# Patient Record
Sex: MALE | Race: BLACK OR AFRICAN AMERICAN | Employment: UNEMPLOYED | URBAN - METROPOLITAN AREA
[De-identification: names, ages, dates, MRNs, and addresses within clinical notes are randomized per-mention and may not be internally consistent; named-entity substitution may affect disease eponyms.]

---

## 2018-01-01 ENCOUNTER — APPOINTMENT (OUTPATIENT)
Dept: GENERAL RADIOLOGY | Age: 58
DRG: 720 | End: 2018-01-01
Attending: INTERNAL MEDICINE
Payer: MEDICAID

## 2018-01-01 ENCOUNTER — APPOINTMENT (OUTPATIENT)
Dept: CT IMAGING | Age: 58
DRG: 720 | End: 2018-01-01
Attending: EMERGENCY MEDICINE
Payer: MEDICAID

## 2018-01-01 ENCOUNTER — APPOINTMENT (OUTPATIENT)
Dept: ULTRASOUND IMAGING | Age: 58
DRG: 720 | End: 2018-01-01
Attending: NURSE PRACTITIONER
Payer: MEDICAID

## 2018-01-01 ENCOUNTER — APPOINTMENT (OUTPATIENT)
Dept: CT IMAGING | Age: 58
DRG: 720 | End: 2018-01-01
Attending: PSYCHIATRY & NEUROLOGY
Payer: MEDICAID

## 2018-01-01 ENCOUNTER — APPOINTMENT (OUTPATIENT)
Dept: GENERAL RADIOLOGY | Age: 58
DRG: 720 | End: 2018-01-01
Attending: EMERGENCY MEDICINE
Payer: MEDICAID

## 2018-01-01 ENCOUNTER — APPOINTMENT (OUTPATIENT)
Dept: GENERAL RADIOLOGY | Age: 58
DRG: 720 | End: 2018-01-01
Attending: ANESTHESIOLOGY
Payer: MEDICAID

## 2018-01-01 ENCOUNTER — APPOINTMENT (OUTPATIENT)
Dept: GENERAL RADIOLOGY | Age: 58
DRG: 720 | End: 2018-01-01
Attending: THORACIC SURGERY (CARDIOTHORACIC VASCULAR SURGERY)
Payer: MEDICAID

## 2018-01-01 ENCOUNTER — HOSPICE ADMISSION (OUTPATIENT)
Dept: HOSPICE | Facility: HOSPICE | Age: 58
End: 2018-01-01

## 2018-01-01 ENCOUNTER — APPOINTMENT (OUTPATIENT)
Dept: GENERAL RADIOLOGY | Age: 58
DRG: 720 | End: 2018-01-01
Attending: NURSE PRACTITIONER
Payer: MEDICAID

## 2018-01-01 ENCOUNTER — APPOINTMENT (OUTPATIENT)
Dept: ULTRASOUND IMAGING | Age: 58
DRG: 720 | End: 2018-01-01
Attending: INTERNAL MEDICINE
Payer: MEDICAID

## 2018-01-01 ENCOUNTER — APPOINTMENT (OUTPATIENT)
Dept: MRI IMAGING | Age: 58
DRG: 720 | End: 2018-01-01
Attending: NURSE PRACTITIONER
Payer: MEDICAID

## 2018-01-01 ENCOUNTER — HOSPITAL ENCOUNTER (INPATIENT)
Age: 58
LOS: 9 days | DRG: 720 | End: 2018-05-18
Attending: EMERGENCY MEDICINE | Admitting: FAMILY MEDICINE
Payer: MEDICAID

## 2018-01-01 VITALS
HEIGHT: 72 IN | BODY MASS INDEX: 22.34 KG/M2 | HEART RATE: 90 BPM | RESPIRATION RATE: 22 BRPM | WEIGHT: 164.9 LBS | OXYGEN SATURATION: 84 % | SYSTOLIC BLOOD PRESSURE: 107 MMHG | TEMPERATURE: 101.5 F | DIASTOLIC BLOOD PRESSURE: 60 MMHG

## 2018-01-01 DIAGNOSIS — R50.9 FEVER: ICD-10-CM

## 2018-01-01 DIAGNOSIS — R93.0 ABNORMAL HEAD CT: ICD-10-CM

## 2018-01-01 DIAGNOSIS — R06.82 RESPIRATORY RATE INCREASED: ICD-10-CM

## 2018-01-01 DIAGNOSIS — Z51.5 END OF LIFE CARE: ICD-10-CM

## 2018-01-01 DIAGNOSIS — J18.9 PNEUMONIA OF BOTH LUNGS DUE TO INFECTIOUS ORGANISM, UNSPECIFIED PART OF LUNG: ICD-10-CM

## 2018-01-01 DIAGNOSIS — G93.40 ENCEPHALOPATHY: ICD-10-CM

## 2018-01-01 DIAGNOSIS — R53.81 DEBILITY: ICD-10-CM

## 2018-01-01 DIAGNOSIS — R41.82 ALTERED MENTAL STATUS: ICD-10-CM

## 2018-01-01 DIAGNOSIS — F19.10 POLYSUBSTANCE ABUSE (HCC): ICD-10-CM

## 2018-01-01 DIAGNOSIS — N17.9 AKI (ACUTE KIDNEY INJURY) (HCC): ICD-10-CM

## 2018-01-01 DIAGNOSIS — R41.82 ALTERED MENTAL STATUS, UNSPECIFIED ALTERED MENTAL STATUS TYPE: ICD-10-CM

## 2018-01-01 DIAGNOSIS — Z71.89 GOALS OF CARE, COUNSELING/DISCUSSION: ICD-10-CM

## 2018-01-01 DIAGNOSIS — I38 ENDOCARDITIS, UNSPECIFIED CHRONICITY, UNSPECIFIED ENDOCARDITIS TYPE: ICD-10-CM

## 2018-01-01 DIAGNOSIS — R65.20 SEVERE SEPSIS (HCC): Primary | ICD-10-CM

## 2018-01-01 DIAGNOSIS — A41.9 SEVERE SEPSIS (HCC): Primary | ICD-10-CM

## 2018-01-01 LAB
ALBUMIN SERPL-MCNC: 1.4 G/DL (ref 3.5–5)
ALBUMIN SERPL-MCNC: 1.4 G/DL (ref 3.5–5)
ALBUMIN SERPL-MCNC: 1.5 G/DL (ref 3.5–5)
ALBUMIN SERPL-MCNC: 2.2 G/DL (ref 3.5–5)
ALBUMIN/GLOB SERPL: 0.3 {RATIO} (ref 1.1–2.2)
ALBUMIN/GLOB SERPL: 0.4 {RATIO} (ref 1.1–2.2)
ALP SERPL-CCNC: 111 U/L (ref 45–117)
ALP SERPL-CCNC: 59 U/L (ref 45–117)
ALP SERPL-CCNC: 61 U/L (ref 45–117)
ALP SERPL-CCNC: 65 U/L (ref 45–117)
ALP SERPL-CCNC: 68 U/L (ref 45–117)
ALP SERPL-CCNC: 73 U/L (ref 45–117)
ALP SERPL-CCNC: 79 U/L (ref 45–117)
ALT SERPL-CCNC: 41 U/L (ref 12–78)
ALT SERPL-CCNC: 53 U/L (ref 12–78)
ALT SERPL-CCNC: 53 U/L (ref 12–78)
ALT SERPL-CCNC: 59 U/L (ref 12–78)
ALT SERPL-CCNC: 62 U/L (ref 12–78)
ALT SERPL-CCNC: 80 U/L (ref 12–78)
ALT SERPL-CCNC: 87 U/L (ref 12–78)
AMMONIA PLAS-SCNC: 11 UMOL/L
AMPHET UR QL SCN: NEGATIVE
ANION GAP BLD CALC-SCNC: 19 MMOL/L (ref 10–20)
ANION GAP SERPL CALC-SCNC: 11 MMOL/L (ref 5–15)
ANION GAP SERPL CALC-SCNC: 19 MMOL/L (ref 5–15)
ANION GAP SERPL CALC-SCNC: 7 MMOL/L (ref 5–15)
ANION GAP SERPL CALC-SCNC: 7 MMOL/L (ref 5–15)
ANION GAP SERPL CALC-SCNC: 9 MMOL/L (ref 5–15)
ANNOTATION COMMENT IMP: NORMAL
APAP SERPL-MCNC: <2 UG/ML (ref 10–30)
APPEARANCE UR: ABNORMAL
APTT PPP: 30.7 SEC (ref 22.1–32)
APTT PPP: 31.6 SEC (ref 22.1–32)
APTT PPP: 33.3 SEC (ref 22.1–32)
ARTERIAL PATENCY WRIST A: NO
ARTERIAL PATENCY WRIST A: YES
AST SERPL-CCNC: 114 U/L (ref 15–37)
AST SERPL-CCNC: 137 U/L (ref 15–37)
AST SERPL-CCNC: 143 U/L (ref 15–37)
AST SERPL-CCNC: 149 U/L (ref 15–37)
AST SERPL-CCNC: 162 U/L (ref 15–37)
AST SERPL-CCNC: 176 U/L (ref 15–37)
AST SERPL-CCNC: 260 U/L (ref 15–37)
ATRIAL RATE: 108 BPM
ATRIAL RATE: 110 BPM
ATRIAL RATE: 113 BPM
ATRIAL RATE: 220 BPM
ATRIAL RATE: 66 BPM
ATRIAL RATE: 68 BPM
ATRIAL RATE: 71 BPM
ATRIAL RATE: 72 BPM
ATRIAL RATE: 73 BPM
ATRIAL RATE: 98 BPM
BACTERIA SPEC CULT: ABNORMAL
BACTERIA URNS QL MICRO: NEGATIVE /HPF
BARBITURATES UR QL SCN: NEGATIVE
BASE DEFICIT BLD-SCNC: 3 MMOL/L
BASE DEFICIT BLD-SCNC: 4 MMOL/L
BASE DEFICIT BLD-SCNC: 6 MMOL/L
BASOPHILS # BLD: 0 K/UL (ref 0–0.1)
BASOPHILS NFR BLD: 0 % (ref 0–1)
BDY SITE: ABNORMAL
BENZODIAZ UR QL: NEGATIVE
BILIRUB SERPL-MCNC: 2.8 MG/DL (ref 0.2–1)
BILIRUB SERPL-MCNC: 3.4 MG/DL (ref 0.2–1)
BILIRUB SERPL-MCNC: 3.7 MG/DL (ref 0.2–1)
BILIRUB SERPL-MCNC: 4 MG/DL (ref 0.2–1)
BILIRUB SERPL-MCNC: 4 MG/DL (ref 0.2–1)
BILIRUB SERPL-MCNC: 4.1 MG/DL (ref 0.2–1)
BILIRUB SERPL-MCNC: 5.6 MG/DL (ref 0.2–1)
BILIRUB UR QL CFM: POSITIVE
BNP SERPL-MCNC: ABNORMAL PG/ML (ref 0–125)
BUN BLD-MCNC: 46 MG/DL (ref 9–20)
BUN SERPL-MCNC: 56 MG/DL (ref 6–20)
BUN SERPL-MCNC: 56 MG/DL (ref 6–20)
BUN SERPL-MCNC: 58 MG/DL (ref 6–20)
BUN SERPL-MCNC: 60 MG/DL (ref 6–20)
BUN SERPL-MCNC: 63 MG/DL (ref 6–20)
BUN SERPL-MCNC: 65 MG/DL (ref 6–20)
BUN SERPL-MCNC: 70 MG/DL (ref 6–20)
BUN SERPL-MCNC: 76 MG/DL (ref 6–20)
BUN/CREAT SERPL: 20 (ref 12–20)
BUN/CREAT SERPL: 34 (ref 12–20)
BUN/CREAT SERPL: 34 (ref 12–20)
BUN/CREAT SERPL: 36 (ref 12–20)
BUN/CREAT SERPL: 37 (ref 12–20)
BUN/CREAT SERPL: 41 (ref 12–20)
BUN/CREAT SERPL: 42 (ref 12–20)
BUN/CREAT SERPL: 43 (ref 12–20)
C-ANCA TITR SER IF: ABNORMAL TITER
CA-I BLD-MCNC: 0.88 MMOL/L (ref 1.12–1.32)
CALCIUM SERPL-MCNC: 6.9 MG/DL (ref 8.5–10.1)
CALCIUM SERPL-MCNC: 7 MG/DL (ref 8.5–10.1)
CALCIUM SERPL-MCNC: 7.1 MG/DL (ref 8.5–10.1)
CALCIUM SERPL-MCNC: 7.4 MG/DL (ref 8.5–10.1)
CALCIUM SERPL-MCNC: 7.7 MG/DL (ref 8.5–10.1)
CALCIUM SERPL-MCNC: 8.4 MG/DL (ref 8.5–10.1)
CALCULATED P AXIS, ECG09: -7 DEGREES
CALCULATED P AXIS, ECG09: 58 DEGREES
CALCULATED P AXIS, ECG09: 58 DEGREES
CALCULATED P AXIS, ECG09: 62 DEGREES
CALCULATED P AXIS, ECG09: 65 DEGREES
CALCULATED R AXIS, ECG10: 20 DEGREES
CALCULATED R AXIS, ECG10: 42 DEGREES
CALCULATED R AXIS, ECG10: 43 DEGREES
CALCULATED R AXIS, ECG10: 45 DEGREES
CALCULATED R AXIS, ECG10: 56 DEGREES
CALCULATED R AXIS, ECG10: 68 DEGREES
CALCULATED R AXIS, ECG10: 69 DEGREES
CALCULATED R AXIS, ECG10: 74 DEGREES
CALCULATED R AXIS, ECG10: 75 DEGREES
CALCULATED R AXIS, ECG10: 76 DEGREES
CALCULATED T AXIS, ECG11: -3 DEGREES
CALCULATED T AXIS, ECG11: -4 DEGREES
CALCULATED T AXIS, ECG11: 1 DEGREES
CALCULATED T AXIS, ECG11: 10 DEGREES
CALCULATED T AXIS, ECG11: 15 DEGREES
CALCULATED T AXIS, ECG11: 2 DEGREES
CALCULATED T AXIS, ECG11: 24 DEGREES
CALCULATED T AXIS, ECG11: 31 DEGREES
CALCULATED T AXIS, ECG11: 55 DEGREES
CALCULATED T AXIS, ECG11: 9 DEGREES
CANNABINOIDS UR QL SCN: NEGATIVE
CHLORIDE BLD-SCNC: 101 MMOL/L (ref 98–107)
CHLORIDE SERPL-SCNC: 117 MMOL/L (ref 97–108)
CHLORIDE SERPL-SCNC: 118 MMOL/L (ref 97–108)
CHLORIDE SERPL-SCNC: 120 MMOL/L (ref 97–108)
CHLORIDE SERPL-SCNC: 120 MMOL/L (ref 97–108)
CHLORIDE SERPL-SCNC: 121 MMOL/L (ref 97–108)
CHLORIDE SERPL-SCNC: 94 MMOL/L (ref 97–108)
CHOLEST SERPL-MCNC: <50 MG/DL
CK MB CFR SERPL CALC: 10.4 % (ref 0–2.5)
CK MB SERPL-MCNC: 31.6 NG/ML (ref 5–25)
CK SERPL-CCNC: 116 U/L (ref 39–308)
CK SERPL-CCNC: 303 U/L (ref 39–308)
CO2 BLD-SCNC: 20 MMOL/L (ref 21–32)
CO2 SERPL-SCNC: 19 MMOL/L (ref 21–32)
CO2 SERPL-SCNC: 21 MMOL/L (ref 21–32)
CO2 SERPL-SCNC: 21 MMOL/L (ref 21–32)
CO2 SERPL-SCNC: 23 MMOL/L (ref 21–32)
CO2 SERPL-SCNC: 24 MMOL/L (ref 21–32)
CO2 SERPL-SCNC: 26 MMOL/L (ref 21–32)
COCAINE UR QL SCN: POSITIVE
COLOR UR: ABNORMAL
CREAT BLD-MCNC: 2.6 MG/DL (ref 0.6–1.3)
CREAT SERPL-MCNC: 1.57 MG/DL (ref 0.7–1.3)
CREAT SERPL-MCNC: 1.63 MG/DL (ref 0.7–1.3)
CREAT SERPL-MCNC: 1.65 MG/DL (ref 0.7–1.3)
CREAT SERPL-MCNC: 1.65 MG/DL (ref 0.7–1.3)
CREAT SERPL-MCNC: 1.7 MG/DL (ref 0.7–1.3)
CREAT SERPL-MCNC: 1.73 MG/DL (ref 0.7–1.3)
CREAT SERPL-MCNC: 1.77 MG/DL (ref 0.7–1.3)
CREAT SERPL-MCNC: 2.78 MG/DL (ref 0.7–1.3)
CRP SERPL-MCNC: 10.5 MG/DL (ref 0–0.6)
D DIMER PPP FEU-MCNC: 14.67 MG/L FEU (ref 0–0.65)
DATE LAST DOSE: ABNORMAL
DATE LAST DOSE: ABNORMAL
DIAGNOSIS, 93000: NORMAL
DIFFERENTIAL METHOD BLD: ABNORMAL
DRUG SCRN COMMENT,DRGCM: ABNORMAL
EOSINOPHIL # BLD: 0 K/UL (ref 0–0.4)
EOSINOPHIL # BLD: 0.6 K/UL (ref 0–0.4)
EOSINOPHIL NFR BLD: 0 % (ref 0–7)
EOSINOPHIL NFR BLD: 2 % (ref 0–7)
EPITH CASTS URNS QL MICRO: ABNORMAL /LPF
ERYTHROCYTE [DISTWIDTH] IN BLOOD BY AUTOMATED COUNT: 15.4 % (ref 11.5–14.5)
ERYTHROCYTE [DISTWIDTH] IN BLOOD BY AUTOMATED COUNT: 15.9 % (ref 11.5–14.5)
ERYTHROCYTE [DISTWIDTH] IN BLOOD BY AUTOMATED COUNT: 15.9 % (ref 11.5–14.5)
ERYTHROCYTE [DISTWIDTH] IN BLOOD BY AUTOMATED COUNT: 16.3 % (ref 11.5–14.5)
ERYTHROCYTE [DISTWIDTH] IN BLOOD BY AUTOMATED COUNT: 16.3 % (ref 11.5–14.5)
ERYTHROCYTE [DISTWIDTH] IN BLOOD BY AUTOMATED COUNT: 16.6 % (ref 11.5–14.5)
ERYTHROCYTE [DISTWIDTH] IN BLOOD BY AUTOMATED COUNT: 17 % (ref 11.5–14.5)
ERYTHROCYTE [SEDIMENTATION RATE] IN BLOOD: 3 MM/HR (ref 0–20)
ETHANOL SERPL-MCNC: <10 MG/DL
FIBRINOGEN PPP-MCNC: 232 MG/DL (ref 200–475)
FLUAV AG NPH QL IA: NEGATIVE
FLUBV AG NOSE QL IA: NEGATIVE
FSP PPP LA-ACNC: 40 UG/ML
GAS FLOW.O2 O2 DELIVERY SYS: ABNORMAL L/MIN
GAS FLOW.O2 SETTING OXYMISER: 14 BPM
GAS FLOW.O2 SETTING OXYMISER: 2 L/M
GAS FLOW.O2 SETTING OXYMISER: 2 L/M
GAS FLOW.O2 SETTING OXYMISER: 20 BPM
GLOBULIN SER CALC-MCNC: 4.5 G/DL (ref 2–4)
GLOBULIN SER CALC-MCNC: 4.5 G/DL (ref 2–4)
GLOBULIN SER CALC-MCNC: 4.7 G/DL (ref 2–4)
GLOBULIN SER CALC-MCNC: 4.7 G/DL (ref 2–4)
GLOBULIN SER CALC-MCNC: 4.9 G/DL (ref 2–4)
GLOBULIN SER CALC-MCNC: 4.9 G/DL (ref 2–4)
GLOBULIN SER CALC-MCNC: 6.2 G/DL (ref 2–4)
GLUCOSE BLD STRIP.AUTO-MCNC: 112 MG/DL (ref 65–100)
GLUCOSE BLD STRIP.AUTO-MCNC: 157 MG/DL (ref 65–100)
GLUCOSE BLD STRIP.AUTO-MCNC: 166 MG/DL (ref 65–100)
GLUCOSE BLD STRIP.AUTO-MCNC: 96 MG/DL (ref 65–100)
GLUCOSE BLD-MCNC: 110 MG/DL (ref 65–100)
GLUCOSE SERPL-MCNC: 104 MG/DL (ref 65–100)
GLUCOSE SERPL-MCNC: 144 MG/DL (ref 65–100)
GLUCOSE SERPL-MCNC: 148 MG/DL (ref 65–100)
GLUCOSE SERPL-MCNC: 161 MG/DL (ref 65–100)
GLUCOSE SERPL-MCNC: 164 MG/DL (ref 65–100)
GLUCOSE SERPL-MCNC: 177 MG/DL (ref 65–100)
GLUCOSE SERPL-MCNC: 180 MG/DL (ref 65–100)
GLUCOSE SERPL-MCNC: 184 MG/DL (ref 65–100)
GLUCOSE UR STRIP.AUTO-MCNC: NEGATIVE MG/DL
GRAM STN SPEC: ABNORMAL
GRAM STN SPEC: ABNORMAL
HBV CORE AB SERPL QL IA: NEGATIVE
HBV CORE IGM SER QL: NONREACTIVE
HBV SURFACE AG SER QL: <0.1 INDEX
HBV SURFACE AG SER QL: NEGATIVE
HCO3 BLD-SCNC: 20.1 MMOL/L (ref 22–26)
HCO3 BLD-SCNC: 20.1 MMOL/L (ref 22–26)
HCO3 BLD-SCNC: 20.4 MMOL/L (ref 22–26)
HCO3 BLD-SCNC: 20.8 MMOL/L (ref 22–26)
HCO3 BLD-SCNC: 21.7 MMOL/L (ref 22–26)
HCT VFR BLD AUTO: 36 % (ref 36.6–50.3)
HCT VFR BLD AUTO: 36 % (ref 36.6–50.3)
HCT VFR BLD AUTO: 36.3 % (ref 36.6–50.3)
HCT VFR BLD AUTO: 36.9 % (ref 36.6–50.3)
HCT VFR BLD AUTO: 37 % (ref 36.6–50.3)
HCT VFR BLD AUTO: 37.5 % (ref 36.6–50.3)
HCT VFR BLD AUTO: 39.7 % (ref 36.6–50.3)
HCT VFR BLD AUTO: 46.1 % (ref 36.6–50.3)
HCT VFR BLD AUTO: 46.1 % (ref 36.6–50.3)
HCT VFR BLD CALC: 35 % (ref 36.6–50.3)
HDLC SERPL-MCNC: 9 MG/DL
HDLC SERPL: ABNORMAL {RATIO} (ref 0–5)
HEMOCCULT STL QL: NEGATIVE
HGB BLD-MCNC: 11.5 G/DL (ref 12.1–17)
HGB BLD-MCNC: 11.6 G/DL (ref 12.1–17)
HGB BLD-MCNC: 11.8 G/DL (ref 12.1–17)
HGB BLD-MCNC: 11.9 G/DL (ref 12.1–17)
HGB BLD-MCNC: 12.1 G/DL (ref 12.1–17)
HGB BLD-MCNC: 12.4 G/DL (ref 12.1–17)
HGB BLD-MCNC: 12.8 G/DL (ref 12.1–17)
HGB BLD-MCNC: 15.4 G/DL (ref 12.1–17)
HGB BLD-MCNC: 15.4 G/DL (ref 12.1–17)
HGB UR QL STRIP: ABNORMAL
HIV 1+2 AB+HIV1 P24 AG SERPL QL IA: NONREACTIVE
HIV12 RESULT COMMENT, HHIVC: NORMAL
IMM GRANULOCYTES # BLD: 0 K/UL
IMM GRANULOCYTES NFR BLD AUTO: 0 %
INR PPP: 1.3 (ref 0.9–1.1)
INR PPP: 1.4 (ref 0.9–1.1)
INR PPP: 1.5 (ref 0.9–1.1)
INR PPP: 1.5 (ref 0.9–1.1)
INR PPP: 1.8 (ref 0.9–1.1)
INR PPP: 1.9 (ref 0.9–1.1)
KETONES UR QL STRIP.AUTO: NEGATIVE MG/DL
LACTATE BLD-SCNC: 6.8 MMOL/L (ref 0.4–2)
LACTATE BLD-SCNC: 6.8 MMOL/L (ref 0.4–2)
LACTATE SERPL-SCNC: 1.6 MMOL/L (ref 0.4–2)
LACTATE SERPL-SCNC: 2.1 MMOL/L (ref 0.4–2)
LACTATE SERPL-SCNC: 2.7 MMOL/L (ref 0.4–2)
LACTATE SERPL-SCNC: 5.3 MMOL/L (ref 0.4–2)
LDLC SERPL CALC-MCNC: ABNORMAL MG/DL (ref 0–100)
LEUKOCYTE ESTERASE UR QL STRIP.AUTO: ABNORMAL
LIPASE SERPL-CCNC: 138 U/L (ref 73–393)
LIPID PROFILE,FLP: ABNORMAL
LYMPHOCYTES # BLD: 0.6 K/UL (ref 0.8–3.5)
LYMPHOCYTES # BLD: 0.8 K/UL (ref 0.8–3.5)
LYMPHOCYTES # BLD: 0.9 K/UL (ref 0.8–3.5)
LYMPHOCYTES # BLD: 1.1 K/UL (ref 0.8–3.5)
LYMPHOCYTES # BLD: 1.1 K/UL (ref 0.8–3.5)
LYMPHOCYTES # BLD: 1.3 K/UL (ref 0.8–3.5)
LYMPHOCYTES # BLD: 1.4 K/UL (ref 0.8–3.5)
LYMPHOCYTES # BLD: 2.3 K/UL (ref 0.8–3.5)
LYMPHOCYTES NFR BLD: 2 % (ref 12–49)
LYMPHOCYTES NFR BLD: 3 % (ref 12–49)
LYMPHOCYTES NFR BLD: 4 % (ref 12–49)
LYMPHOCYTES NFR BLD: 5 % (ref 12–49)
LYMPHOCYTES NFR BLD: 9 % (ref 12–49)
M TB IFN-G CD4+ BCKGRND COR BLD-ACNC: 0.01 IU/ML
M TB IFN-G CD4+ T-CELLS BLD-ACNC: 0.05 IU/ML
M TB TUBERC IFN-G BLD QL: NORMAL
M TB TUBERC IGNF/MITOGEN IGNF CONTROL: 0.03 IU/ML
MAGNESIUM SERPL-MCNC: 2.1 MG/DL (ref 1.6–2.4)
MAGNESIUM SERPL-MCNC: 2.2 MG/DL (ref 1.6–2.4)
MAGNESIUM SERPL-MCNC: 2.7 MG/DL (ref 1.6–2.4)
MAGNESIUM SERPL-MCNC: 3.3 MG/DL (ref 1.6–2.4)
MAGNESIUM SERPL-MCNC: 3.4 MG/DL (ref 1.6–2.4)
MAGNESIUM SERPL-MCNC: 3.4 MG/DL (ref 1.6–2.4)
MAGNESIUM SERPL-MCNC: 3.6 MG/DL (ref 1.6–2.4)
MCH RBC QN AUTO: 27 PG (ref 26–34)
MCH RBC QN AUTO: 27.2 PG (ref 26–34)
MCH RBC QN AUTO: 27.3 PG (ref 26–34)
MCH RBC QN AUTO: 27.5 PG (ref 26–34)
MCHC RBC AUTO-ENTMCNC: 31.9 G/DL (ref 30–36.5)
MCHC RBC AUTO-ENTMCNC: 32 G/DL (ref 30–36.5)
MCHC RBC AUTO-ENTMCNC: 32.2 G/DL (ref 30–36.5)
MCHC RBC AUTO-ENTMCNC: 32.2 G/DL (ref 30–36.5)
MCHC RBC AUTO-ENTMCNC: 32.8 G/DL (ref 30–36.5)
MCHC RBC AUTO-ENTMCNC: 32.8 G/DL (ref 30–36.5)
MCHC RBC AUTO-ENTMCNC: 33.1 G/DL (ref 30–36.5)
MCHC RBC AUTO-ENTMCNC: 33.4 G/DL (ref 30–36.5)
MCHC RBC AUTO-ENTMCNC: 33.4 G/DL (ref 30–36.5)
MCV RBC AUTO: 81.6 FL (ref 80–99)
MCV RBC AUTO: 81.6 FL (ref 80–99)
MCV RBC AUTO: 82.4 FL (ref 80–99)
MCV RBC AUTO: 82.4 FL (ref 80–99)
MCV RBC AUTO: 83.3 FL (ref 80–99)
MCV RBC AUTO: 84.5 FL (ref 80–99)
MCV RBC AUTO: 84.9 FL (ref 80–99)
MCV RBC AUTO: 85.3 FL (ref 80–99)
MCV RBC AUTO: 86 FL (ref 80–99)
METAMYELOCYTES NFR BLD MANUAL: 1 %
METHADONE UR QL: NEGATIVE
MONOCYTES # BLD: 0.3 K/UL (ref 0–1)
MONOCYTES # BLD: 0.3 K/UL (ref 0–1)
MONOCYTES # BLD: 0.5 K/UL (ref 0–1)
MONOCYTES # BLD: 0.6 K/UL (ref 0–1)
MONOCYTES # BLD: 0.6 K/UL (ref 0–1)
MONOCYTES # BLD: 0.7 K/UL (ref 0–1)
MONOCYTES # BLD: 0.7 K/UL (ref 0–1)
MONOCYTES # BLD: 1.3 K/UL (ref 0–1)
MONOCYTES NFR BLD: 1 % (ref 5–13)
MONOCYTES NFR BLD: 1 % (ref 5–13)
MONOCYTES NFR BLD: 2 % (ref 5–13)
MONOCYTES NFR BLD: 4 % (ref 5–13)
MYELOCYTES NFR BLD MANUAL: 1 %
MYELOCYTES NFR BLD MANUAL: 2 %
MYELOCYTES NFR BLD MANUAL: 3 %
MYELOCYTES NFR BLD MANUAL: 3 %
MYELOPEROXIDASE AB SER IA-ACNC: <9 U/ML (ref 0–9)
NEUTS BAND NFR BLD MANUAL: 3 % (ref 0–6)
NEUTS BAND NFR BLD MANUAL: 5 % (ref 0–6)
NEUTS BAND NFR BLD MANUAL: 5 % (ref 0–6)
NEUTS SEG # BLD: 21.6 K/UL (ref 1.8–8)
NEUTS SEG # BLD: 23.9 K/UL (ref 1.8–8)
NEUTS SEG # BLD: 26.1 K/UL (ref 1.8–8)
NEUTS SEG # BLD: 26.5 K/UL (ref 1.8–8)
NEUTS SEG # BLD: 27 K/UL (ref 1.8–8)
NEUTS SEG # BLD: 28.8 K/UL (ref 1.8–8)
NEUTS SEG # BLD: 32.4 K/UL (ref 1.8–8)
NEUTS SEG # BLD: 32.4 K/UL (ref 1.8–8)
NEUTS SEG NFR BLD: 86 % (ref 32–75)
NEUTS SEG NFR BLD: 87 % (ref 32–75)
NEUTS SEG NFR BLD: 90 % (ref 32–75)
NEUTS SEG NFR BLD: 90 % (ref 32–75)
NEUTS SEG NFR BLD: 91 % (ref 32–75)
NEUTS SEG NFR BLD: 93 % (ref 32–75)
NITRITE UR QL STRIP.AUTO: NEGATIVE
NRBC # BLD: 0 K/UL (ref 0–0.01)
NRBC # BLD: 0 K/UL (ref 0–0.01)
NRBC # BLD: 0.02 K/UL (ref 0–0.01)
NRBC # BLD: 0.03 K/UL (ref 0–0.01)
NRBC # BLD: 0.05 K/UL (ref 0–0.01)
NRBC # BLD: 0.06 K/UL (ref 0–0.01)
NRBC # BLD: 0.06 K/UL (ref 0–0.01)
NRBC BLD-RTO: 0 PER 100 WBC
NRBC BLD-RTO: 0 PER 100 WBC
NRBC BLD-RTO: 0.1 PER 100 WBC
NRBC BLD-RTO: 0.2 PER 100 WBC
O+P SPEC MICRO: NORMAL
O+P STL CONC: NORMAL
O2/TOTAL GAS SETTING VFR VENT: 100 %
O2/TOTAL GAS SETTING VFR VENT: 60 %
OPIATES UR QL: POSITIVE
OSMOLALITY UR: 447 MOSM/KG H2O
OTHER,OTHU: ABNORMAL
P-ANCA ATYPICAL TITR SER IF: ABNORMAL TITER
P-ANCA TITR SER IF: ABNORMAL TITER
P-R INTERVAL, ECG05: 130 MS
P-R INTERVAL, ECG05: 130 MS
P-R INTERVAL, ECG05: 160 MS
P-R INTERVAL, ECG05: 166 MS
PATH REV BLD -IMP: ABNORMAL
PCO2 BLD: 24.2 MMHG (ref 35–45)
PCO2 BLD: 25.4 MMHG (ref 35–45)
PCO2 BLD: 28.4 MMHG (ref 35–45)
PCO2 BLD: 29.3 MMHG (ref 35–45)
PCO2 BLD: 56.2 MMHG (ref 35–45)
PCP UR QL: NEGATIVE
PEEP RESPIRATORY: 5 CMH2O
PEEP RESPIRATORY: 8 CMH2O
PH BLD: 7.2 [PH] (ref 7.35–7.45)
PH BLD: 7.46 [PH] (ref 7.35–7.45)
PH BLD: 7.46 [PH] (ref 7.35–7.45)
PH BLD: 7.51 [PH] (ref 7.35–7.45)
PH BLD: 7.53 [PH] (ref 7.35–7.45)
PH UR STRIP: 5.5 [PH] (ref 5–8)
PHOSPHATE SERPL-MCNC: 2.8 MG/DL (ref 2.6–4.7)
PHOSPHATE SERPL-MCNC: 3.1 MG/DL (ref 2.6–4.7)
PHOSPHATE SERPL-MCNC: 3.7 MG/DL (ref 2.6–4.7)
PHOSPHATE SERPL-MCNC: 3.9 MG/DL (ref 2.6–4.7)
PHOSPHATE SERPL-MCNC: 3.9 MG/DL (ref 2.6–4.7)
PHOSPHATE SERPL-MCNC: 4.6 MG/DL (ref 2.6–4.7)
PLATELET # BLD AUTO: 26 K/UL (ref 150–400)
PLATELET # BLD AUTO: 28 K/UL (ref 150–400)
PLATELET # BLD AUTO: 31 K/UL (ref 150–400)
PLATELET # BLD AUTO: 32 K/UL (ref 150–400)
PLATELET # BLD AUTO: 32 K/UL (ref 150–400)
PLATELET # BLD AUTO: 43 K/UL (ref 150–400)
PLATELET # BLD AUTO: 54 K/UL (ref 150–400)
PLATELET # BLD AUTO: 84 K/UL (ref 150–400)
PLATELET # BLD AUTO: 84 K/UL (ref 150–400)
PLATELET COMMENTS,PCOM: ABNORMAL
PMV BLD AUTO: 12 FL (ref 8.9–12.9)
PMV BLD AUTO: 12 FL (ref 8.9–12.9)
PMV BLD AUTO: 12.2 FL (ref 8.9–12.9)
PMV BLD AUTO: 12.9 FL (ref 8.9–12.9)
PMV BLD AUTO: ABNORMAL FL (ref 8.9–12.9)
PO2 BLD: 100 MMHG (ref 80–100)
PO2 BLD: 248 MMHG (ref 80–100)
PO2 BLD: 58 MMHG (ref 80–100)
PO2 BLD: 71 MMHG (ref 80–100)
PO2 BLD: 80 MMHG (ref 80–100)
POTASSIUM BLD-SCNC: 3.5 MMOL/L (ref 3.5–5.1)
POTASSIUM SERPL-SCNC: 3.6 MMOL/L (ref 3.5–5.1)
POTASSIUM SERPL-SCNC: 4 MMOL/L (ref 3.5–5.1)
POTASSIUM SERPL-SCNC: 4 MMOL/L (ref 3.5–5.1)
POTASSIUM SERPL-SCNC: 4.1 MMOL/L (ref 3.5–5.1)
POTASSIUM SERPL-SCNC: 4.2 MMOL/L (ref 3.5–5.1)
POTASSIUM SERPL-SCNC: 4.3 MMOL/L (ref 3.5–5.1)
POTASSIUM SERPL-SCNC: 4.4 MMOL/L (ref 3.5–5.1)
POTASSIUM SERPL-SCNC: 4.4 MMOL/L (ref 3.5–5.1)
PROT SERPL-MCNC: 5.9 G/DL (ref 6.4–8.2)
PROT SERPL-MCNC: 6 G/DL (ref 6.4–8.2)
PROT SERPL-MCNC: 6.2 G/DL (ref 6.4–8.2)
PROT SERPL-MCNC: 6.2 G/DL (ref 6.4–8.2)
PROT SERPL-MCNC: 6.3 G/DL (ref 6.4–8.2)
PROT SERPL-MCNC: 6.4 G/DL (ref 6.4–8.2)
PROT SERPL-MCNC: 8.4 G/DL (ref 6.4–8.2)
PROT UR STRIP-MCNC: 30 MG/DL
PROTEINASE3 AB SER IA-ACNC: <3.5 U/ML (ref 0–3.5)
PROTHROMBIN TIME: 13.5 SEC (ref 9–11.1)
PROTHROMBIN TIME: 14.5 SEC (ref 9–11.1)
PROTHROMBIN TIME: 15.4 SEC (ref 9–11.1)
PROTHROMBIN TIME: 15.7 SEC (ref 9–11.1)
PROTHROMBIN TIME: 17.7 SEC (ref 9–11.1)
PROTHROMBIN TIME: 17.9 SEC (ref 9–11.1)
PROTHROMBIN TIME: 18.3 SEC (ref 9–11.1)
PROTHROMBIN TIME: 18.8 SEC (ref 9–11.1)
Q-T INTERVAL, ECG07: 314 MS
Q-T INTERVAL, ECG07: 336 MS
Q-T INTERVAL, ECG07: 360 MS
Q-T INTERVAL, ECG07: 364 MS
Q-T INTERVAL, ECG07: 370 MS
Q-T INTERVAL, ECG07: 418 MS
Q-T INTERVAL, ECG07: 446 MS
Q-T INTERVAL, ECG07: 450 MS
Q-T INTERVAL, ECG07: 454 MS
Q-T INTERVAL, ECG07: 502 MS
QRS DURATION, ECG06: 100 MS
QRS DURATION, ECG06: 102 MS
QRS DURATION, ECG06: 110 MS
QRS DURATION, ECG06: 114 MS
QRS DURATION, ECG06: 114 MS
QRS DURATION, ECG06: 116 MS
QRS DURATION, ECG06: 120 MS
QRS DURATION, ECG06: 182 MS
QRS DURATION, ECG06: 88 MS
QRS DURATION, ECG06: 98 MS
QTC CALCULATION (BEZET), ECG08: 424 MS
QTC CALCULATION (BEZET), ECG08: 426 MS
QTC CALCULATION (BEZET), ECG08: 457 MS
QTC CALCULATION (BEZET), ECG08: 482 MS
QTC CALCULATION (BEZET), ECG08: 484 MS
QTC CALCULATION (BEZET), ECG08: 495 MS
QTC CALCULATION (BEZET), ECG08: 495 MS
QTC CALCULATION (BEZET), ECG08: 496 MS
QTC CALCULATION (BEZET), ECG08: 509 MS
QTC CALCULATION (BEZET), ECG08: 553 MS
QUANTIFERON NIL VALUE: 0.04 IU/ML
RBC # BLD AUTO: 4.22 M/UL (ref 4.1–5.7)
RBC # BLD AUTO: 4.22 M/UL (ref 4.1–5.7)
RBC # BLD AUTO: 4.36 M/UL (ref 4.1–5.7)
RBC # BLD AUTO: 4.37 M/UL (ref 4.1–5.7)
RBC # BLD AUTO: 4.43 M/UL (ref 4.1–5.7)
RBC # BLD AUTO: 4.55 M/UL (ref 4.1–5.7)
RBC # BLD AUTO: 4.7 M/UL (ref 4.1–5.7)
RBC # BLD AUTO: 5.65 M/UL (ref 4.1–5.7)
RBC # BLD AUTO: 5.65 M/UL (ref 4.1–5.7)
RBC #/AREA URNS HPF: ABNORMAL /HPF (ref 0–5)
RBC MORPH BLD: ABNORMAL
REPORTED DOSE,DOSE: ABNORMAL UNITS
REPORTED DOSE,DOSE: ABNORMAL UNITS
REPORTED DOSE/TIME,TMG: ABNORMAL
REPORTED DOSE/TIME,TMG: ABNORMAL
SALICYLATES SERPL-MCNC: <1.7 MG/DL (ref 2.8–20)
SAO2 % BLD: 100 % (ref 92–97)
SAO2 % BLD: 92 % (ref 92–97)
SAO2 % BLD: 96 % (ref 92–97)
SAO2 % BLD: 96 % (ref 92–97)
SAO2 % BLD: 97 % (ref 92–97)
SERVICE CMNT-IMP: ABNORMAL
SERVICE CMNT-IMP: NORMAL
SERVICE CMNT-IMP: NORMAL
SODIUM BLD-SCNC: 136 MMOL/L (ref 136–145)
SODIUM SERPL-SCNC: 134 MMOL/L (ref 136–145)
SODIUM SERPL-SCNC: 147 MMOL/L (ref 136–145)
SODIUM SERPL-SCNC: 148 MMOL/L (ref 136–145)
SODIUM SERPL-SCNC: 149 MMOL/L (ref 136–145)
SODIUM SERPL-SCNC: 150 MMOL/L (ref 136–145)
SODIUM SERPL-SCNC: 150 MMOL/L (ref 136–145)
SODIUM SERPL-SCNC: 151 MMOL/L (ref 136–145)
SODIUM SERPL-SCNC: 155 MMOL/L (ref 136–145)
SODIUM UR-SCNC: 12 MMOL/L
SP GR UR REFRACTOMETRY: 1.02 (ref 1–1.03)
SPECIMEN SOURCE: NORMAL
SPECIMEN TYPE: ABNORMAL
THERAPEUTIC RANGE,PTTT: ABNORMAL SECS (ref 58–77)
THERAPEUTIC RANGE,PTTT: NORMAL SECS (ref 58–77)
THERAPEUTIC RANGE,PTTT: NORMAL SECS (ref 58–77)
TOTAL RESP. RATE, ITRR: 14
TOTAL RESP. RATE, ITRR: 26
TOTAL RESP. RATE, ITRR: 40
TRIGL SERPL-MCNC: 173 MG/DL (ref ?–150)
TROPONIN I SERPL-MCNC: 19.3 NG/ML
TSH SERPL DL<=0.05 MIU/L-ACNC: 0.16 UIU/ML (ref 0.36–3.74)
TSH SERPL DL<=0.05 MIU/L-ACNC: 0.61 UIU/ML (ref 0.36–3.74)
UROBILINOGEN UR QL STRIP.AUTO: 1 EU/DL (ref 0.2–1)
VANCOMYCIN SERPL-MCNC: 7.9 UG/ML
VANCOMYCIN TROUGH SERPL-MCNC: 14.7 UG/ML (ref 5–10)
VANCOMYCIN TROUGH SERPL-MCNC: 20.7 UG/ML (ref 5–10)
VENTILATION MODE VENT: ABNORMAL
VENTILATION MODE VENT: ABNORMAL
VENTRICULAR RATE, ECG03: 110 BPM
VENTRICULAR RATE, ECG03: 111 BPM
VENTRICULAR RATE, ECG03: 114 BPM
VENTRICULAR RATE, ECG03: 114 BPM
VENTRICULAR RATE, ECG03: 68 BPM
VENTRICULAR RATE, ECG03: 71 BPM
VENTRICULAR RATE, ECG03: 72 BPM
VENTRICULAR RATE, ECG03: 73 BPM
VENTRICULAR RATE, ECG03: 73 BPM
VENTRICULAR RATE, ECG03: 97 BPM
VLDLC SERPL CALC-MCNC: ABNORMAL MG/DL
VOLUME CONTROL IVLC: YES
VT SETTING VENT: 500 ML
VT SETTING VENT: 500 ML
WBC # BLD AUTO: 25.1 K/UL (ref 4.1–11.1)
WBC # BLD AUTO: 25.7 K/UL (ref 4.1–11.1)
WBC # BLD AUTO: 27.5 K/UL (ref 4.1–11.1)
WBC # BLD AUTO: 28.1 K/UL (ref 4.1–11.1)
WBC # BLD AUTO: 28.1 K/UL (ref 4.1–11.1)
WBC # BLD AUTO: 29.1 K/UL (ref 4.1–11.1)
WBC # BLD AUTO: 32 K/UL (ref 4.1–11.1)
WBC # BLD AUTO: 35.2 K/UL (ref 4.1–11.1)
WBC # BLD AUTO: 35.2 K/UL (ref 4.1–11.1)
WBC MORPH BLD: ABNORMAL
WBC URNS QL MICRO: ABNORMAL /HPF (ref 0–4)

## 2018-01-01 PROCEDURE — 74011250637 HC RX REV CODE- 250/637: Performed by: INTERNAL MEDICINE

## 2018-01-01 PROCEDURE — C9113 INJ PANTOPRAZOLE SODIUM, VIA: HCPCS | Performed by: INTERNAL MEDICINE

## 2018-01-01 PROCEDURE — 84484 ASSAY OF TROPONIN QUANT: CPT | Performed by: INTERNAL MEDICINE

## 2018-01-01 PROCEDURE — 94640 AIRWAY INHALATION TREATMENT: CPT

## 2018-01-01 PROCEDURE — 74011000258 HC RX REV CODE- 258: Performed by: INTERNAL MEDICINE

## 2018-01-01 PROCEDURE — 83735 ASSAY OF MAGNESIUM: CPT | Performed by: INTERNAL MEDICINE

## 2018-01-01 PROCEDURE — 74011000258 HC RX REV CODE- 258: Performed by: FAMILY MEDICINE

## 2018-01-01 PROCEDURE — 74011250636 HC RX REV CODE- 250/636: Performed by: INTERNAL MEDICINE

## 2018-01-01 PROCEDURE — 87177 OVA AND PARASITES SMEARS: CPT | Performed by: FAMILY MEDICINE

## 2018-01-01 PROCEDURE — 36600 WITHDRAWAL OF ARTERIAL BLOOD: CPT

## 2018-01-01 PROCEDURE — 87040 BLOOD CULTURE FOR BACTERIA: CPT | Performed by: FAMILY MEDICINE

## 2018-01-01 PROCEDURE — 80307 DRUG TEST PRSMV CHEM ANLYZR: CPT | Performed by: FAMILY MEDICINE

## 2018-01-01 PROCEDURE — 82140 ASSAY OF AMMONIA: CPT | Performed by: FAMILY MEDICINE

## 2018-01-01 PROCEDURE — 74011250636 HC RX REV CODE- 250/636: Performed by: HOSPITALIST

## 2018-01-01 PROCEDURE — 94003 VENT MGMT INPAT SUBQ DAY: CPT

## 2018-01-01 PROCEDURE — 86256 FLUORESCENT ANTIBODY TITER: CPT | Performed by: INTERNAL MEDICINE

## 2018-01-01 PROCEDURE — 02HV33Z INSERTION OF INFUSION DEVICE INTO SUPERIOR VENA CAVA, PERCUTANEOUS APPROACH: ICD-10-PCS | Performed by: EMERGENCY MEDICINE

## 2018-01-01 PROCEDURE — 84100 ASSAY OF PHOSPHORUS: CPT | Performed by: INTERNAL MEDICINE

## 2018-01-01 PROCEDURE — 87147 CULTURE TYPE IMMUNOLOGIC: CPT | Performed by: INTERNAL MEDICINE

## 2018-01-01 PROCEDURE — C1894 INTRO/SHEATH, NON-LASER: HCPCS

## 2018-01-01 PROCEDURE — 87804 INFLUENZA ASSAY W/OPTIC: CPT | Performed by: EMERGENCY MEDICINE

## 2018-01-01 PROCEDURE — 86704 HEP B CORE ANTIBODY TOTAL: CPT | Performed by: INTERNAL MEDICINE

## 2018-01-01 PROCEDURE — 74011000250 HC RX REV CODE- 250: Performed by: INTERNAL MEDICINE

## 2018-01-01 PROCEDURE — 85730 THROMBOPLASTIN TIME PARTIAL: CPT | Performed by: NURSE PRACTITIONER

## 2018-01-01 PROCEDURE — C1751 CATH, INF, PER/CENT/MIDLINE: HCPCS

## 2018-01-01 PROCEDURE — 31500 INSERT EMERGENCY AIRWAY: CPT

## 2018-01-01 PROCEDURE — 87040 BLOOD CULTURE FOR BACTERIA: CPT | Performed by: INTERNAL MEDICINE

## 2018-01-01 PROCEDURE — 80053 COMPREHEN METABOLIC PANEL: CPT | Performed by: INTERNAL MEDICINE

## 2018-01-01 PROCEDURE — 80307 DRUG TEST PRSMV CHEM ANLYZR: CPT | Performed by: EMERGENCY MEDICINE

## 2018-01-01 PROCEDURE — 82962 GLUCOSE BLOOD TEST: CPT

## 2018-01-01 PROCEDURE — 93306 TTE W/DOPPLER COMPLETE: CPT

## 2018-01-01 PROCEDURE — 74011250636 HC RX REV CODE- 250/636: Performed by: FAMILY MEDICINE

## 2018-01-01 PROCEDURE — 82803 BLOOD GASES ANY COMBINATION: CPT

## 2018-01-01 PROCEDURE — 77030013140 HC MSK NEB VYRM -A

## 2018-01-01 PROCEDURE — 96365 THER/PROPH/DIAG IV INF INIT: CPT

## 2018-01-01 PROCEDURE — 84300 ASSAY OF URINE SODIUM: CPT | Performed by: NURSE PRACTITIONER

## 2018-01-01 PROCEDURE — 87116 MYCOBACTERIA CULTURE: CPT | Performed by: HOSPITALIST

## 2018-01-01 PROCEDURE — 74011250636 HC RX REV CODE- 250/636

## 2018-01-01 PROCEDURE — 85730 THROMBOPLASTIN TIME PARTIAL: CPT | Performed by: FAMILY MEDICINE

## 2018-01-01 PROCEDURE — 86140 C-REACTIVE PROTEIN: CPT | Performed by: EMERGENCY MEDICINE

## 2018-01-01 PROCEDURE — 80202 ASSAY OF VANCOMYCIN: CPT | Performed by: INTERNAL MEDICINE

## 2018-01-01 PROCEDURE — 96375 TX/PRO/DX INJ NEW DRUG ADDON: CPT

## 2018-01-01 PROCEDURE — 71250 CT THORAX DX C-: CPT

## 2018-01-01 PROCEDURE — 77030032490 HC SLV COMPR SCD KNE COVD -B

## 2018-01-01 PROCEDURE — 84443 ASSAY THYROID STIM HORMONE: CPT | Performed by: INTERNAL MEDICINE

## 2018-01-01 PROCEDURE — 85610 PROTHROMBIN TIME: CPT | Performed by: FAMILY MEDICINE

## 2018-01-01 PROCEDURE — 65270000029 HC RM PRIVATE

## 2018-01-01 PROCEDURE — 74011000302 HC RX REV CODE- 302: Performed by: NURSE PRACTITIONER

## 2018-01-01 PROCEDURE — 87186 SC STD MICRODIL/AGAR DIL: CPT | Performed by: INTERNAL MEDICINE

## 2018-01-01 PROCEDURE — 84100 ASSAY OF PHOSPHORUS: CPT | Performed by: FAMILY MEDICINE

## 2018-01-01 PROCEDURE — 71045 X-RAY EXAM CHEST 1 VIEW: CPT

## 2018-01-01 PROCEDURE — 74011250636 HC RX REV CODE- 250/636: Performed by: NURSE PRACTITIONER

## 2018-01-01 PROCEDURE — 87070 CULTURE OTHR SPECIMN AEROBIC: CPT | Performed by: INTERNAL MEDICINE

## 2018-01-01 PROCEDURE — 65620000000 HC RM CCU GENERAL

## 2018-01-01 PROCEDURE — 85362 FIBRIN DEGRADATION PRODUCTS: CPT | Performed by: FAMILY MEDICINE

## 2018-01-01 PROCEDURE — 5A1223Z PERFORMANCE OF CARDIAC PACING, CONTINUOUS: ICD-10-PCS | Performed by: INTERNAL MEDICINE

## 2018-01-01 PROCEDURE — 85610 PROTHROMBIN TIME: CPT | Performed by: HOSPITALIST

## 2018-01-01 PROCEDURE — 80061 LIPID PANEL: CPT | Performed by: FAMILY MEDICINE

## 2018-01-01 PROCEDURE — 83605 ASSAY OF LACTIC ACID: CPT | Performed by: INTERNAL MEDICINE

## 2018-01-01 PROCEDURE — 85379 FIBRIN DEGRADATION QUANT: CPT | Performed by: FAMILY MEDICINE

## 2018-01-01 PROCEDURE — 80202 ASSAY OF VANCOMYCIN: CPT | Performed by: HOSPITALIST

## 2018-01-01 PROCEDURE — 76705 ECHO EXAM OF ABDOMEN: CPT

## 2018-01-01 PROCEDURE — 36415 COLL VENOUS BLD VENIPUNCTURE: CPT | Performed by: FAMILY MEDICINE

## 2018-01-01 PROCEDURE — 74011000250 HC RX REV CODE- 250

## 2018-01-01 PROCEDURE — 74011250637 HC RX REV CODE- 250/637: Performed by: HOSPITALIST

## 2018-01-01 PROCEDURE — 70450 CT HEAD/BRAIN W/O DYE: CPT

## 2018-01-01 PROCEDURE — 86580 TB INTRADERMAL TEST: CPT | Performed by: NURSE PRACTITIONER

## 2018-01-01 PROCEDURE — 82550 ASSAY OF CK (CPK): CPT | Performed by: EMERGENCY MEDICINE

## 2018-01-01 PROCEDURE — 80053 COMPREHEN METABOLIC PANEL: CPT | Performed by: FAMILY MEDICINE

## 2018-01-01 PROCEDURE — 87389 HIV-1 AG W/HIV-1&-2 AB AG IA: CPT | Performed by: NURSE PRACTITIONER

## 2018-01-01 PROCEDURE — 77030011256 HC DRSG MEPILEX <16IN NO BORD MOLN -A

## 2018-01-01 PROCEDURE — 65610000006 HC RM INTENSIVE CARE

## 2018-01-01 PROCEDURE — 85025 COMPLETE CBC W/AUTO DIFF WBC: CPT | Performed by: INTERNAL MEDICINE

## 2018-01-01 PROCEDURE — 85025 COMPLETE CBC W/AUTO DIFF WBC: CPT | Performed by: EMERGENCY MEDICINE

## 2018-01-01 PROCEDURE — 80048 BASIC METABOLIC PNL TOTAL CA: CPT | Performed by: FAMILY MEDICINE

## 2018-01-01 PROCEDURE — 77030013744

## 2018-01-01 PROCEDURE — 76770 US EXAM ABDO BACK WALL COMP: CPT

## 2018-01-01 PROCEDURE — 99285 EMERGENCY DEPT VISIT HI MDM: CPT

## 2018-01-01 PROCEDURE — 5A12012 PERFORMANCE OF CARDIAC OUTPUT, SINGLE, MANUAL: ICD-10-PCS | Performed by: ANESTHESIOLOGY

## 2018-01-01 PROCEDURE — 87077 CULTURE AEROBIC IDENTIFY: CPT | Performed by: INTERNAL MEDICINE

## 2018-01-01 PROCEDURE — 83605 ASSAY OF LACTIC ACID: CPT | Performed by: FAMILY MEDICINE

## 2018-01-01 PROCEDURE — 83735 ASSAY OF MAGNESIUM: CPT | Performed by: FAMILY MEDICINE

## 2018-01-01 PROCEDURE — 74011000258 HC RX REV CODE- 258: Performed by: HOSPITALIST

## 2018-01-01 PROCEDURE — 77030013797 HC KT TRNSDUC PRSSR EDWD -A

## 2018-01-01 PROCEDURE — 87186 SC STD MICRODIL/AGAR DIL: CPT | Performed by: FAMILY MEDICINE

## 2018-01-01 PROCEDURE — 74011250636 HC RX REV CODE- 250/636: Performed by: EMERGENCY MEDICINE

## 2018-01-01 PROCEDURE — 87077 CULTURE AEROBIC IDENTIFY: CPT | Performed by: FAMILY MEDICINE

## 2018-01-01 PROCEDURE — 82550 ASSAY OF CK (CPK): CPT | Performed by: INTERNAL MEDICINE

## 2018-01-01 PROCEDURE — 85610 PROTHROMBIN TIME: CPT | Performed by: INTERNAL MEDICINE

## 2018-01-01 PROCEDURE — 80047 BASIC METABLC PNL IONIZED CA: CPT

## 2018-01-01 PROCEDURE — 85027 COMPLETE CBC AUTOMATED: CPT | Performed by: INTERNAL MEDICINE

## 2018-01-01 PROCEDURE — 80053 COMPREHEN METABOLIC PANEL: CPT | Performed by: EMERGENCY MEDICINE

## 2018-01-01 PROCEDURE — 93005 ELECTROCARDIOGRAM TRACING: CPT

## 2018-01-01 PROCEDURE — 77030005514 HC CATH URETH FOL14 BARD -A

## 2018-01-01 PROCEDURE — 93041 RHYTHM ECG TRACING: CPT

## 2018-01-01 PROCEDURE — 83605 ASSAY OF LACTIC ACID: CPT

## 2018-01-01 PROCEDURE — 74011250637 HC RX REV CODE- 250/637: Performed by: EMERGENCY MEDICINE

## 2018-01-01 PROCEDURE — 94002 VENT MGMT INPAT INIT DAY: CPT

## 2018-01-01 PROCEDURE — 74018 RADEX ABDOMEN 1 VIEW: CPT

## 2018-01-01 PROCEDURE — 83880 ASSAY OF NATRIURETIC PEPTIDE: CPT | Performed by: INTERNAL MEDICINE

## 2018-01-01 PROCEDURE — 74011000250 HC RX REV CODE- 250: Performed by: HOSPITALIST

## 2018-01-01 PROCEDURE — 83690 ASSAY OF LIPASE: CPT | Performed by: EMERGENCY MEDICINE

## 2018-01-01 PROCEDURE — 75810000137 HC PLCMT CENT VENOUS CATH

## 2018-01-01 PROCEDURE — 82272 OCCULT BLD FECES 1-3 TESTS: CPT | Performed by: FAMILY MEDICINE

## 2018-01-01 PROCEDURE — 76450000000

## 2018-01-01 PROCEDURE — 5A1955Z RESPIRATORY VENTILATION, GREATER THAN 96 CONSECUTIVE HOURS: ICD-10-PCS | Performed by: ANESTHESIOLOGY

## 2018-01-01 PROCEDURE — 77030002996 HC SUT SLK J&J -A

## 2018-01-01 PROCEDURE — 84443 ASSAY THYROID STIM HORMONE: CPT | Performed by: FAMILY MEDICINE

## 2018-01-01 PROCEDURE — 36415 COLL VENOUS BLD VENIPUNCTURE: CPT | Performed by: HOSPITALIST

## 2018-01-01 PROCEDURE — 85384 FIBRINOGEN ACTIVITY: CPT | Performed by: FAMILY MEDICINE

## 2018-01-01 PROCEDURE — 0BH17EZ INSERTION OF ENDOTRACHEAL AIRWAY INTO TRACHEA, VIA NATURAL OR ARTIFICIAL OPENING: ICD-10-PCS | Performed by: ANESTHESIOLOGY

## 2018-01-01 PROCEDURE — 87340 HEPATITIS B SURFACE AG IA: CPT | Performed by: INTERNAL MEDICINE

## 2018-01-01 PROCEDURE — 85025 COMPLETE CBC W/AUTO DIFF WBC: CPT | Performed by: FAMILY MEDICINE

## 2018-01-01 PROCEDURE — 86480 TB TEST CELL IMMUN MEASURE: CPT | Performed by: FAMILY MEDICINE

## 2018-01-01 PROCEDURE — 77030019563 HC DEV ATTCH FEED HOLL -A

## 2018-01-01 PROCEDURE — 96367 TX/PROPH/DG ADDL SEQ IV INF: CPT

## 2018-01-01 PROCEDURE — 33210 INSERT ELECTRD/PM CATH SNGL: CPT

## 2018-01-01 PROCEDURE — 86705 HEP B CORE ANTIBODY IGM: CPT | Performed by: INTERNAL MEDICINE

## 2018-01-01 PROCEDURE — 51702 INSERT TEMP BLADDER CATH: CPT

## 2018-01-01 PROCEDURE — 83935 ASSAY OF URINE OSMOLALITY: CPT | Performed by: NURSE PRACTITIONER

## 2018-01-01 PROCEDURE — 74011000258 HC RX REV CODE- 258: Performed by: EMERGENCY MEDICINE

## 2018-01-01 PROCEDURE — 36415 COLL VENOUS BLD VENIPUNCTURE: CPT | Performed by: INTERNAL MEDICINE

## 2018-01-01 PROCEDURE — 87206 SMEAR FLUORESCENT/ACID STAI: CPT | Performed by: HOSPITALIST

## 2018-01-01 PROCEDURE — 77030018798 HC PMP KT ENTRL FED COVD -A

## 2018-01-01 PROCEDURE — 77030005320 HC CATH PACE TEMP STJU -B

## 2018-01-01 PROCEDURE — 77010033678 HC OXYGEN DAILY

## 2018-01-01 PROCEDURE — 81001 URINALYSIS AUTO W/SCOPE: CPT | Performed by: EMERGENCY MEDICINE

## 2018-01-01 PROCEDURE — 85025 COMPLETE CBC W/AUTO DIFF WBC: CPT | Performed by: HOSPITALIST

## 2018-01-01 PROCEDURE — 36415 COLL VENOUS BLD VENIPUNCTURE: CPT | Performed by: EMERGENCY MEDICINE

## 2018-01-01 PROCEDURE — 85652 RBC SED RATE AUTOMATED: CPT | Performed by: EMERGENCY MEDICINE

## 2018-01-01 PROCEDURE — 74176 CT ABD & PELVIS W/O CONTRAST: CPT

## 2018-01-01 RX ORDER — SUCCINYLCHOLINE CHLORIDE 20 MG/ML
INJECTION INTRAMUSCULAR; INTRAVENOUS
Status: COMPLETED
Start: 2018-01-01 | End: 2018-01-01

## 2018-01-01 RX ORDER — ATROPINE SULFATE 0.1 MG/ML
1 INJECTION INTRAVENOUS AS NEEDED
Status: DISCONTINUED | OUTPATIENT
Start: 2018-01-01 | End: 2018-01-01

## 2018-01-01 RX ORDER — LORAZEPAM 2 MG/ML
2 INJECTION INTRAMUSCULAR ONCE
Status: COMPLETED | OUTPATIENT
Start: 2018-01-01 | End: 2018-01-01

## 2018-01-01 RX ORDER — SODIUM CHLORIDE 0.9 % (FLUSH) 0.9 %
5-10 SYRINGE (ML) INJECTION EVERY 8 HOURS
Status: DISCONTINUED | OUTPATIENT
Start: 2018-01-01 | End: 2018-01-01

## 2018-01-01 RX ORDER — LIDOCAINE HYDROCHLORIDE 10 MG/ML
10-30 INJECTION INFILTRATION; PERINEURAL
Status: DISCONTINUED | OUTPATIENT
Start: 2018-01-01 | End: 2018-01-01

## 2018-01-01 RX ORDER — SODIUM CHLORIDE 9 MG/ML
INJECTION, SOLUTION INTRAVENOUS
Status: COMPLETED
Start: 2018-01-01 | End: 2018-01-01

## 2018-01-01 RX ORDER — PIPERACILLIN SODIUM, TAZOBACTAM SODIUM 3; .375 G/15ML; G/15ML
INJECTION, POWDER, LYOPHILIZED, FOR SOLUTION INTRAVENOUS
Status: DISPENSED
Start: 2018-01-01 | End: 2018-01-01

## 2018-01-01 RX ORDER — MIDAZOLAM HYDROCHLORIDE 1 MG/ML
.5-1 INJECTION, SOLUTION INTRAMUSCULAR; INTRAVENOUS
Status: DISCONTINUED | OUTPATIENT
Start: 2018-01-01 | End: 2018-01-01

## 2018-01-01 RX ORDER — ETOMIDATE 2 MG/ML
INJECTION INTRAVENOUS
Status: COMPLETED
Start: 2018-01-01 | End: 2018-01-01

## 2018-01-01 RX ORDER — PHENYLEPHRINE HCL IN 0.9% NACL 30MG/250ML
10-100 PLASTIC BAG, INJECTION (ML) INTRAVENOUS
Status: DISCONTINUED | OUTPATIENT
Start: 2018-01-01 | End: 2018-01-01

## 2018-01-01 RX ORDER — GLYCOPYRROLATE 0.2 MG/ML
0.2 INJECTION INTRAMUSCULAR; INTRAVENOUS ONCE
Status: DISPENSED | OUTPATIENT
Start: 2018-01-01 | End: 2018-01-01

## 2018-01-01 RX ORDER — LORAZEPAM 2 MG/ML
2 INJECTION INTRAMUSCULAR
Status: DISCONTINUED | OUTPATIENT
Start: 2018-01-01 | End: 2018-01-01 | Stop reason: HOSPADM

## 2018-01-01 RX ORDER — MORPHINE SULFATE 1 MG/ML
2 INJECTION, SOLUTION EPIDURAL; INTRATHECAL; INTRAVENOUS
Status: DISPENSED | OUTPATIENT
Start: 2018-01-01 | End: 2018-01-01

## 2018-01-01 RX ORDER — ACETAMINOPHEN 650 MG/1
SUPPOSITORY RECTAL
Status: DISPENSED
Start: 2018-01-01 | End: 2018-01-01

## 2018-01-01 RX ORDER — PROPOFOL 10 MG/ML
INJECTION, EMULSION INTRAVENOUS
Status: COMPLETED
Start: 2018-01-01 | End: 2018-01-01

## 2018-01-01 RX ORDER — PROPOFOL 10 MG/ML
0-30 VIAL (ML) INTRAVENOUS
Status: DISCONTINUED | OUTPATIENT
Start: 2018-01-01 | End: 2018-01-01

## 2018-01-01 RX ORDER — LEVOFLOXACIN 5 MG/ML
750 INJECTION, SOLUTION INTRAVENOUS
Status: DISCONTINUED | OUTPATIENT
Start: 2018-01-01 | End: 2018-01-01

## 2018-01-01 RX ORDER — PHENYLEPHRINE HCL IN 0.9% NACL 30MG/250ML
10-300 PLASTIC BAG, INJECTION (ML) INTRAVENOUS
Status: DISCONTINUED | OUTPATIENT
Start: 2018-01-01 | End: 2018-01-01

## 2018-01-01 RX ORDER — CHLORHEXIDINE GLUCONATE 1.2 MG/ML
15 RINSE ORAL 2 TIMES DAILY
Status: DISCONTINUED | OUTPATIENT
Start: 2018-01-01 | End: 2018-01-01 | Stop reason: HOSPADM

## 2018-01-01 RX ORDER — VANCOMYCIN HYDROCHLORIDE
1250 ONCE
Status: COMPLETED | OUTPATIENT
Start: 2018-01-01 | End: 2018-01-01

## 2018-01-01 RX ORDER — SODIUM CHLORIDE, SODIUM LACTATE, POTASSIUM CHLORIDE, CALCIUM CHLORIDE 600; 310; 30; 20 MG/100ML; MG/100ML; MG/100ML; MG/100ML
125 INJECTION, SOLUTION INTRAVENOUS CONTINUOUS
Status: DISCONTINUED | OUTPATIENT
Start: 2018-01-01 | End: 2018-01-01

## 2018-01-01 RX ORDER — SODIUM CHLORIDE 0.9 % (FLUSH) 0.9 %
5-10 SYRINGE (ML) INJECTION AS NEEDED
Status: DISCONTINUED | OUTPATIENT
Start: 2018-01-01 | End: 2018-01-01

## 2018-01-01 RX ORDER — AMIODARONE HYDROCHLORIDE 200 MG/1
400 TABLET ORAL EVERY 8 HOURS
Status: DISCONTINUED | OUTPATIENT
Start: 2018-01-01 | End: 2018-01-01

## 2018-01-01 RX ORDER — GLYCOPYRROLATE 0.2 MG/ML
0.2 INJECTION INTRAMUSCULAR; INTRAVENOUS ONCE
Status: COMPLETED | OUTPATIENT
Start: 2018-01-01 | End: 2018-01-01

## 2018-01-01 RX ORDER — BUMETANIDE 0.25 MG/ML
1 INJECTION INTRAMUSCULAR; INTRAVENOUS 2 TIMES DAILY
Status: DISCONTINUED | OUTPATIENT
Start: 2018-01-01 | End: 2018-01-01 | Stop reason: HOSPADM

## 2018-01-01 RX ORDER — SODIUM CHLORIDE 450 MG/100ML
75 INJECTION, SOLUTION INTRAVENOUS CONTINUOUS
Status: DISCONTINUED | OUTPATIENT
Start: 2018-01-01 | End: 2018-01-01

## 2018-01-01 RX ORDER — VANCOMYCIN HYDROCHLORIDE
1250
Status: DISCONTINUED | OUTPATIENT
Start: 2018-01-01 | End: 2018-01-01

## 2018-01-01 RX ORDER — SODIUM CHLORIDE 0.9 % (FLUSH) 0.9 %
10 SYRINGE (ML) INJECTION AS NEEDED
Status: DISCONTINUED | OUTPATIENT
Start: 2018-01-01 | End: 2018-01-01

## 2018-01-01 RX ORDER — METOPROLOL TARTRATE 5 MG/5ML
5 INJECTION INTRAVENOUS EVERY 6 HOURS
Status: DISCONTINUED | OUTPATIENT
Start: 2018-01-01 | End: 2018-01-01

## 2018-01-01 RX ORDER — PHENYLEPHRINE HCL IN 0.9% NACL 0.4MG/10ML
100-400 SYRINGE (ML) INTRAVENOUS AS NEEDED
Status: DISCONTINUED | OUTPATIENT
Start: 2018-01-01 | End: 2018-01-01

## 2018-01-01 RX ORDER — VANCOMYCIN 2 GRAM/500 ML IN 0.9 % SODIUM CHLORIDE INTRAVENOUS
2000 ONCE
Status: COMPLETED | OUTPATIENT
Start: 2018-01-01 | End: 2018-01-01

## 2018-01-01 RX ORDER — LEVOFLOXACIN 5 MG/ML
750 INJECTION, SOLUTION INTRAVENOUS ONCE
Status: COMPLETED | OUTPATIENT
Start: 2018-01-01 | End: 2018-01-01

## 2018-01-01 RX ORDER — LEVOFLOXACIN 5 MG/ML
INJECTION, SOLUTION INTRAVENOUS
Status: DISPENSED
Start: 2018-01-01 | End: 2018-01-01

## 2018-01-01 RX ORDER — SODIUM CHLORIDE 9 MG/ML
100 INJECTION, SOLUTION INTRAVENOUS CONTINUOUS
Status: DISCONTINUED | OUTPATIENT
Start: 2018-01-01 | End: 2018-01-01

## 2018-01-01 RX ORDER — LORAZEPAM 2 MG/ML
2 INJECTION INTRAMUSCULAR
Status: DISCONTINUED | OUTPATIENT
Start: 2018-01-01 | End: 2018-01-01

## 2018-01-01 RX ORDER — HEPARIN SODIUM 200 [USP'U]/100ML
1000 INJECTION, SOLUTION INTRAVENOUS CONTINUOUS
Status: DISCONTINUED | OUTPATIENT
Start: 2018-01-01 | End: 2018-01-01 | Stop reason: HOSPADM

## 2018-01-01 RX ORDER — ACETAMINOPHEN 650 MG/1
975 SUPPOSITORY RECTAL
Status: COMPLETED | OUTPATIENT
Start: 2018-01-01 | End: 2018-01-01

## 2018-01-01 RX ORDER — ACETAMINOPHEN 325 MG/1
975 TABLET ORAL
Status: DISCONTINUED | OUTPATIENT
Start: 2018-01-01 | End: 2018-01-01

## 2018-01-01 RX ORDER — SODIUM CHLORIDE 0.9 % (FLUSH) 0.9 %
10 SYRINGE (ML) INJECTION AS NEEDED
Status: DISCONTINUED | OUTPATIENT
Start: 2018-01-01 | End: 2018-01-01 | Stop reason: HOSPADM

## 2018-01-01 RX ORDER — MORPHINE SULFATE 1 MG/ML
2 INJECTION, SOLUTION EPIDURAL; INTRATHECAL; INTRAVENOUS
Status: DISCONTINUED | OUTPATIENT
Start: 2018-01-01 | End: 2018-01-01

## 2018-01-01 RX ORDER — BACITRACIN 500 UNIT/G
PACKET (EA) TOPICAL
Status: COMPLETED
Start: 2018-01-01 | End: 2018-01-01

## 2018-01-01 RX ORDER — DOBUTAMINE HYDROCHLORIDE 200 MG/100ML
0-10 INJECTION INTRAVENOUS
Status: DISCONTINUED | OUTPATIENT
Start: 2018-01-01 | End: 2018-01-01 | Stop reason: SDUPTHER

## 2018-01-01 RX ORDER — DEXTROSE MONOHYDRATE 50 MG/ML
75 INJECTION, SOLUTION INTRAVENOUS CONTINUOUS
Status: DISCONTINUED | OUTPATIENT
Start: 2018-01-01 | End: 2018-01-01

## 2018-01-01 RX ORDER — FENTANYL CITRATE-0.9 % NACL/PF 900MCG/30
PATIENT CONTROLLED ANALGESIA VIAL INJECTION
Status: DISCONTINUED | OUTPATIENT
Start: 2018-01-01 | End: 2018-01-01 | Stop reason: HOSPADM

## 2018-01-01 RX ORDER — HALOPERIDOL 5 MG/ML
2 INJECTION INTRAMUSCULAR
Status: DISCONTINUED | OUTPATIENT
Start: 2018-01-01 | End: 2018-01-01 | Stop reason: HOSPADM

## 2018-01-01 RX ORDER — SODIUM CHLORIDE 0.9 % (FLUSH) 0.9 %
10 SYRINGE (ML) INJECTION
Status: ACTIVE | OUTPATIENT
Start: 2018-01-01 | End: 2018-01-01

## 2018-01-01 RX ORDER — FENTANYL CITRATE 50 UG/ML
25-200 INJECTION, SOLUTION INTRAMUSCULAR; INTRAVENOUS
Status: DISCONTINUED | OUTPATIENT
Start: 2018-01-01 | End: 2018-01-01

## 2018-01-01 RX ORDER — SODIUM CHLORIDE 0.9 % (FLUSH) 0.9 %
10 SYRINGE (ML) INJECTION EVERY 24 HOURS
Status: DISCONTINUED | OUTPATIENT
Start: 2018-01-01 | End: 2018-01-01

## 2018-01-01 RX ORDER — METOPROLOL TARTRATE 5 MG/5ML
2.5 INJECTION INTRAVENOUS EVERY 6 HOURS
Status: DISCONTINUED | OUTPATIENT
Start: 2018-01-01 | End: 2018-01-01

## 2018-01-01 RX ORDER — SODIUM CHLORIDE 0.9 % (FLUSH) 0.9 %
10 SYRINGE (ML) INJECTION EVERY 8 HOURS
Status: DISCONTINUED | OUTPATIENT
Start: 2018-01-01 | End: 2018-01-01

## 2018-01-01 RX ORDER — IPRATROPIUM BROMIDE AND ALBUTEROL SULFATE 2.5; .5 MG/3ML; MG/3ML
3 SOLUTION RESPIRATORY (INHALATION)
Status: DISCONTINUED | OUTPATIENT
Start: 2018-01-01 | End: 2018-01-01 | Stop reason: HOSPADM

## 2018-01-01 RX ORDER — IPRATROPIUM BROMIDE AND ALBUTEROL SULFATE 2.5; .5 MG/3ML; MG/3ML
3 SOLUTION RESPIRATORY (INHALATION)
Status: DISCONTINUED | OUTPATIENT
Start: 2018-01-01 | End: 2018-01-01

## 2018-01-01 RX ORDER — ACETAMINOPHEN 650 MG/1
650 SUPPOSITORY RECTAL
Status: DISCONTINUED | OUTPATIENT
Start: 2018-01-01 | End: 2018-01-01 | Stop reason: HOSPADM

## 2018-01-01 RX ORDER — GLYCOPYRROLATE 0.2 MG/ML
0.2 INJECTION INTRAMUSCULAR; INTRAVENOUS 3 TIMES DAILY
Status: DISCONTINUED | OUTPATIENT
Start: 2018-01-01 | End: 2018-01-01 | Stop reason: HOSPADM

## 2018-01-01 RX ORDER — SODIUM CHLORIDE, SODIUM LACTATE, POTASSIUM CHLORIDE, CALCIUM CHLORIDE 600; 310; 30; 20 MG/100ML; MG/100ML; MG/100ML; MG/100ML
75 INJECTION, SOLUTION INTRAVENOUS CONTINUOUS
Status: DISCONTINUED | OUTPATIENT
Start: 2018-01-01 | End: 2018-01-01

## 2018-01-01 RX ORDER — SODIUM CHLORIDE 0.9 % (FLUSH) 0.9 %
20 SYRINGE (ML) INJECTION AS NEEDED
Status: DISCONTINUED | OUTPATIENT
Start: 2018-01-01 | End: 2018-01-01 | Stop reason: HOSPADM

## 2018-01-01 RX ADMIN — AMIODARONE HYDROCHLORIDE 400 MG: 200 TABLET ORAL at 14:51

## 2018-01-01 RX ADMIN — AMIODARONE HYDROCHLORIDE 400 MG: 200 TABLET ORAL at 21:03

## 2018-01-01 RX ADMIN — SODIUM CHLORIDE, SODIUM LACTATE, POTASSIUM CHLORIDE, AND CALCIUM CHLORIDE 75 ML/HR: 600; 310; 30; 20 INJECTION, SOLUTION INTRAVENOUS at 12:45

## 2018-01-01 RX ADMIN — GLYCOPYRROLATE 0.2 MG: 0.2 INJECTION, SOLUTION INTRAMUSCULAR; INTRAVENOUS at 21:41

## 2018-01-01 RX ADMIN — SODIUM CHLORIDE 40 MG: 9 INJECTION INTRAMUSCULAR; INTRAVENOUS; SUBCUTANEOUS at 11:39

## 2018-01-01 RX ADMIN — Medication 10 ML: at 22:34

## 2018-01-01 RX ADMIN — SODIUM CHLORIDE 150 ML/HR: 900 INJECTION, SOLUTION INTRAVENOUS at 23:15

## 2018-01-01 RX ADMIN — Medication 10 ML: at 05:18

## 2018-01-01 RX ADMIN — Medication 10 ML: at 05:59

## 2018-01-01 RX ADMIN — Medication 10 ML: at 14:40

## 2018-01-01 RX ADMIN — LORAZEPAM 2 MG: 2 INJECTION INTRAMUSCULAR; INTRAVENOUS at 04:13

## 2018-01-01 RX ADMIN — PIPERACILLIN SODIUM,TAZOBACTAM SODIUM 3.38 G: 3; .375 INJECTION, POWDER, FOR SOLUTION INTRAVENOUS at 05:17

## 2018-01-01 RX ADMIN — Medication 10 ML: at 20:24

## 2018-01-01 RX ADMIN — Medication 10 ML: at 20:33

## 2018-01-01 RX ADMIN — SODIUM CHLORIDE 0.4 MCG/KG/HR: 900 INJECTION, SOLUTION INTRAVENOUS at 19:59

## 2018-01-01 RX ADMIN — SODIUM CHLORIDE 40 MG: 9 INJECTION INTRAMUSCULAR; INTRAVENOUS; SUBCUTANEOUS at 08:39

## 2018-01-01 RX ADMIN — Medication 90 MCG/MIN: at 05:29

## 2018-01-01 RX ADMIN — Medication 2 MG: at 20:57

## 2018-01-01 RX ADMIN — IPRATROPIUM BROMIDE AND ALBUTEROL SULFATE 3 ML: .5; 3 SOLUTION RESPIRATORY (INHALATION) at 01:24

## 2018-01-01 RX ADMIN — Medication 150 MCG/HR: at 10:32

## 2018-01-01 RX ADMIN — BUMETANIDE 1 MG: 0.25 INJECTION INTRAMUSCULAR; INTRAVENOUS at 15:24

## 2018-01-01 RX ADMIN — PIPERACILLIN SODIUM,TAZOBACTAM SODIUM 3.38 G: 3; .375 INJECTION, POWDER, FOR SOLUTION INTRAVENOUS at 20:26

## 2018-01-01 RX ADMIN — Medication 150 MCG/HR: at 22:36

## 2018-01-01 RX ADMIN — SODIUM CHLORIDE 150 ML/HR: 900 INJECTION, SOLUTION INTRAVENOUS at 17:14

## 2018-01-01 RX ADMIN — Medication 10 ML: at 00:20

## 2018-01-01 RX ADMIN — BACITRACIN 1 PACKET: 500 OINTMENT TOPICAL at 09:41

## 2018-01-01 RX ADMIN — CASTOR OIL AND BALSAM, PERU: 788; 87 OINTMENT TOPICAL at 05:10

## 2018-01-01 RX ADMIN — BUMETANIDE 1 MG: 0.25 INJECTION INTRAMUSCULAR; INTRAVENOUS at 09:09

## 2018-01-01 RX ADMIN — BUMETANIDE 1 MG: 0.25 INJECTION INTRAMUSCULAR; INTRAVENOUS at 08:30

## 2018-01-01 RX ADMIN — VANCOMYCIN HYDROCHLORIDE 1250 MG: 10 INJECTION, POWDER, LYOPHILIZED, FOR SOLUTION INTRAVENOUS at 08:51

## 2018-01-01 RX ADMIN — PIPERACILLIN SODIUM,TAZOBACTAM SODIUM 3.38 G: 3; .375 INJECTION, POWDER, FOR SOLUTION INTRAVENOUS at 20:23

## 2018-01-01 RX ADMIN — Medication 10 ML: at 22:50

## 2018-01-01 RX ADMIN — BUMETANIDE 1 MG: 0.25 INJECTION INTRAMUSCULAR; INTRAVENOUS at 15:14

## 2018-01-01 RX ADMIN — AMIODARONE HYDROCHLORIDE 0.5 MG/MIN: 50 INJECTION, SOLUTION INTRAVENOUS at 22:34

## 2018-01-01 RX ADMIN — IPRATROPIUM BROMIDE AND ALBUTEROL SULFATE 3 ML: .5; 3 SOLUTION RESPIRATORY (INHALATION) at 14:53

## 2018-01-01 RX ADMIN — Medication 2 MG: at 23:07

## 2018-01-01 RX ADMIN — SODIUM CHLORIDE 1000 ML: 900 INJECTION, SOLUTION INTRAVENOUS at 07:06

## 2018-01-01 RX ADMIN — CHLORHEXIDINE GLUCONATE 15 ML: 1.2 RINSE ORAL at 11:03

## 2018-01-01 RX ADMIN — Medication 10 ML: at 21:30

## 2018-01-01 RX ADMIN — PIPERACILLIN SODIUM,TAZOBACTAM SODIUM 3.38 G: 3; .375 INJECTION, POWDER, FOR SOLUTION INTRAVENOUS at 11:45

## 2018-01-01 RX ADMIN — CHLORHEXIDINE GLUCONATE 15 ML: 1.2 RINSE ORAL at 19:03

## 2018-01-01 RX ADMIN — BUMETANIDE 1 MG: 0.25 INJECTION INTRAMUSCULAR; INTRAVENOUS at 18:36

## 2018-01-01 RX ADMIN — PIPERACILLIN SODIUM,TAZOBACTAM SODIUM 3.38 G: 3; .375 INJECTION, POWDER, FOR SOLUTION INTRAVENOUS at 04:04

## 2018-01-01 RX ADMIN — Medication 150 MCG/HR: at 04:53

## 2018-01-01 RX ADMIN — PROPOFOL 20 MCG/KG/MIN: 10 INJECTION, EMULSION INTRAVENOUS at 16:48

## 2018-01-01 RX ADMIN — Medication 10 ML: at 23:10

## 2018-01-01 RX ADMIN — ACETAMINOPHEN 650 MG: 650 SOLUTION ORAL at 18:36

## 2018-01-01 RX ADMIN — CASTOR OIL AND BALSAM, PERU: 788; 87 OINTMENT TOPICAL at 14:36

## 2018-01-01 RX ADMIN — METOPROLOL TARTRATE 2.5 MG: 5 INJECTION, SOLUTION INTRAVENOUS at 19:06

## 2018-01-01 RX ADMIN — SODIUM CHLORIDE 40 MG: 9 INJECTION INTRAMUSCULAR; INTRAVENOUS; SUBCUTANEOUS at 08:30

## 2018-01-01 RX ADMIN — PIPERACILLIN SODIUM,TAZOBACTAM SODIUM 3.38 G: 3; .375 INJECTION, POWDER, FOR SOLUTION INTRAVENOUS at 04:49

## 2018-01-01 RX ADMIN — Medication 80 MCG/MIN: at 11:14

## 2018-01-01 RX ADMIN — Medication 100 MCG/MIN: at 18:56

## 2018-01-01 RX ADMIN — CASTOR OIL AND BALSAM, PERU: 788; 87 OINTMENT TOPICAL at 22:35

## 2018-01-01 RX ADMIN — Medication 150 MCG/HR: at 16:52

## 2018-01-01 RX ADMIN — BUMETANIDE 1 MG: 0.25 INJECTION INTRAMUSCULAR; INTRAVENOUS at 16:16

## 2018-01-01 RX ADMIN — SODIUM CHLORIDE 75 ML/HR: 450 INJECTION, SOLUTION INTRAVENOUS at 17:45

## 2018-01-01 RX ADMIN — Medication 80 MCG/MIN: at 16:17

## 2018-01-01 RX ADMIN — Medication 30 MCG/MIN: at 19:22

## 2018-01-01 RX ADMIN — Medication 150 MCG/HR: at 16:34

## 2018-01-01 RX ADMIN — BUMETANIDE 1 MG: 0.25 INJECTION INTRAMUSCULAR; INTRAVENOUS at 16:03

## 2018-01-01 RX ADMIN — Medication 10 ML: at 16:19

## 2018-01-01 RX ADMIN — VANCOMYCIN HYDROCHLORIDE 1250 MG: 10 INJECTION, POWDER, LYOPHILIZED, FOR SOLUTION INTRAVENOUS at 14:21

## 2018-01-01 RX ADMIN — LORAZEPAM 2 MG: 2 INJECTION INTRAMUSCULAR; INTRAVENOUS at 21:10

## 2018-01-01 RX ADMIN — Medication 2 MG: at 06:26

## 2018-01-01 RX ADMIN — AMIODARONE HYDROCHLORIDE 0.5 MG/MIN: 50 INJECTION, SOLUTION INTRAVENOUS at 05:29

## 2018-01-01 RX ADMIN — PROPOFOL 15 MCG/KG/MIN: 10 INJECTION, EMULSION INTRAVENOUS at 22:14

## 2018-01-01 RX ADMIN — CHLORHEXIDINE GLUCONATE 15 ML: 1.2 RINSE ORAL at 09:09

## 2018-01-01 RX ADMIN — ACETAMINOPHEN 650 MG: 650 SUPPOSITORY RECTAL at 15:24

## 2018-01-01 RX ADMIN — Medication: at 08:47

## 2018-01-01 RX ADMIN — PROPOFOL 20 MCG/KG/MIN: 10 INJECTION, EMULSION INTRAVENOUS at 11:18

## 2018-01-01 RX ADMIN — IPRATROPIUM BROMIDE AND ALBUTEROL SULFATE 3 ML: .5; 3 SOLUTION RESPIRATORY (INHALATION) at 14:59

## 2018-01-01 RX ADMIN — Medication 10 ML: at 21:42

## 2018-01-01 RX ADMIN — DEXTROSE MONOHYDRATE 2.5 MCG/KG/MIN: 50 INJECTION, SOLUTION INTRAVENOUS at 21:16

## 2018-01-01 RX ADMIN — SODIUM CHLORIDE 1.4 MCG/KG/HR: 900 INJECTION, SOLUTION INTRAVENOUS at 06:19

## 2018-01-01 RX ADMIN — CHLORHEXIDINE GLUCONATE 15 ML: 1.2 RINSE ORAL at 17:42

## 2018-01-01 RX ADMIN — BACITRACIN 1 PACKET: 500 OINTMENT TOPICAL at 08:25

## 2018-01-01 RX ADMIN — DOBUTAMINE IN DEXTROSE 2.5 MCG/KG/MIN: 200 INJECTION, SOLUTION INTRAVENOUS at 05:09

## 2018-01-01 RX ADMIN — IPRATROPIUM BROMIDE AND ALBUTEROL SULFATE 3 ML: .5; 3 SOLUTION RESPIRATORY (INHALATION) at 20:14

## 2018-01-01 RX ADMIN — Medication 50 MCG/MIN: at 18:35

## 2018-01-01 RX ADMIN — LORAZEPAM 2 MG: 2 INJECTION INTRAMUSCULAR; INTRAVENOUS at 01:19

## 2018-01-01 RX ADMIN — PIPERACILLIN SODIUM,TAZOBACTAM SODIUM 3.38 G: 3; .375 INJECTION, POWDER, FOR SOLUTION INTRAVENOUS at 11:27

## 2018-01-01 RX ADMIN — Medication 10 ML: at 05:23

## 2018-01-01 RX ADMIN — AMIODARONE HYDROCHLORIDE 1 MG/MIN: 50 INJECTION, SOLUTION INTRAVENOUS at 23:45

## 2018-01-01 RX ADMIN — METHYLPREDNISOLONE SODIUM SUCCINATE 125 MG: 125 INJECTION, POWDER, FOR SOLUTION INTRAMUSCULAR; INTRAVENOUS at 04:00

## 2018-01-01 RX ADMIN — Medication 10 ML: at 06:33

## 2018-01-01 RX ADMIN — Medication 150 MCG/HR: at 04:51

## 2018-01-01 RX ADMIN — CASTOR OIL AND BALSAM, PERU: 788; 87 OINTMENT TOPICAL at 16:28

## 2018-01-01 RX ADMIN — Medication 150 MCG/HR: at 10:51

## 2018-01-01 RX ADMIN — BUMETANIDE 1 MG: 0.25 INJECTION INTRAMUSCULAR; INTRAVENOUS at 08:05

## 2018-01-01 RX ADMIN — PROPOFOL 20 MCG/KG/MIN: 10 INJECTION, EMULSION INTRAVENOUS at 02:30

## 2018-01-01 RX ADMIN — Medication 150 MCG/HR: at 22:46

## 2018-01-01 RX ADMIN — SUCCINYLCHOLINE CHLORIDE 120 MG: 20 INJECTION, SOLUTION INTRAMUSCULAR; INTRAVENOUS; PARENTERAL at 20:10

## 2018-01-01 RX ADMIN — BUMETANIDE 1 MG: 0.25 INJECTION INTRAMUSCULAR; INTRAVENOUS at 08:39

## 2018-01-01 RX ADMIN — AMIODARONE HYDROCHLORIDE 400 MG: 200 TABLET ORAL at 05:23

## 2018-01-01 RX ADMIN — LORAZEPAM 2 MG: 2 INJECTION INTRAMUSCULAR; INTRAVENOUS at 00:09

## 2018-01-01 RX ADMIN — PIPERACILLIN SODIUM,TAZOBACTAM SODIUM 3.38 G: 3; .375 INJECTION, POWDER, FOR SOLUTION INTRAVENOUS at 03:52

## 2018-01-01 RX ADMIN — PIPERACILLIN SODIUM,TAZOBACTAM SODIUM 3.38 G: 3; .375 INJECTION, POWDER, FOR SOLUTION INTRAVENOUS at 20:31

## 2018-01-01 RX ADMIN — Medication 10 ML: at 21:37

## 2018-01-01 RX ADMIN — SODIUM CHLORIDE 1000 ML: 900 INJECTION, SOLUTION INTRAVENOUS at 08:12

## 2018-01-01 RX ADMIN — Medication 10 ML: at 14:21

## 2018-01-01 RX ADMIN — BUMETANIDE 1 MG: 0.25 INJECTION INTRAMUSCULAR; INTRAVENOUS at 17:42

## 2018-01-01 RX ADMIN — PIPERACILLIN SODIUM,TAZOBACTAM SODIUM 3.38 G: 3; .375 INJECTION, POWDER, FOR SOLUTION INTRAVENOUS at 11:50

## 2018-01-01 RX ADMIN — AMIODARONE HYDROCHLORIDE 150 MG: 50 INJECTION, SOLUTION INTRAVENOUS at 23:45

## 2018-01-01 RX ADMIN — Medication 10 ML: at 05:10

## 2018-01-01 RX ADMIN — Medication 10 ML: at 22:35

## 2018-01-01 RX ADMIN — LORAZEPAM 2 MG: 2 INJECTION INTRAMUSCULAR; INTRAVENOUS at 20:04

## 2018-01-01 RX ADMIN — IPRATROPIUM BROMIDE AND ALBUTEROL SULFATE 3 ML: .5; 3 SOLUTION RESPIRATORY (INHALATION) at 08:57

## 2018-01-01 RX ADMIN — PIPERACILLIN SODIUM,TAZOBACTAM SODIUM 3.38 G: 3; .375 INJECTION, POWDER, FOR SOLUTION INTRAVENOUS at 11:03

## 2018-01-01 RX ADMIN — LORAZEPAM 2 MG: 2 INJECTION INTRAMUSCULAR; INTRAVENOUS at 03:20

## 2018-01-01 RX ADMIN — Medication 150 MCG/HR: at 10:53

## 2018-01-01 RX ADMIN — IPRATROPIUM BROMIDE AND ALBUTEROL SULFATE 3 ML: .5; 3 SOLUTION RESPIRATORY (INHALATION) at 01:02

## 2018-01-01 RX ADMIN — DOBUTAMINE IN DEXTROSE 5 MCG/KG/MIN: 200 INJECTION, SOLUTION INTRAVENOUS at 23:01

## 2018-01-01 RX ADMIN — VANCOMYCIN HYDROCHLORIDE 1250 MG: 10 INJECTION, POWDER, LYOPHILIZED, FOR SOLUTION INTRAVENOUS at 05:33

## 2018-01-01 RX ADMIN — IPRATROPIUM BROMIDE AND ALBUTEROL SULFATE 3 ML: .5; 3 SOLUTION RESPIRATORY (INHALATION) at 09:20

## 2018-01-01 RX ADMIN — DEXTROSE MONOHYDRATE 75 ML/HR: 5 INJECTION, SOLUTION INTRAVENOUS at 10:55

## 2018-01-01 RX ADMIN — PROPOFOL 25 MCG/KG/MIN: 10 INJECTION, EMULSION INTRAVENOUS at 02:05

## 2018-01-01 RX ADMIN — AMIODARONE HYDROCHLORIDE 0.5 MG/MIN: 50 INJECTION, SOLUTION INTRAVENOUS at 09:02

## 2018-01-01 RX ADMIN — LORAZEPAM 2 MG: 2 INJECTION INTRAMUSCULAR; INTRAVENOUS at 08:20

## 2018-01-01 RX ADMIN — Medication 10 ML: at 21:57

## 2018-01-01 RX ADMIN — Medication 150 MCG/HR: at 23:11

## 2018-01-01 RX ADMIN — PIPERACILLIN SODIUM,TAZOBACTAM SODIUM 3.38 G: 3; .375 INJECTION, POWDER, FOR SOLUTION INTRAVENOUS at 04:24

## 2018-01-01 RX ADMIN — Medication 90 MCG/MIN: at 00:22

## 2018-01-01 RX ADMIN — PIPERACILLIN SODIUM,TAZOBACTAM SODIUM 3.38 G: 3; .375 INJECTION, POWDER, FOR SOLUTION INTRAVENOUS at 20:14

## 2018-01-01 RX ADMIN — AMIODARONE HYDROCHLORIDE 150 MG: 50 INJECTION, SOLUTION INTRAVENOUS at 06:17

## 2018-01-01 RX ADMIN — SODIUM CHLORIDE 1000 ML: 900 INJECTION, SOLUTION INTRAVENOUS at 13:24

## 2018-01-01 RX ADMIN — Medication 2 MG: at 20:05

## 2018-01-01 RX ADMIN — PROPOFOL 2 MCG/KG/MIN: 10 INJECTION, EMULSION INTRAVENOUS at 02:30

## 2018-01-01 RX ADMIN — Medication 10 ML: at 14:13

## 2018-01-01 RX ADMIN — PIPERACILLIN SODIUM,TAZOBACTAM SODIUM 3.38 G: 3; .375 INJECTION, POWDER, FOR SOLUTION INTRAVENOUS at 20:45

## 2018-01-01 RX ADMIN — Medication 10 ML: at 21:17

## 2018-01-01 RX ADMIN — IPRATROPIUM BROMIDE AND ALBUTEROL SULFATE 3 ML: .5; 3 SOLUTION RESPIRATORY (INHALATION) at 01:11

## 2018-01-01 RX ADMIN — Medication 100 MCG/MIN: at 00:59

## 2018-01-01 RX ADMIN — METOPROLOL TARTRATE 2.5 MG: 5 INJECTION, SOLUTION INTRAVENOUS at 11:28

## 2018-01-01 RX ADMIN — PIPERACILLIN SODIUM,TAZOBACTAM SODIUM 3.38 G: 3; .375 INJECTION, POWDER, FOR SOLUTION INTRAVENOUS at 11:17

## 2018-01-01 RX ADMIN — LORAZEPAM 2 MG: 2 INJECTION INTRAMUSCULAR; INTRAVENOUS at 11:45

## 2018-01-01 RX ADMIN — PROPOFOL 20 MCG/KG/MIN: 10 INJECTION, EMULSION INTRAVENOUS at 19:45

## 2018-01-01 RX ADMIN — SODIUM CHLORIDE 75 ML/HR: 450 INJECTION, SOLUTION INTRAVENOUS at 03:24

## 2018-01-01 RX ADMIN — CHLORHEXIDINE GLUCONATE 15 ML: 1.2 RINSE ORAL at 08:55

## 2018-01-01 RX ADMIN — TUBERCULIN PURIFIED PROTEIN DERIVATIVE 5 UNITS: 5 INJECTION INTRADERMAL at 19:11

## 2018-01-01 RX ADMIN — SODIUM CHLORIDE 40 MG: 9 INJECTION INTRAMUSCULAR; INTRAVENOUS; SUBCUTANEOUS at 09:09

## 2018-01-01 RX ADMIN — CHLORHEXIDINE GLUCONATE 15 ML: 1.2 RINSE ORAL at 09:02

## 2018-01-01 RX ADMIN — DEXTROSE MONOHYDRATE 2.5 MCG/KG/MIN: 50 INJECTION, SOLUTION INTRAVENOUS at 08:57

## 2018-01-01 RX ADMIN — LORAZEPAM 2 MG: 2 INJECTION INTRAMUSCULAR; INTRAVENOUS at 19:01

## 2018-01-01 RX ADMIN — Medication 150 MCG/HR: at 04:47

## 2018-01-01 RX ADMIN — IPRATROPIUM BROMIDE AND ALBUTEROL SULFATE 3 ML: .5; 3 SOLUTION RESPIRATORY (INHALATION) at 20:58

## 2018-01-01 RX ADMIN — Medication 10 ML: at 13:57

## 2018-01-01 RX ADMIN — VANCOMYCIN HYDROCHLORIDE 1250 MG: 10 INJECTION, POWDER, LYOPHILIZED, FOR SOLUTION INTRAVENOUS at 18:36

## 2018-01-01 RX ADMIN — DEXTROSE MONOHYDRATE 75 ML/HR: 5 INJECTION, SOLUTION INTRAVENOUS at 13:10

## 2018-01-01 RX ADMIN — CHLORHEXIDINE GLUCONATE 15 ML: 1.2 RINSE ORAL at 15:15

## 2018-01-01 RX ADMIN — ACETAMINOPHEN 650 MG: 650 SUPPOSITORY RECTAL at 12:02

## 2018-01-01 RX ADMIN — Medication 10 ML: at 21:03

## 2018-01-01 RX ADMIN — ACETAMINOPHEN 650 MG: 650 SUPPOSITORY RECTAL at 02:47

## 2018-01-01 RX ADMIN — PROPOFOL 25 MCG/KG/MIN: 10 INJECTION, EMULSION INTRAVENOUS at 10:57

## 2018-01-01 RX ADMIN — LORAZEPAM 2 MG: 2 INJECTION INTRAMUSCULAR; INTRAVENOUS at 23:06

## 2018-01-01 RX ADMIN — LIDOCAINE HYDROCHLORIDE 10 ML: 10 INJECTION, SOLUTION INFILTRATION; PERINEURAL at 16:04

## 2018-01-01 RX ADMIN — SODIUM CHLORIDE 40 MG: 9 INJECTION INTRAMUSCULAR; INTRAVENOUS; SUBCUTANEOUS at 09:01

## 2018-01-01 RX ADMIN — Medication 10 ML: at 06:10

## 2018-01-01 RX ADMIN — GLYCOPYRROLATE 0.2 MG: 0.2 INJECTION, SOLUTION INTRAMUSCULAR; INTRAVENOUS at 17:52

## 2018-01-01 RX ADMIN — Medication 80 MCG/MIN: at 23:18

## 2018-01-01 RX ADMIN — VANCOMYCIN HYDROCHLORIDE 1250 MG: 10 INJECTION, POWDER, LYOPHILIZED, FOR SOLUTION INTRAVENOUS at 23:25

## 2018-01-01 RX ADMIN — VANCOMYCIN HYDROCHLORIDE 1250 MG: 10 INJECTION, POWDER, LYOPHILIZED, FOR SOLUTION INTRAVENOUS at 04:51

## 2018-01-01 RX ADMIN — PIPERACILLIN SODIUM,TAZOBACTAM SODIUM 3.38 G: 3; .375 INJECTION, POWDER, FOR SOLUTION INTRAVENOUS at 03:31

## 2018-01-01 RX ADMIN — Medication 150 MCG/HR: at 22:53

## 2018-01-01 RX ADMIN — Medication 150 MCG/HR: at 17:59

## 2018-01-01 RX ADMIN — ACETAMINOPHEN 650 MG: 650 SUPPOSITORY RECTAL at 21:31

## 2018-01-01 RX ADMIN — Medication 10 ML: at 14:51

## 2018-01-01 RX ADMIN — BUMETANIDE 1 MG: 0.25 INJECTION INTRAMUSCULAR; INTRAVENOUS at 08:52

## 2018-01-01 RX ADMIN — GLYCOPYRROLATE 0.2 MG: 0.2 INJECTION, SOLUTION INTRAMUSCULAR; INTRAVENOUS at 14:29

## 2018-01-01 RX ADMIN — PROPOFOL 20 MCG/KG/MIN: 10 INJECTION, EMULSION INTRAVENOUS at 18:54

## 2018-01-01 RX ADMIN — CASTOR OIL AND BALSAM, PERU: 788; 87 OINTMENT TOPICAL at 05:12

## 2018-01-01 RX ADMIN — LORAZEPAM 2 MG: 2 INJECTION INTRAMUSCULAR; INTRAVENOUS at 17:49

## 2018-01-01 RX ADMIN — ACETAMINOPHEN 650 MG: 650 SOLUTION ORAL at 01:44

## 2018-01-01 RX ADMIN — Medication 100 MCG/MIN: at 06:02

## 2018-01-01 RX ADMIN — IPRATROPIUM BROMIDE AND ALBUTEROL SULFATE 3 ML: .5; 3 SOLUTION RESPIRATORY (INHALATION) at 08:17

## 2018-01-01 RX ADMIN — Medication 10 ML: at 16:18

## 2018-01-01 RX ADMIN — Medication 2 MG: at 19:01

## 2018-01-01 RX ADMIN — LORAZEPAM 2 MG: 2 INJECTION INTRAMUSCULAR; INTRAVENOUS at 02:24

## 2018-01-01 RX ADMIN — Medication 10 ML: at 11:47

## 2018-01-01 RX ADMIN — CASTOR OIL AND BALSAM, PERU: 788; 87 OINTMENT TOPICAL at 21:03

## 2018-01-01 RX ADMIN — BUMETANIDE 1 MG: 0.25 INJECTION INTRAMUSCULAR; INTRAVENOUS at 09:01

## 2018-01-01 RX ADMIN — Medication 80 MCG/MIN: at 17:13

## 2018-01-01 RX ADMIN — IPRATROPIUM BROMIDE AND ALBUTEROL SULFATE 3 ML: .5; 3 SOLUTION RESPIRATORY (INHALATION) at 15:02

## 2018-01-01 RX ADMIN — PROPOFOL 20 MCG/KG/MIN: 10 INJECTION, EMULSION INTRAVENOUS at 21:12

## 2018-01-01 RX ADMIN — CHLORHEXIDINE GLUCONATE 15 ML: 1.2 RINSE ORAL at 08:30

## 2018-01-01 RX ADMIN — LORAZEPAM 2 MG: 2 INJECTION INTRAMUSCULAR; INTRAVENOUS at 20:55

## 2018-01-01 RX ADMIN — Medication 2 MG: at 17:48

## 2018-01-01 RX ADMIN — PIPERACILLIN SODIUM,TAZOBACTAM SODIUM 3.38 G: 3; .375 INJECTION, POWDER, FOR SOLUTION INTRAVENOUS at 20:38

## 2018-01-01 RX ADMIN — IPRATROPIUM BROMIDE AND ALBUTEROL SULFATE 3 ML: .5; 3 SOLUTION RESPIRATORY (INHALATION) at 20:19

## 2018-01-01 RX ADMIN — Medication 10 ML: at 22:51

## 2018-01-01 RX ADMIN — Medication 10 ML: at 06:00

## 2018-01-01 RX ADMIN — Medication 150 MCG/HR: at 03:30

## 2018-01-01 RX ADMIN — SODIUM CHLORIDE 1.4 MCG/KG/HR: 900 INJECTION, SOLUTION INTRAVENOUS at 02:56

## 2018-01-01 RX ADMIN — Medication 150 MCG/HR: at 05:14

## 2018-01-01 RX ADMIN — Medication 100 MCG/MIN: at 10:12

## 2018-01-01 RX ADMIN — VANCOMYCIN HYDROCHLORIDE 1250 MG: 10 INJECTION, POWDER, LYOPHILIZED, FOR SOLUTION INTRAVENOUS at 06:17

## 2018-01-01 RX ADMIN — VANCOMYCIN HYDROCHLORIDE 1250 MG: 10 INJECTION, POWDER, LYOPHILIZED, FOR SOLUTION INTRAVENOUS at 00:08

## 2018-01-01 RX ADMIN — Medication 90 MCG/MIN: at 02:03

## 2018-01-01 RX ADMIN — IPRATROPIUM BROMIDE AND ALBUTEROL SULFATE 3 ML: .5; 3 SOLUTION RESPIRATORY (INHALATION) at 13:29

## 2018-01-01 RX ADMIN — ACETAMINOPHEN 650 MG: 650 SOLUTION ORAL at 08:38

## 2018-01-01 RX ADMIN — PROPOFOL 20 MCG/KG/MIN: 10 INJECTION, EMULSION INTRAVENOUS at 07:18

## 2018-01-01 RX ADMIN — PIPERACILLIN SODIUM,TAZOBACTAM SODIUM 3.38 G: 3; .375 INJECTION, POWDER, FOR SOLUTION INTRAVENOUS at 13:12

## 2018-01-01 RX ADMIN — SODIUM CHLORIDE, SODIUM LACTATE, POTASSIUM CHLORIDE, AND CALCIUM CHLORIDE 75 ML/HR: 600; 310; 30; 20 INJECTION, SOLUTION INTRAVENOUS at 10:06

## 2018-01-01 RX ADMIN — IPRATROPIUM BROMIDE AND ALBUTEROL SULFATE 3 ML: .5; 3 SOLUTION RESPIRATORY (INHALATION) at 01:15

## 2018-01-01 RX ADMIN — Medication 90 MCG/MIN: at 11:09

## 2018-01-01 RX ADMIN — ACETAMINOPHEN 975 MG: 650 SUPPOSITORY RECTAL at 04:00

## 2018-01-01 RX ADMIN — Medication 150 MCG/HR: at 21:25

## 2018-01-01 RX ADMIN — SODIUM CHLORIDE 1000 ML: 900 INJECTION, SOLUTION INTRAVENOUS at 09:11

## 2018-01-01 RX ADMIN — ETOMIDATE 16 MG: 2 INJECTION, SOLUTION INTRAVENOUS at 20:10

## 2018-01-01 RX ADMIN — PROPOFOL 20 MCG/KG/MIN: 10 INJECTION, EMULSION INTRAVENOUS at 10:06

## 2018-01-01 RX ADMIN — Medication 2 MG: at 00:09

## 2018-01-01 RX ADMIN — Medication 10 ML: at 21:31

## 2018-01-01 RX ADMIN — Medication 2 MG: at 22:18

## 2018-01-01 RX ADMIN — HEPARIN SODIUM 2000 UNITS: 200 INJECTION, SOLUTION INTRAVENOUS at 15:58

## 2018-01-01 RX ADMIN — Medication 2 MG: at 21:11

## 2018-01-01 RX ADMIN — DEXTROSE MONOHYDRATE 2.5 MCG/KG/MIN: 50 INJECTION, SOLUTION INTRAVENOUS at 11:41

## 2018-01-01 RX ADMIN — Medication 150 MCG/HR: at 16:54

## 2018-01-01 RX ADMIN — PIPERACILLIN SODIUM,TAZOBACTAM SODIUM 3.38 G: 3; .375 INJECTION, POWDER, FOR SOLUTION INTRAVENOUS at 12:02

## 2018-01-01 RX ADMIN — LEVOFLOXACIN 750 MG: 5 INJECTION, SOLUTION INTRAVENOUS at 05:30

## 2018-01-01 RX ADMIN — CHLORHEXIDINE GLUCONATE 15 ML: 1.2 RINSE ORAL at 18:17

## 2018-01-01 RX ADMIN — SODIUM CHLORIDE 40 MG: 9 INJECTION INTRAMUSCULAR; INTRAVENOUS; SUBCUTANEOUS at 08:52

## 2018-01-01 RX ADMIN — Medication 2 MG: at 01:19

## 2018-01-01 RX ADMIN — Medication 100 MCG/MIN: at 00:29

## 2018-01-01 RX ADMIN — Medication 50 MCG/HR: at 11:40

## 2018-01-01 RX ADMIN — VANCOMYCIN HYDROCHLORIDE 1250 MG: 10 INJECTION, POWDER, LYOPHILIZED, FOR SOLUTION INTRAVENOUS at 11:36

## 2018-01-01 RX ADMIN — PROPOFOL 20 MCG/KG/MIN: 10 INJECTION, EMULSION INTRAVENOUS at 05:10

## 2018-01-01 RX ADMIN — CHLORHEXIDINE GLUCONATE 15 ML: 1.2 RINSE ORAL at 08:05

## 2018-01-01 RX ADMIN — Medication 150 MCG/HR: at 11:01

## 2018-01-01 RX ADMIN — BUMETANIDE 1 MG: 0.25 INJECTION INTRAMUSCULAR; INTRAVENOUS at 16:28

## 2018-01-01 RX ADMIN — LORAZEPAM 2 MG: 2 INJECTION INTRAMUSCULAR; INTRAVENOUS at 22:18

## 2018-01-01 RX ADMIN — PIPERACILLIN SODIUM,TAZOBACTAM SODIUM 3.38 G: 3; .375 INJECTION, POWDER, FOR SOLUTION INTRAVENOUS at 04:30

## 2018-01-01 RX ADMIN — CHLORHEXIDINE GLUCONATE 15 ML: 1.2 RINSE ORAL at 08:00

## 2018-01-01 RX ADMIN — CHLORHEXIDINE GLUCONATE 15 ML: 1.2 RINSE ORAL at 19:45

## 2018-01-01 RX ADMIN — Medication 100 MCG/MIN: at 05:07

## 2018-01-01 RX ADMIN — SODIUM CHLORIDE 1000 ML: 900 INJECTION, SOLUTION INTRAVENOUS at 05:45

## 2018-01-01 RX ADMIN — VANCOMYCIN HYDROCHLORIDE 2000 MG: 10 INJECTION, POWDER, LYOPHILIZED, FOR SOLUTION INTRAVENOUS at 05:00

## 2018-01-01 RX ADMIN — IPRATROPIUM BROMIDE AND ALBUTEROL SULFATE 3 ML: .5; 3 SOLUTION RESPIRATORY (INHALATION) at 20:55

## 2018-01-01 RX ADMIN — Medication: at 21:39

## 2018-01-01 RX ADMIN — SODIUM CHLORIDE 75 ML/HR: 450 INJECTION, SOLUTION INTRAVENOUS at 14:39

## 2018-01-01 RX ADMIN — Medication 90 MCG/MIN: at 12:23

## 2018-01-01 RX ADMIN — PIPERACILLIN SODIUM,TAZOBACTAM SODIUM 3.38 G: 3; .375 INJECTION, POWDER, FOR SOLUTION INTRAVENOUS at 12:16

## 2018-01-01 RX ADMIN — PROPOFOL 25 MCG/KG/MIN: 10 INJECTION, EMULSION INTRAVENOUS at 13:16

## 2018-01-01 RX ADMIN — IPRATROPIUM BROMIDE AND ALBUTEROL SULFATE 3 ML: .5; 3 SOLUTION RESPIRATORY (INHALATION) at 08:23

## 2018-01-01 RX ADMIN — Medication 150 MCG/HR: at 11:16

## 2018-01-01 RX ADMIN — Medication 10 ML: at 20:38

## 2018-01-01 RX ADMIN — IPRATROPIUM BROMIDE AND ALBUTEROL SULFATE 3 ML: .5; 3 SOLUTION RESPIRATORY (INHALATION) at 19:13

## 2018-01-01 RX ADMIN — AMIODARONE HYDROCHLORIDE 1 MG/MIN: 50 INJECTION, SOLUTION INTRAVENOUS at 06:27

## 2018-05-09 PROBLEM — A41.9 SEPSIS (HCC): Status: ACTIVE | Noted: 2018-01-01

## 2018-05-09 PROBLEM — R57.9 SHOCK (HCC): Status: ACTIVE | Noted: 2018-01-01

## 2018-05-09 NOTE — ED NOTES
Spoke with Dr. Stephanie Deleon in regards to this patient. She reports that she noticed a small spot on patient's head CT that was questionable. Asked for bedside nurse to please ensure that hospitalist team read the final head CT report. She suggested an MRI to follow up if patient was appropriate for MRI, but at minimum a follow up head CT. This conversation was relayed to the bedside RN.

## 2018-05-09 NOTE — CONSULTS
CSS   History and Physical    Subjective: Anurag Ward is a 62 y.o. male who was referred for cardiac evaluation from Dr. Fahad Sung for mitral and tricuspid valve endocarditis. The patient was brought to the ED by EMS for altered mental status. In the emergency room he was found to be febrile, tachycardiac, and hypotensive with leukocytosis and lactic acidosis. A TTE was completed with results below. He is unable to provide any medical history dt AMS. Noted from chart review he has a medical history of HTN, hep C, tobacco abuse, and IV drug abuse. The patient lives in assisted living facility in an independent apartment. Cardiac Testing    Cardiac catheterization: none    ECHO: SUMMARY:  Left ventricle: Systolic function was normal. Ejection fraction was  estimated in the range of 55 % to 60 %. There were no regional wall motion  abnormalities. Left atrium: The atrium was moderately dilated. Mitral valve: There was severe flail motion of the posterior leaflet. There was severe regurgitation. There was a probable, medium-sized,  papillary, highly mobile vegetation on the tip of the posterior leaflet,  on the atrial aspect. Tricuspid valve: There was a probable, large, highly mobile vegetation on  the body of the septal leaflet, on the right ventricular aspect. Past Medical History:   Diagnosis Date    Hypertension     Infectious disease     Hepatitis C     Past Surgical History:   Procedure Laterality Date    HX ORTHOPAEDIC      back injury 2013      Social History   Substance Use Topics    Smoking status: Current Every Day Smoker    Smokeless tobacco: Never Used    Alcohol use Yes      Comment: occasional      History reviewed. No pertinent family history. Prior to Admission medications    Medication Sig Start Date End Date Taking?  Authorizing Provider   buPROPion SR [facility] SR) 100 mg SR tablet  11/17/14   Phys MD Chiquis   risperiDONE (RISPERDAL) 4 mg tablet  11/17/14   Phys Other, MD   traZODone (DESYREL) 100 mg tablet  11/17/14   Phys Other, MD   polyethylene glycol (MIRALAX) 17 gram/dose powder Take 17 g by mouth daily. 1 tablespoon with 8 oz of water daily 11/25/14   Frandy Murray NP   diclofenac EC (VOLTAREN) 75 mg EC tablet Take 1 tablet by mouth two (2) times a day. 11/25/14   Frandy Murray NP       No Known Allergies      Review of Systems: unable to obtain dt AMS  Consititutional: Denies fever or chills. Eyes:  Denies use of glasses or vision problems(cataracts). ENT:  Denies hearing or swallowing difficulty. CV: Denies CP, claudication, HTN. Resp: Denies dyspnea, productive cough. : Denies dialysis or kidney problems. GI: Denies ulcers, esophageal strictures, liver problems. M/S: Denies joint or bone problems, or implanted artificial hardware. Skin: Denies varicose veins, edema. Neuro: Denies strokes, or TIAs. Psych: Denies anxiety or depression. Endocrine: Denies thyroid problems or diabetes. Heme/Lymphatic: Denies easy bruising or lymphedema. Objective:     VS:   Visit Vitals    BP 95/73    Pulse 94    Temp 98.7 °F (37.1 °C)    Resp 30    Ht 6' (1.829 m)    Wt 178 lb 9.6 oz (81 kg)    SpO2 98%    BMI 24.22 kg/m2       Physical Exam:    General appearance: drowsy, does not open eyes to voice, moaning  Head: normocephalic, without obvious abnormality; atraumatic  Eyes: conjunctivae/corneas clear; EOM's intact. Nose: nares normal; no drainage. Neck: no carotid bruit and no JVD  Lungs: clear to auscultation bilaterally  Heart: regular rate and rhythm; 3/6 murmur  Abdomen: distended, non-tender; bowel sounds hypoactive  Extremities: moves all extremities; no weakness. Skin: Skin color normal; No varicose veins or 1+ LE edema.   Neurologic: No eye opening, no command following, moving extremities spontaneously, saying \"ouch\"    Labs:   Recent Labs      05/09/18   1251   05/09/18   0320   WBC   --    --   35.2*  35.2*   HGB   --    --   15.4 15.4   HCT   --    --   46.1  46.1   PLT   --    --   84*  84*   NA   --    --   134*   K   --    --   4.0   BUN   --    --   56*   CREA   --    --   2.78*   GLU   --    --   104*   GLUCPOC  157*   < >   --    INR   --    --   1.5*    < > = values in this interval not displayed. Diagnostics:   CXR: AP semiupright view of the chest is obtained . The cardiopericardial silhouette is stable. There is no pleural effusion,  pneumothorax or there are numerous scattered cavitary foci identified throughout  the chest.     IMPRESSION  IMPRESSION:  Numerous scattered cavitary foci. Infectious/septic emboli, neoplastic etiology  should be considered. Carotid doppler: pending    PFTS-FEV1: pending    EKG: Sinus tachycardia   Nonspecific ST abnormality   When compared with ECG of 25-NOV-2014 15:50,   Vent. rate has increased BY  59 BPM   ST now depressed in Anterior leads   Nonspecific T wave abnormality has replaced inverted T waves in Inferior   leads   Assessment:     Principal Problem:    Sepsis ([address] (5/9/2018)    Active Problems:    Shock ([address] (5/9/2018)        Plan:       STS Risk Calculator V2.81 - Discussed by surgeon with patient. -pending workup    Treatment Plan:    1. Sepsis with endocarditis of mitral and tricuspid valves: Tmax 103, WBC 35, lactate improved some now 2.7, Follow BC, ID consult, cont IV fluids, broad spectrum abx, preop workup in process, surgical plans per Dr. Allie Chapa -will need at least 6 weeks of IV antibiotics prior to surgery    2. Encephalopathy: Head CT completed, MRI pending, neurosurgery consulted, monitor    3. Acute renal failure: Cr 2.78, Nephrology following, monitor U/O    4. Thrombocytopenia: Plt 84, monitor    5. Hep C/Elevated liver enzymes: Tbili 5.6, , cont to monitor    6. Hx IVDA: cocaine and heroin abuse, HIV pending    7. Hx HTN: monitor    8. Respiratory insufficiency: CXR with multiple cavitary lung lesions, septic empoli vs. Tumor vs. TB workup pending. Cont airborne precautions, Wean O2 for sats >92%, monitor. Signed By: Abdon Bond NP     May 9, 2018        Saw patient, agree with above  Risk of morbidity and mortality - high  Medical decision making - high complexity    Medical decision making    1. Sepsis with endocarditis of mitral and tricuspid valves: Abx's for now    2. Encephalopathy: monitor    3. Acute renal failure: Nephrology following    4. Thrombocytopenia: Plt 84, monitor    5. Hep C/Elevated liver enzymes: monitor    6. Hx IVDA: cocaine and heroin abuse, HIV pending    7. Hx HTN: monitor    8.  Respiratory insufficiency: monitor

## 2018-05-09 NOTE — CONSULTS
[facility]    Name: Ko Navarrete   : 1960   MRN: [id number]   Date: 2018 11:04 AM     New pt  63 yo male with h/o IVDA, HTN, COPD, Hep C    To ED hypotensive encephalopathic  AMS per family; prodrome resp issues. .  Mumbling historian, non-focal    ED eval-  Febrile 103 with WBC 35k  Hypotensive/ tachycardic--> fluid bolus  Elevated lactate  AKD 56/2.7  UDS + cocaine/opiates  abnml chest imaging most c/w septic embolic - multiple cavitary lesions  Report: Multiple bilateral cavitary lung nodules. Differential diagnostic possibilities  include infection, including septic emboli, and tumor.         Head Ct- ? abnml-->IMPRESSION: Subtle area of hyperattenuation in the right posterior parietal  lobe. MRI would be useful for further evaluation. Alternatively, a follow-up CT  in a few hours could be performed to evaluate for interval change. A small  hemorrhage cannot be excluded and clinical correlation is recommended. Now admitted ICU on broad spectrum abx  For neurosgy and renal eval    PMH- per limited records  COPD/HTN/HepC/ IVDA    SH smoker/ +EtOH/ + coacine/heroin    FH/ROS- unobtainable. .    Prior to Admission Medications   Prescriptions Last Dose Informant Patient Reported? Taking? buPROPion SR (WELLBUTRIN SR) 100 mg SR tablet Unknown at Unknown time  Yes No   diclofenac EC (VOLTAREN) 75 mg EC tablet Unknown at Unknown time  No No   Sig: Take 1 tablet by mouth two (2) times a day. polyethylene glycol (MIRALAX) 17 gram/dose powder Unknown at Unknown time  No No   Sig: Take 17 g by mouth daily.  1 tablespoon with 8 oz of water daily   risperiDONE (RISPERDAL) 4 mg tablet Unknown at Unknown time  Yes No   traZODone (DESYREL) 100 mg tablet Unknown at Unknown time  Yes No      Facility-Administered Medications: None         Vital Signs:    Visit Vitals    /73    Pulse (!) 129    Temp 99.9 °F (37.7 °C)    Resp 22    Ht 6' (1.829 m)    Wt 81 kg (178 lb 9.6 oz)    SpO2 96%    BMI 24.22 kg/m2       O2 Device: Room air       Temp (24hrs), Av.9 °F (38.8 °C), Min:99.9 °F (37.7 °C), Max:103.8 °F (39.9 °C)       Intake/Output:   Last shift:      701 -  1900  In: -   Out: 420 [Urine:420]  Last 3 shifts:      Intake/Output Summary (Last 24 hours) at 18 1104  Last data filed at 18 0935   Gross per 24 hour   Intake                0 ml   Output              420 ml   Net             -420 ml       Physical Exam:    Chonically ill disheveled BM  Tachypneic/tachycardic  Mumbles incoherently  NC/AT  EOMI- muddly sclera. Reactive pupils  Moist MM- visible  dentition ok  Neck no JVD/adenpathy/ R IJ CVL  Chest scattered loose rhonchi  CV tachy S3  Abd NT- no guarding  Ext no edema  Skin dry/ LE statsis cahnges  Moves all 4 follows simple commands    DATA:   Current Facility-Administered Medications   Medication Dose Route Frequency    sodium chloride (NS) flush 5-10 mL  5-10 mL IntraVENous Q8H    sodium chloride (NS) flush 5-10 mL  5-10 mL IntraVENous PRN    acetaminophen (TYLENOL) tablet 975 mg  975 mg Oral ONCE PRN    sodium chloride (NS) flush 5-10 mL  5-10 mL IntraVENous PRN    piperacillin-tazobactam (ZOSYN) 3.375 g in 0.9% sodium chloride (MBP/ADV) 100 mL  3.375 g IntraVENous Q8H    [START ON 2018] levoFLOXacin (LEVAQUIN) 750 mg in D5W IVPB  750 mg IntraVENous Q48H    acetaminophen (TYLENOL) 650 mg suppository        methylPREDNISolone (PF) (SOLU-MEDROL) 125 mg/2 mL injection        levoFLOXacin (LEVAQUIN) 750 mg/150 mL IVPB        piperacillin-tazobactam (ZOSYN) 3.375 gram injection        0.9% sodium chloride infusion        LORazepam (ATIVAN) injection 2 mg  2 mg IntraVENous Q4H PRN    Vancomycin Pharmacy Dosing   Other PRN    [START ON 5/10/2018] Vancomycin Random Level with am lab 5/10   Other ONCE     Current Outpatient Prescriptions   Medication Sig    buPROPion SR (WELLBUTRIN SR) 100 mg SR tablet     risperiDONE (RISPERDAL) 4 mg tablet     traZODone (DESYREL) 100 mg tablet     polyethylene glycol (MIRALAX) 17 gram/dose powder Take 17 g by mouth daily. 1 tablespoon with 8 oz of water daily    diclofenac EC (VOLTAREN) 75 mg EC tablet Take 1 tablet by mouth two (2) times a day. Telemetry: sinus tachy          Labs:  Recent Labs      05/09/18   0320   WBC  35.2*   HGB  15.4   HCT  46.1   PLT  84*     Recent Labs      05/09/18   0320   NA  134*   K  4.0   CL  94*   CO2  21   GLU  104*   BUN  56*   CREA  2.78*   CA  8.4*   ALB  2.2*   SGOT  137*   ALT  41   INR  1.5*     ABG 7.53/24/71 room air    Imaging:  I have personally reviewed the patients radiographs and reports. IMPRESSION:   · Sepsis with clinical picture most c/w endocarditis + septic pulmonary emboli  · Encephalopathy  · AKD with hematuria  · ? Subtle CNS event by initial head CT  · IVDA- cocaine/optiates  · Hep C  · \"COPD\"   PLAN:   · ICU admit  · Broad spectrum abx  · Serial blood cultures  · Check ECHO-   · IVF--> monitor CVP/ UOP  · Renal Eval/ renal sono  · NSGY eval f/u CNS imaging  · ICU protocol care           The pt is critically ill at hi risk further decompensation  See my orders for details    Total critical care time exclusive of procedures 35 minutes.     Abel Hunt MD

## 2018-05-09 NOTE — PROGRESS NOTES
I received message from nurse who received message from radiologist in regards to a change in the reading from CT head. I reviewed the report from revised CT head radiologist report showing:  IMPRESSION: Subtle area of hyperattenuation in the right posterior parietal  lobe. MRI would be useful for further evaluation. Alternatively, a follow-up CT  in a few hours could be performed to evaluate for interval change. A small  hemorrhage cannot be excluded and clinical correlation is recommended. I placed order for stat neurosurgeon consult.

## 2018-05-09 NOTE — PROGRESS NOTES
Pharmacist Note - Vancomycin Dosing    Consult provided for this 62 y.o. male for indication of sepsis, TERRELL. Antibiotic regimen(s): levaquin+Zosyn+vancomycin    Recent Labs      18   0320   WBC  35.2*   CREA  2.78*   BUN  56*     Frequency of BMP:daily  Height: 182.9 cm  Weight: 81 kg  Est CrCl:~ 30-35 ml/min;     Temp (24hrs), Av.9 °F (38.8 °C), Min:99.9 °F (37.7 °C), Max:103.8 °F (39.9 °C)    Cultures:   blood     Goal trough = 15 - 20 mcg/mL    Therapy will be initiated with a loading dose of 2000 mg IV x 1  due to TERRELL will dose by levels

## 2018-05-09 NOTE — ED PROVIDER NOTES
HPI Comments: 62year old male with past medical history significant for Hepatitis C, COPD, HTN, who presents via EMS for evaluation of altered mental status. EMS states that patient's family member called 9-1-1 tonight because patient has been altered from his baseline mentation. Family told EMS that patient has been \"sick\" for the last several days with respiratory complaints. Family was unable to answer any questions about patient's medications or drug allergies. Per EMS, patient is able to ambulate and speak at baseline, but has only been grunting since they arrived to patient's residence. Pt only grunting in the ED and is unable to answer questions. EMS also states that patient was found to be febrile en route with temperature of 101F. History is overall limited due to the condition of the patient and his change in mental status. Social hx: Positive for tobacco use (every day smoker per EMR), Positive for EtOH, Positive for illicit drug abuse (cocaine and heroin per EMR). PCP: None     Full history, physical exam, and ROS unable to be obtained due to: AMS. Note written by Merna Walters. Arcenio Arroyo, as dictated by Griselda Hess, MD 3:17 AM.          Past Medical History:   Diagnosis Date    Hypertension     Infectious disease     Hepatitis C       Past Surgical History:   Procedure Laterality Date    HX ORTHOPAEDIC      back injury 2013         No family history on file. Social History     Social History    Marital status: SINGLE     Spouse name: N/A    Number of children: N/A    Years of education: N/A     Occupational History    Not on file.      Social History Main Topics    Smoking status: Current Every Day Smoker    Smokeless tobacco: Not on file    Alcohol use Yes      Comment: occasional    Drug use: Yes     Special: Cocaine, Heroin      Comment: within past month    Sexual activity: Yes     Birth control/ protection: None     Other Topics Concern    Not on file     Social History Narrative         ALLERGIES: Review of patient's allergies indicates no known allergies. Review of Systems   Unable to perform ROS: Acuity of condition       Vitals:    05/09/18 0315   BP: 96/65   Pulse: (!) 112   Resp: (!) 52   Temp: (!) 103.8 °F (39.9 °C)   SpO2: 92%   Weight: 81 kg (178 lb 9.6 oz)   Height: 6' (1.829 m)            Physical Exam   Nursing note and vitals reviewed. CONSTITUTIONAL: Well-appearing; well-nourished; in moderate distress; lethargic  HEAD: Normocephalic; atraumatic  EYES: PERRL; EOM intact; conjunctiva and sclera are clear bilaterally. ENT: No rhinorrhea; normal pharynx with no tonsillar hypertrophy; mucous membranes pink/dry, no erythema, no exudate. NECK: Supple; non-tender; no cervical lymphadenopathy  CARD: Normal S1, S2; no murmurs, rubs, or gallops. Increased Regular rate and rhythm. RESP: Increased respiratory effort; breath sounds clear and equal bilaterally; no wheezes, rhonchi, or rales. ABD: Normal bowel sounds; non-distended; non-tender; no palpable organomegaly, no masses, no bruits. Back Exam: Normal inspection; no vertebral point tenderness, no CVA tenderness. Normal range of motion. EXT: Normal ROM in all four extremities; non-tender to palpation; no swelling or deformity; distal pulses are normal, 1+ pitting edema bilaterally. SKIN: Warm; dry; ecchymosis bilateral lower extremity scattered skin blisters. NEURO:Alert and confused and  incoherent, JER-XII grossly intact, sensory and motor are non-focal.        MDM  Number of Diagnoses or Management Options  TERRELL (acute kidney injury) ([facility] [address]):    Altered mental status, unspecified altered mental status type:   Pneumonia of both lungs due to infectious organism, unspecified part of lung:   Polysubstance abuse:   Severe sepsis [facility]):   Diagnosis management comments: Assessment: 40-year-old male with febrile illness, tachycardia, tachypnea, and a history of COPD, who is confused, disoriented, and ill-appearing-the patient needs evaluation for sepsis, occult bacteremia. Patient has a history of COPD/ polysubstance abuse that includes heroin and cocaine. Suspect pneumonia/aspiration pneumonitis with possible septic emboli causing altered mental status. Plan: EKG/ chest x-ray/ lab/ IV fluid/ antipyretic/ broad-spectrum antibiotics/ CT scan of the head/ monitor. Cardiovascular support/ consult hospitalist/ Monitor and Reevaluate. Amount and/or Complexity of Data Reviewed  Clinical lab tests: ordered and reviewed  Tests in the radiology section of CPT®: ordered and reviewed  Tests in the medicine section of CPT®: reviewed and ordered  Discussion of test results with the performing providers: yes  Decide to obtain previous medical records or to obtain history from someone other than the patient: yes  Obtain history from someone other than the patient: yes  Review and summarize past medical records: yes  Discuss the patient with other providers: yes  Independent visualization of images, tracings, or specimens: yes    Risk of Complications, Morbidity, and/or Mortality  Presenting problems: high  Diagnostic procedures: high  Management options: high    Critical Care  Total time providing critical care: (Total critical care time spent exclusive of procedures: 45 minutes)    Patient Progress  Patient progress: other (comment) (critical)        ED Course       Central Line  Date/Time: 5/9/2018 5:00 AM  Performed by: Vicky Johnson  Authorized by: Vicky Johnson     Consent:     Consent obtained:  Emergent situation    Risks discussed:  Arterial puncture, incorrect placement, nerve damage, pneumothorax, infection and bleeding    Alternatives discussed:  No treatment  Pre-procedure details:     Hand hygiene: Hand hygiene performed prior to insertion      Sterile barrier technique:  All elements of maximal sterile technique followed      Skin preparation:  2% chlorhexidine    Skin preparation agent: Skin preparation agent completely dried prior to procedure    Anesthesia (see MAR for exact dosages): Anesthesia method:  Local infiltration    Local anesthetic:  Lidocaine 1% WITH epi  Procedure details:     Location:  R subclavian    Patient position:  Trendelenburg    Landmarks identified: yes      Ultrasound guidance: no      Number of attempts:  2    Successful placement: yes    Post-procedure details:     Post-procedure:  Dressing applied and line sutured    Assessment:  Blood return through all ports, free fluid flow, no pneumothorax on x-ray and placement verified by x-ray    Patient tolerance of procedure: Tolerated well, no immediate complications         ED EKG interpretation:  Rhythm: sinus tachycardia; and regular . Rate (approx.): 110; Axis: normal; P wave: normal; QRS interval: normal ; ST/T wave: non-specific changes; in  Lead: Diffusely; Other findings: abnormal ekg. This EKG was interpreted by Karthik Stephen MD,ED Provider. XRAY INTERPRETATION (ED MD)  Chest Xray  No acute process seen. Normal heart size. No bony abnormalities. No infiltrate. Karthik Stephen MD 03:11 AM      XRAY INTERPRETATION (ED MD)  Chest Xray for Central line placement:   Appropriate placement of right CVL; no pneumothorax. Normal heart size. No bony abnormalities. No infiltrate. Karthik Stephen MD 5:10 AM        PROGRESS NOTE:  Pt has been reexamined by Karthik Stephen MD all available results have been reviewed with pt and any available family. The patient remained profoundly septic and decreased urine output and unresponsiveness. Family understands sx, dx, and tx in ED. Care plan has been outlined and questions have been answered. Pt and any available family understands and agrees to need for admission to hospital for further tx not available in ED. Pt is ready for admission.     Written by Karthik Stephen MD,  5:11 AM      CONSULT NOTE:  Karthik Stephen MD spoke with Dr. Enzo Burciaga of the adult hospitalist team. Discussed patient's presentation, history, physical assessment, and available diagnostic results. He will evaluate, write orders and admit the patient to the hospital. 5:11 AM  .     PROGRESS NOTE:  Pt has been reexamined by Alfred Posey MD. Post sepsis reassessment was performed after initial fluid bolus for resuscitation and the patient remained tachypneic, tachycardic, and confused and anuric. Will continue current plan of care with additional fluid resuscitation/ antibiotics/ CT scan of the chest, abdomen, and pelvis/ Hospital consult for evaluation for admission.     Written by Alfred Posey MD,  5:39 AM    .

## 2018-05-09 NOTE — PROGRESS NOTES
1700- Patient unable to go to complete MRI until Airborne precautions are cleared. Discussed with Hillary NP, Orders received for PPD test.      1900- PPD injection administered to R Arm. See eMAR    Bedside and Verbal shift change report given to MARGRET ENGLE [facility] (oncoming nurse) by Armond Mathew (offgoing nurse). Report included the following information SBAR, Kardex, ED Summary, Intake/Output, MAR and Recent Results.

## 2018-05-09 NOTE — PROGRESS NOTES
TRANSFER - IN REPORT:    Verbal report received from Mayuri Pringle RN(name) on Bernardo Laws  being received from ED (unit) for routine progression of care      Report consisted of patients Situation, Background, Assessment and   Recommendations(SBAR). Information from the following report(s) SBAR, Kardex, ED Summary, Intake/Output, MAR and Recent Results was reviewed with the receiving nurse. Opportunity for questions and clarification was provided.

## 2018-05-09 NOTE — PROGRESS NOTES
Hospitalist Progress Note  Yani Dumont NP  Answering service: [phone] from in house phone  Cell: [phone]      Date of Service:  2018  NAME:  Benny Conroy  :  1960  MRN:  [id number]      Admission Summary:   62year old with PMH of COPD, HTN, and Hep C with significant forty plus year drug abuse history. The patient lives in assisted living at [address] [place] [address] in an independent apartment per his brothers. He is   Normally alert and oriented but is not close to his family. He presented with altered mental status and Severe  Sepsis picture. He was admitted and we were consulted to manage these issues    Interval history / Subjective:     Mr. Makeda Medrano keeps repeating \"I really f------ up this time\" He says he hurts all over but is not able to consistently answer questions. I spoke with his two brothers who describe a man they are not close with and who has significant drug problems. UNFORTUNATELY BLOOD CULTURES WERE MISPLACED AND NOW ARE JUST BEING RUN  I called Neurosurgery to discuss CT imaging and I ordered a MRI. Hallie Leroy NP will discuss with attending. Would put on airborne precautions for now due to cavitary lesions on CT imaging  Assessment & Plan:     Severe Sepsis with multisystem organ dysfunction(POA)  Leukocytosis, Fever, Tachycardia, Hypotension, Elevated Lactate  Vanc, Levaquin and Zosyn  Paired blood cultures, UA  Chest CT with multiple cavitary lesions?   IVF  Lactate q 4  Repeat Skin Turgor checked this AM  CT of abdomen ok, skin examined  Echo to rule out vegetation, likely with embolic showering and drug use  HIV test    Acute Renal Failure  Urine lytes and renal ultrasound  Monitor I and O  Nephrology consult    Acute Metabolic Encephalopathy  Head CT with Subtle area of hyperattenuation in the right posterior parietal  Lobe, concern for septic emboli  Brain MRI ordered  Discussed with Neurosurgery    Leukocytosis  WBC 35  Due to above    Thrombocytopenia  Likely reactive but concerned for DIC  Send Coags    Tachycardia  IVF  Could be withdrawal,will need CIWA   EKG ordered    Substance Abuse  Heroine and Cocaine abuse  Family unsure of Tobacco or ETOH use    Hepatitis C  Will add HIV testing  Follow ALT/AST,    Abnormal CT imaging  Cavitary lesions bilaterally, PPD  Airborne precautions  Pulm consult    Code status: Full, discussed with family  DVT prophylaxis: SCDs    Care Plan discussed with: Patient/Family, Nurse and Consultant Neurosurgery, Nephrology, Pulmonology  Disposition: TBD     Hospital Problems  Date Reviewed: 5/9/2018          Codes Class Noted POA    Shock ([facility] [address] ICD-10-CM: R57.9  ICD-9-CM: 785.50  5/9/2018 Unknown        * (Principal)Sepsis ([facility] [address] ICD-10-CM: A41.9  ICD-9-CM: 038.9, 995.91  5/9/2018 Unknown                Review of Systems:   Review of systems not obtained due to patient factors. Vital Signs:    Last 24hrs VS reviewed since prior progress note. Most recent are:  Visit Vitals    /66    Pulse (!) 137    Temp 99.9 °F (37.7 °C)    Resp (!) 46    Ht 6' (1.829 m)    Wt 81 kg (178 lb 9.6 oz)    SpO2 97%    BMI 24.22 kg/m2       No intake or output data in the 24 hours ending 05/09/18 0859     Physical Examination:             Constitutional:  No acute distress, cooperative, pleasant    ENT:  Oral mucous moist, oropharynx benign. Neck supple,    Resp:  CTA bilaterally. No wheezing/rhonchi/rales. No accessory muscle use   CV:  Regular rhythm, Sinus tachy, no murmurs, gallops, rubs    GI:  Soft, non distended, non tender. normoactive bowel sounds, no hepatosplenomegaly     Musculoskeletal:  No edema, warm, 2+ pulses throughout    Neurologic:  Moves all extremities.   Disoriented, unable to assess EOMSI     Psych:  poor insight, confused  Skin:  good turgor, several lesions noted on chest, arms, no jeromy areas of rashes, infection  Hematologic/Lymphatic/Immunlogic:  No jaundice nor lymph node swelling  :  Normal genitalia. Data Review:    Review and/or order of clinical lab test  Review and/or order of tests in the radiology section of [facility]      Labs:     Recent Labs      05/09/18   0320   WBC  35.2*   HGB  15.4   HCT  46.1   PLT  84*     Recent Labs      05/09/18   0320   NA  134*   K  4.0   CL  94*   CO2  21   BUN  56*   CREA  2.78*   GLU  104*   CA  8.4*     Recent Labs      05/09/18   0320   SGOT  137*   ALT  41   AP  111   TBILI  5.6*   TP  8.4*   ALB  2.2*   GLOB  6.2*   LPSE  138     No results for input(s): INR, PTP, APTT in the last 72 hours. No lab exists for component: INREXT   No results for input(s): FE, TIBC, PSAT, FERR in the last 72 hours. No results found for: FOL, RBCF   No results for input(s): PH, PCO2, PO2 in the last 72 hours. No results for input(s): CPK, CKNDX, TROIQ in the last 72 hours.     No lab exists for component: CPKMB  No results found for: CHOL, CHOLX, CHLST, CHOLV, HDL, LDL, LDLC, DLDLP, TGLX, TRIGL, TRIGP, CHHD, CHHDX  Lab Results   Component Value Date/Time    Glucose (POC) 96 05/09/2018 05:45 AM    Glucose (POC) 110 (H) 05/09/2018 05:03 AM     Lab Results   Component Value Date/Time    Color DARK YELLOW 05/09/2018 03:20 AM    Appearance CLOUDY (A) 05/09/2018 03:20 AM    Specific gravity 1.016 05/09/2018 03:20 AM    pH (UA) 5.5 05/09/2018 03:20 AM    Protein 30 (A) 05/09/2018 03:20 AM    Glucose NEGATIVE  05/09/2018 03:20 AM    Ketone NEGATIVE  05/09/2018 03:20 AM    Bilirubin SMALL (A) 11/25/2014 01:52 PM    Urobilinogen 1.0 05/09/2018 03:20 AM    Nitrites NEGATIVE  05/09/2018 03:20 AM    Leukocyte Esterase SMALL (A) 05/09/2018 03:20 AM    Epithelial cells FEW 05/09/2018 03:20 AM    Bacteria NEGATIVE  05/09/2018 03:20 AM    WBC 0-4 05/09/2018 03:20 AM    RBC 5-10 05/09/2018 03:20 AM         Medications Reviewed:     Current Facility-Administered Medications   Medication Dose Route Frequency    sodium chloride 0.9 % bolus infusion 1,000 mL  1,000 mL IntraVENous Q1H    acetaminophen (TYLENOL) tablet 975 mg  975 mg Oral ONCE PRN    sodium chloride (NS) flush 5-10 mL  5-10 mL IntraVENous PRN    piperacillin-tazobactam (ZOSYN) 3.375 g in 0.9% sodium chloride (MBP/ADV) 100 mL  3.375 g IntraVENous Q8H    [START ON 5/11/2018] levoFLOXacin (LEVAQUIN) 750 mg in D5W IVPB  750 mg IntraVENous Q48H    acetaminophen (TYLENOL) 650 mg suppository        methylPREDNISolone (PF) (SOLU-MEDROL) 125 mg/2 mL injection        levoFLOXacin (LEVAQUIN) 750 mg/150 mL IVPB        piperacillin-tazobactam (ZOSYN) 3.375 gram injection        0.9% sodium chloride infusion        LORazepam (ATIVAN) injection 2 mg  2 mg IntraVENous Q4H PRN     Current Outpatient Prescriptions   Medication Sig    buPROPion SR (WELLBUTRIN SR) 100 mg SR tablet     risperiDONE (RISPERDAL) 4 mg tablet     traZODone (DESYREL) 100 mg tablet     polyethylene glycol (MIRALAX) 17 gram/dose powder Take 17 g by mouth daily.  1 tablespoon with 8 oz of water daily    diclofenac EC (VOLTAREN) 75 mg EC tablet Take 1 tablet by mouth two (2) times a day.     ______________________________________________________________________  EXPECTED LENGTH OF STAY: - - -  ACTUAL LENGTH OF STAY:          0                 Fan Arias NP

## 2018-05-09 NOTE — CDMP QUERY
There is noted documentation of Sepsis on this record.    Could this be further clarified as:    =>Severe Sepsis POA with organ dysfunction (Encephalopathy and TERRELL) in the setting of \"clinical picture most c/w endocarditis + septic pulmonary emboli\" requiring fluid resuscitation/ICU monitoring. =>Other Explanation of clinical findings  =>Unable to Determine (no explanation of clinical findings)    The medical record reflects the following clinical findings, treatment, and risk factors:    Risk Factors: likely endocarditis + septic pulmonary emboli    Clinical Indicators:   Encephalopathy, TERRELL  GFR 29; BUN: 56; Creatinine: 2.78  Lactic 5.3  WBC: 35.2  ED Note: communicating only with gutteral sounds, alert but disoriented x 4; pt confused but non implusive    H&P:   GENERAL: The patient is verbally unresponsive. NEUROLOGIC:  GCS 9 (E4 V1 M4) with spontaneous eye opening, no verbal response and withdraws to tactile stimuli, does not follow commands, stares blankly. CXR: Numerous scattered cavitary foci. Infectious/septic emboli, neoplastic etiology should be considered. CT Chest: Multiple bilateral cavitary lung nodules. Differential diagnostic possibilities include infection, including septic emboli, and tumor. Treatment:IV fluid resuscitation and hydration; admit to ICU     Please clarify and document your clinical opinion in the progress notes and discharge summary including the definitive and/or presumptive diagnosis, (suspected or probable), related to the above clinical findings. Please include clinical findings supporting your diagnosis.     Thank you,  Chin Lawler RN  [phone]

## 2018-05-09 NOTE — PROGRESS NOTES
Day #1 of Levaquin  Indication:  sepsis  Current regimen:  750 mg Q 24 hr  Abx regimen: lev+van+Zosyn  Recent Labs      18   0320   WBC  35.2*   CREA  2.78*   BUN  56*     Est CrCl: <50 ml/min;    Temp (24hrs), Av.9 °F (38.8 °C), Min:99.9 °F (37.7 °C), Max:103.8 °F (39.9 °C)    Cultures: none    Plan: Change to levaquin 750 mg Q 48 hr per renal dosing protocol

## 2018-05-09 NOTE — ED NOTES
No blood cultures were collected on patient during night shift. We called lab to see if they had any on hold samples on patient, and they did not. Will collect at this time.

## 2018-05-09 NOTE — ED NOTES
Patient's niece called to speak with patient; Niece will call back;  Niece can be reached at [phone], [facility]

## 2018-05-09 NOTE — CONSULTS
Cardiology Note dictated #[id number]  Impressions:  Severe mitral regurgitation d/t a flail mitral leaflet : Preserved LV systolic function, hemodynamically stable and no pulmonary edema  Large vegetation on the RV side of the tricuspid valve  Cavitary lesions on chest xray c/w septic emboli  Recommendations:  Continue current supportive care  Cardiac surgery is on board  Further recommendations to follow  Thank you for this referral  Quinn Last MD

## 2018-05-09 NOTE — ROUTINE PROCESS
TRANSFER - OUT REPORT:    Verbal report given to Shalonda Ruelas RN(name) on Perez Walsh  being transferred to ICU(unit) for routine progression of care       Report consisted of patients Situation, Background, Assessment and   Recommendations(SBAR). Information from the following report(s) SBAR, Kardex, ED Summary and MAR was reviewed with the receiving nurse. Lines:   Triple Lumen 05/09/18 Right Subclavian (Active)   Central Line Being Utilized Yes 5/9/2018  6:00 AM   Site Assessment Clean, dry, & intact 5/9/2018  6:00 AM   Dressing Status Clean, dry, & intact 5/9/2018  6:00 AM       Peripheral IV 05/09/18 Left Antecubital (Active)   Alcohol Cap Used No 5/9/2018  4:52 AM       Peripheral IV 05/08/18 Left Wrist (Active)   Site Assessment Clean, dry, & intact 5/9/2018  3:01 PM   Phlebitis Assessment 0 5/9/2018  3:01 PM   Infiltration Assessment 0 5/9/2018  3:01 PM   Dressing Status Clean, dry, & intact 5/9/2018  3:01 PM   Dressing Type 4 X 4 5/9/2018  3:01 PM   Hub Color/Line Status Pink 5/9/2018  3:01 PM        Opportunity for questions and clarification was provided.       Patient transported with:   Monitor  Registered Nurse

## 2018-05-09 NOTE — ED NOTES
61 yo male with PMH COPD, HTN and Hepatitis C with history per family of heroin and cocaine use presents to ED for altered mental status, tachypnea, tachycardia and fever, reportedly sick for last few days per family, ambulatory and verbal at baseline. BIBA, sepsis alert initiated upon arrival to room, pt febrile at 103.8 SC, HR 140s sinus tach, RR in 30s, glucose WNL, pt incontinent of bladder and bowel, communicating only with gutteral sounds, alert but disoriented x 4, oriented to room and call bell, cardiac and continuous spO2 monitoring initiated, IV started, blood samples collected and sent to lab for analysis, EKG and chest Xray completed, blood gas collected,  medicated with Tylenol SC for fever, IVF bolus infusing per septic protocol, plan of care reviewed, call bell in reach, side rails up x2, will continue to monitor. 7:38 AM  CT scans completed, repeat lactic acid sent, returned elevated, IV antibiotics infusing as ordered, IVF bolus infusing, stool and blood samples collected and sent, temp decreased to 99.9, cleaned for bowel incontinence, pt confused but non implusive, awaiting ready bed.

## 2018-05-09 NOTE — PROGRESS NOTES
Neurosurgery follow up  Reviewed head CT  There are no indications for neurosurgical intervention based on the recent scan.  Would re eval on request or there are changes on imaging studies  Otherwise will sign off case for now

## 2018-05-09 NOTE — CONSULTS
Consultation Note    NAME: Makeda Goodman   :  1960   MRN:  [id number]     Date/Time:  2018 3:06 PM    I have been asked to see this patient by Dr. Jenaro Purcell  for advice/opinion re: TERRELL. Assessment :    Plan:  TERRELL  Sepsis  Hypotension  Cavitary lung lesions  IVDA  AMS TERRELL likely secondary to infection and possibly volume. Agree with IVF for volume expansion. U/S with a fairly bland urine sediment. Urine sodium low at 12 c/w intravascular depletion. Subjective:   CHIEF COMPLAINT:  Moaning    HISTORY OF PRESENT ILLNESS:     Lily Barnett is a 62 y.o.   male who has a history of IVDA admitted with hypotension and TERRELL. He can give no history. Review of his records shows that he was found unresponsive and sent to the ER. In the ER, he was confused and febrile. His creatinine is up as well. CT shows no hydro. He is on IVF. Past Medical History:   Diagnosis Date    Hypertension     Infectious disease     Hepatitis C      Past Surgical History:   Procedure Laterality Date    HX ORTHOPAEDIC      back injury      Social History   Substance Use Topics    Smoking status: Current Every Day Smoker    Smokeless tobacco: Never Used    Alcohol use Yes      Comment: occasional      History reviewed. No pertinent family history. No Known Allergies   Prior to Admission medications    Medication Sig Start Date End Date Taking? Authorizing Provider   buPROPion SR [facility] SR) 100 mg SR tablet  14   Phys Other, MD   risperiDONE (RISPERDAL) 4 mg tablet  14   Phys Other, MD   traZODone (DESYREL) 100 mg tablet  14   Phys Other, MD   polyethylene glycol (MIRALAX) 17 gram/dose powder Take 17 g by mouth daily. 1 tablespoon with 8 oz of water daily 14   Berta Mendoza NP   diclofenac EC (VOLTAREN) 75 mg EC tablet Take 1 tablet by mouth two (2) times a day.  14   Berta Mendoza NP     REVIEW OF SYSTEMS:     [x]  Unable to obtain reliable ROS due to  [x] mental status  [] sedated   [] intubated   [] Total of 12 systems reviewed as follows:  Constitutional: negative fever, negative chills, negative weight loss  Eyes:   negative visual changes  ENT:   negative sore throat, tongue or lip swelling  Respiratory:  negative cough, negative dyspnea  Cards:  negative for chest pain, palpitations, lower extremity edema  GI:   negative for nausea, vomiting, diarrhea, and abdominal pain  :  negative for frequency, dysuria  Integument:  negative for rash and pruritus  Heme:  negative for easy bruising and gum/nose bleeding  Musculoskel: negative for myalgias,  back pain and muscle weakness  Neuro:  negative for headaches, dizziness, vertigo  Psych:  negative for feelings of anxiety, depression   Travel?: none    Objective:   VITALS:    Visit Vitals    BP 95/73    Pulse 94    Temp 98.7 °F (37.1 °C)    Resp 30    Ht 6' (1.829 m)    Wt 81 kg (178 lb 9.6 oz)    SpO2 98%    BMI 24.22 kg/m2     PHYSICAL EXAM:  Gen:  []  WD []  WN  [] cachectic []  thin []  obese [x]  disheveled             [x]  ill apearing  []   Critical  []   Chronic    []  No acute distress    HEENT:   [x] NC/AT/PERRL    [x] pink conjunctivae      [] pale conjunctivae                  PERRL  [] yes  [] no      [] moist mucosa    [] dry mucosa    hearing intact to voice [] yes  [] No                 NECK:   supple [x] yes  [] no        masses [] yes  [x] No               thyroid  []  non tender  []  tender    RESP:   [] CTA bilaterally/no wheezing/rhonchi/rales/crackles    [x] rhonchi bilaterally - no dullness  [] wheezing   [] rhonchi   [] crackles     use of accessory muscles [] yes [] no    CARD:   [x]  regular rate and rhythm/No murmurs/rubs/gallops    murmur  [] yes ()  [] no      Rubs  [] yes  [] no       Gallops [] yes  [] no    Rate []  regular  []  irregular        carotid bruits  [] Right  []  Left                 LE edema [] yes  [x] no           JVP  []  yes   []  no    ABD:    [x] soft/non distended/non tender/+bowel sounds/no HSM    []  Rigid    tenderness [] yes [] no   Liver enlargement  []  yes []  no                Spleen enlargement  []  yes []  no     distended []  yes [] no     bowel sound  [] hypoactive   [] hyperactive    LYMPH:    Neck []  yes [x]  no       Axillae []  yes [x]  no    SKIN:   Rashes []  yes   [x]  no    Ulcers []  yes   [x]  no               [] tight to palpitation    skin turgor []  good  [] poor  [] decreased               Cyanosis/clubbing []  yes []  no    NEUR:   [] cranial nerves II-XII grossly intact       [] Cranial nerves deficit                 []  facial droop    []  slurred speech   [] aphasic     [] Strength normal     []  weakness  []  LUE  []   RUE/ []  LLE  []   RLE    follows commands  []  yes [x]  no           PSYCH:   insight [x] poor [] good   Alert and Oriented to  [] person  [] place  []  time                    [] depressed [] anxious [x] agitated  [] lethargic [] stuporous  [] sedated     LAB DATA REVIEWED:    Recent Labs      05/09/18   0320   WBC  35.2*  35.2*   HGB  15.4  15.4   HCT  46.1  46.1   PLT  84*  84*     Recent Labs      05/09/18   0320   NA  134*   K  4.0   CL  94*   CO2  21   BUN  56*   CREA  2.78*   GLU  104*   CA  8.4*     Recent Labs      05/09/18   0320   SGOT  137*   ALT  41   AP  111   TBILI  5.6*   ALB  2.2*   GLOB  6.2*   LPSE  138     Recent Labs      05/09/18   1051  05/09/18   0320   INR   --   1.5*   PTP   --   15.4*   APTT  33.3*   --       No results for input(s): FE, TIBC, PSAT, FERR in the last 72 hours. No results for input(s): PH, PCO2, PO2 in the last 72 hours. No results for input(s): CPK, CKMB in the last 72 hours.     No lab exists for component: TROPONINI  Lab Results   Component Value Date/Time    Glucose (POC) 157 (H) 05/09/2018 12:51 PM    Glucose (POC) 96 05/09/2018 05:45 AM    Glucose (POC) 110 (H) 05/09/2018 05:03 AM       Procedures: see electronic medical records for all procedures/Xrays and details which were not copied into this note but were reviewed prior to creation of Plan.    ________________________________________________________________________       ___________________________________________________  Consulting Physician: Tamiko Eden MD

## 2018-05-09 NOTE — H&P
[address]   HISTORY AND PHYSICAL      Angelica Park  MR#: [id number]  : 1960  ACCOUNT #: [de-identified]   ADMIT DATE: 2018    CHIEF COMPLAINT:  The patient does not provide. HISTORY OF PRESENT ILLNESS:  A 75-year-old  male with past medical history of hypertension, hepatitis C, tobacco abuse, IV drug abuse, presented to the emergency department from home via EMS for unresponsiveness. The patient is a non-historian, currently nonverbal.  As such, limited history has been obtained from review of ED medical reports. Per these collective reports the patient's family called EMS tonight as the patient had been found with decreased responsiveness. The patient subsequently was transferred to the ED for further evaluation. No further details are available for review. On arrival to the emergency department, initial recorded vital signs were temperature of 103.8 degrees Fahrenheit, blood pressure 96/65, heart rate 112, respiratory rate of 52, O2 saturation 92% on room air. Workup including labs showed elevated WBC of 35,200; neutrophils 84%; bands 5%. Urinalysis positive for large amount of blood, bilirubin, 30 protein. Anion gap of 19, BUN of 56, creatinine of 2.78 (compared to last creatinine of 0.88 on 2014) with a GFR of 29, total bilirubin of 5.6, AST of 137, in terms of abnormal labs. Urine drug screen was positive for cocaine and opiates. Arterial blood gas was consistent with a primary uncompensated respiratory alkalosis, albeit with a bicarbonate of 20.1 and a base deficit -3. Sepsis protocol was called and initiated. Patient was started on 0.9% normal saline 1000 mL IV fluid boluses x 3, given Tylenol 975 mg rectal suppository, started on IV antibiotics with vancomycin 2000 mg IV, Zosyn 3.375 g IV and levofloxacin 750 mg IV. Chest x-ray portable showed clear lungs.   Central venous line had been inserted per the ED with tip noted in the SVC. Initially localized source of infection was unknown and a CT chest, abdomen and pelvis were pending. Patient is now seen for admission to the hospitalist service for continued evaluation and treatment. PAST MEDICAL HISTORY:  1. Cocaine abuse. 2.  Heroin abuse. 3.  Tobacco abuse. 4.  Hypertension. 5.  Hepatitis C. PAST SURGICAL HISTORY:  Status post back surgery, unspecified, 2013. MEDICATIONS:  Complete medication list reviewed and noted in chart records. buPROPion SR [facility] SR) 100 mg SR tablet  Unknown  11/17/14  --  Viv Sim, MD              diclofenac EC (VOLTAREN) 75 mg EC tablet  Unknown  11/25/14  -- Chitra Torrez NP      Take 1 tablet by mouth two (2) times a day.      polyethylene glycol (MIRALAX) 17 gram/dose powder  Unknown  11/25/14  -- Chitra Torrez NP      Take 17 g by mouth daily. 1 tablespoon with 8 oz of water daily      risperiDONE (RISPERDAL) 4 mg tablet  Unknown  11/17/14  --  Viv Sim MD            traZODone (DESYREL) 100 mg tablet  Unknown  11/17/14  --  Viv Sim, MD                   ALLERGIES:  NO KNOWN DRUG ALLERGIES. SOCIAL HISTORY:  Positive smoking cigarettes, positive cocaine and heroin, occasional alcohol per prior chart records. FAMILY HISTORY:  Unknown. Unable to obtain from the patient. He does not answer questions. REVIEW OF SYSTEMS:  Unable to obtain review of systems as patient is currently nonverbal.    PHYSICAL EXAMINATION:  VITAL SIGNS:  Temperature max was 103.8 degrees Fahrenheit, blood pressure 102/59, heart rate 150, respiratory rate of 30, O2 saturations 95% on room air, recorded weight 170 pounds (81 kg), height is 6 foot 0 inches tall. GENERAL:  Ill-appearing male currently hyperventilating. The patient is verbally unresponsive. NEUROLOGIC:  GCS 9 (E4 V1 M4) with spontaneous eye opening, no verbal response and withdraws to tactile stimuli, does not follow commands, stares blankly. No facial droop. Generalized weakness. HEENT:  Normocephalic, atraumatic. Pupils are 2 mm reactive. Sclerae is anicteric. Conjunctivae clear. Nares are patent. Oropharynx is difficult to evaluate as the patient's mouth is clenched, uncooperative. NECK:  Trachea is midline. No JVD, carotid bruits or thyromegaly. Right internal jugular central venous catheter in place. LYMPHATICS:  Negative cervical and supraclavicular adenopathy. RESPIRATORY:  Lungs with scattered rhonchi bilateral.  CARDIOVASCULAR:  Heart tachycardic, regular rhythm. No murmurs, rubs or gallops. GASTROINTESTINAL:  Abdomen soft, nontender. Hypoactive bowel sounds. Nondistended. No rebound, guarding, rigidity. No auscultated abdominal bruits. No pulsatile mass. BACK:  CVA tenderness. No step-off deformity. MUSCULOSKELETAL:  No acute palpable deformity. Negative calf tenderness. VASCULAR:  2+ radial, 1+ dorsalis pedis pulses without cyanosis. Positive for clubbing, nonpitting edema bilateral lower extremities, bilateral legs and feet. SKIN:  Warm and dry. Chronic venous stasis discoloration of distal legs and feet. LABORATORY DATA:  Reviewed as follows:  Sodium of 134, potassium 4.0, chloride 94, CO2 of 21, BUN 56, creatinine 2.78, glucose 104, anion gap of 19, calcium is 8.4, GFR 29, total bilirubin is 5.6, total protein 8.4, albumin is 2.2, ALT 41, AST of 137, alkaline phosphatase is 111, lipase 138. CK total 116. WBC 35.2, hemoglobin 15.4, hematocrit 46.1, platelets 84, neutrophils 87%, bands 5%. Urinalysis: leukocyte esterase small, nitrites negative, urobilinogen is 1.0, bilirubin is positive, blood large, ketones negative, glucose negative, protein 30, pH of 5.5, specific gravity 1.016, WBCs 0-4, RBCs 5-10, bacteria negative. Urine drug screen positive for cocaine and opiates. Influenza A and B antigen negative. CT of the head without contrast, this report is unavailable for review.   CT of the chest without contrast showed multiple bilateral cavitary lung nodules. Differential diagnoses including infection, including septic emboli an tumor. Chest x-ray portable is also noted. ASSESSMENT AND PLAN:  1. Sepsis. Admit patient to ICU. Clinically, the patient status is critical.  He will be on IV fluids for hydration. The patient already has been started on broad spectrum IV antibiotics with Zosyn, Levaquin and vancomycin. Continue on the same. Check blood, urine cultures. Consider for lumbar puncture now. There is evidence of noted cavitary lung lesions and possible septic emboli which provide potential source for noted sepsis and infection. Check lactic acid level. Monitor closely. 2.  Hypotension. Continue with IV fluid resuscitation and hydration. Consider Levophed if blood pressure should remain low. 3.  Multiple cavitary lung lesions. Consider for infectious septic emboli and tumor. Check also for TB, order QuantiFERON TB Gold test.  Consider for additional testing hepatitis. Order a hepatitis panel. Also, consider for HIV testing. Concern with patient's prior IV drug abuse. I also ordered 2D echocardiogram to evaluate for any cardiac vegetation. Will order a second set of blood cultures to evaluate for possible endocarditis and leukocytosis. Plan as noted above. 4.  Microscopic hematuria which now has evidence of gross hematuria based on blood clots and sediment noted in Souza catheter. 5.  Cocaine/heroin drug abuse. Monitor for signs of withdrawal.  6.  Metabolic acidosis. Continue with IV fluid hydration resuscitation. Will order lactic acid levels. 7.  Sinus tachycardia. Likely secondary to recent fever and underlying sepsis. Continue with IV fluid resuscitation and treat underlying factors. Continue telemetry monitoring, 12 lead EKG. 8.  Hypertension. Blood pressure is now low with hypotension. Monitor closely. 9.  Hyperbilirubinemia. Check direct bilirubin and total bilirubin levels. Repeat comprehensive metabolic panel. 10.  Altered mental status. This is likely encephalopathy. Check additional testing,  acetaminophen, salicylate, TSH, ammonia level. Monitor for other signs of stroke with possible septic emboli noted on CT reports. 11.  Venous thromboembolism prophylaxis:  SCDs to lower extremities. MD OPAL Forte/TANNA  D: 05/09/2018 06:38     T: 05/09/2018 08:43  JOB #: [id number]

## 2018-05-09 NOTE — ED NOTES
The documentation for this period is being entered following the guidelines as defined in the [facility] policy by Gabriela Ramos RN.

## 2018-05-10 NOTE — PROGRESS NOTES
[facility]    Name: Leana Arias   : 1960   MRN: [id number]   Date: 5/10/2018 11:04 AM     5/10  Seen and examined earlier today Awakens but mumbling. Blood cultures growing staph. Echo with TV and MV vegetations. 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 Can DC airborne precautions from pulmonary standpoint, Plats low, could be zosyn. Will consult ID for long term antibiotic management. Converted to a fib this am and troponin is elevated      New pt  63 yo male with h/o IVDA, HTN, COPD, Hep C    To ED hypotensive encephalopathic  AMS per family; prodrome resp issues. .  Mumbling historian, non-focal    ED eval-  Febrile 103 with WBC 35k  Hypotensive/ tachycardic--> fluid bolus  Elevated lactate  AKD 56/2.7  UDS + cocaine/opiates  abnml chest imaging most c/w septic embolic - multiple cavitary lesions  Report: Multiple bilateral cavitary lung nodules. Differential diagnostic possibilities  include infection, including septic emboli, and tumor.         Head Ct- ? abnml-->IMPRESSION: Subtle area of hyperattenuation in the right posterior parietal  lobe. MRI would be useful for further evaluation. Alternatively, a follow-up CT  in a few hours could be performed to evaluate for interval change. A small  hemorrhage cannot be excluded and clinical correlation is recommended. Now admitted ICU on broad spectrum abx  For neurosgy and renal eval    PMH- per limited records  COPD/HTN/HepC/ IVDA    SH smoker/ +EtOH/ + coacine/heroin    FH/ROS- unobtainable. .    Prior to Admission Medications   Prescriptions Last Dose Informant Patient Reported? Taking? buPROPion SR (WELLBUTRIN SR) 100 mg SR tablet Unknown at Unknown time  Yes No   diclofenac EC (VOLTAREN) 75 mg EC tablet Unknown at Unknown time  No No   Sig: Take 1 tablet by mouth two (2) times a day.    polyethylene glycol (MIRALAX) 17 gram/dose powder Unknown at Unknown time  No No   Sig: Take 17 g by mouth daily. 1 tablespoon with 8 oz of water daily   risperiDONE (RISPERDAL) 4 mg tablet Unknown at Unknown time  Yes No   traZODone (DESYREL) 100 mg tablet Unknown at Unknown time  Yes No      Facility-Administered Medications: None         Vital Signs:    Visit Vitals    /85    Pulse 62    Temp 96.9 °F (36.1 °C)    Resp 24    Ht 6' (1.829 m)    Wt 85.5 kg (188 lb 9.6 oz)    SpO2 93%    BMI 25.58 kg/m2       O2 Device: Nasal cannula   O2 Flow Rate (L/min): 2 l/min   Temp (24hrs), Av.6 °F (37 °C), Min:96.9 °F (36.1 °C), Max:100 °F (37.8 °C)       Intake/Output:   Last shift:      05/10 0701 - 05/10 1900  In: 5399 [I.V.:2677]  Out: 415 [Urine:415]  Last 3 shifts: 1901 - 05/10 0700  In: 1215 [I.V.:1215]  Out: 2825 [Urine:2825]    Intake/Output Summary (Last 24 hours) at 05/10/18 1239  Last data filed at 05/10/18 1200   Gross per 24 hour   Intake             3892 ml   Output             2820 ml   Net             1072 ml       Physical Exam:    Chonically ill disheveled BM  Tachypneic/tachycardic  Mumbles incoherently  NC/AT  EOMI- muddly sclera. Reactive pupils  Moist MM- visible  dentition ok  Neck no JVD/adenpathy/ R IJ CVL  Chest scattered loose rhonchi  CV tachy S3  Abd NT- no guarding  Ext no edema  Skin dry/ LE statsis cahnges  Moves all 4 follows simple commands    DATA:   Current Facility-Administered Medications   Medication Dose Route Frequency    metoprolol (LOPRESSOR) injection 2.5 mg  2.5 mg IntraVENous Q6H    lactated Ringers infusion  75 mL/hr IntraVENous CONTINUOUS    acetaminophen (TYLENOL) suppository 650 mg  650 mg Rectal Q6H PRN    vancomycin (VANCOCIN) 1250 mg in  ml infusion  1,250 mg IntraVENous Q16H    sodium chloride (NS) flush 5-10 mL  5-10 mL IntraVENous Q8H    sodium chloride (NS) flush 5-10 mL  5-10 mL IntraVENous PRN    sodium chloride (NS) flush 5-10 mL  5-10 mL IntraVENous PRN    piperacillin-tazobactam (ZOSYN) 3.375 g in 0.9% sodium chloride (MBP/ADV) 100 mL  3.375 g IntraVENous Q8H    [START ON 5/11/2018] levoFLOXacin (LEVAQUIN) 750 mg in D5W IVPB  750 mg IntraVENous Q48H    LORazepam (ATIVAN) injection 2 mg  2 mg IntraVENous Q4H PRN    Vancomycin Pharmacy Dosing   Other PRN    tuberculin injection 5 Units  5 Units IntraDERMal ONCE    sodium chloride (NS) flush 20 mL  20 mL InterCATHeter PRN    sodium chloride (NS) flush 10 mL  10 mL InterCATHeter Q24H    sodium chloride (NS) flush 10 mL  10 mL InterCATHeter PRN    sodium chloride (NS) flush 10 mL  10 mL InterCATHeter Q8H    alteplase (CATHFLO) 1 mg in sterile water (preservative free) 1 mL injection  1 mg InterCATHeter PRN       Telemetry: sinus tachy          Labs:  Recent Labs      05/10/18   0432  05/09/18   0320   WBC  32.0*  35.2*  35.2*   HGB  11.8*  15.4  15.4   HCT  36.0*  46.1  46.1   PLT  32*  84*  84*     Recent Labs      05/10/18   0727  05/10/18   0432  05/09/18   0320   NA   --   147*  134*   K   --   3.6  4.0   CL   --   117*  94*   CO2   --   21  21   GLU   --   144*  104*   BUN   --   56*  56*   CREA   --   1.65*  2.78*   CA   --   7.0*  8.4*   MG   --   3.3*   --    PHOS   --   3.1   --    ALB   --   1.5*  2.2*   SGOT   --   260*  137*   ALT   --   53  41   INR  1.8*  1.8*  1.5*     ABG 7.53/24/71 room air    Imaging:  I have personally reviewed the patients radiographs and reports. IMPRESSION:   · MODS with Sepsis with clinical picture most c/w endocarditis (TV and MV vegetations) + septic pulmonary emboli: staph bacteremia  · Encephalopathy  · AKD with hematuria  · ?  Subtle CNS event by initial head CT  · IVDA- cocaine/optiates  · Hep C  · \"COPD\"  · Thrombocytopenia- sepsis/zosyn   PLAN:   · O2  · ID consult  · CTS, cardiology, nephrology following  · Broad spectrum abx  · IVF--> monitor CVP/ UOP  · IVF adjusted  · MRI of head when stable  · Surveillance cultures  · If encephalopathy not better tomorrow, will consider tube feeds  · SCDs  · May discontinue airborne precautions from pulm stabpoint  · ICU protocol care           The pt is critically ill at hi risk further decompensation  See my orders for details    Total critical care time exclusive of procedures 35 minutes.     Papo Fowler MD

## 2018-05-10 NOTE — PROGRESS NOTES
5/10/2018     Admit Date: 5/9/2018    Admit Diagnosis: Sepsis ([facility] [address]  Shock ([facility] [address]    Principal Problem:    Sepsis ([facility] [address] (5/9/2018)    Active Problems:    Shock ([facility] [address] (5/9/2018)        Assessment/Plan:  1. Has gram positive SBE with severe mitral and tricuspid regurgitation. 2. He is more alert but remains incoherent  3. Significantly elevated troponin so NSTEMI is strong possibility. 4. PAF resolved transiently but he is now back in AF  5. Hemodynamically he remains relatively stable  Plan:  Cannot anticoagulate until we can be sure he is not bleeding  Continue IV BB for presumed CAD and AF rate control  Echo tomorrow to assess LV function and regional wall motion  Serial chest radiographs to assess for pulmonary edema       Subjective:  Awake but unintelligible speech         Emma Forbesush does not respond coherently. Objective:     Visit Vitals    /90    Pulse 69    Temp 96.8 °F (36 °C)    Resp 22    Ht 6' (1.829 m)    Wt 85.5 kg (188 lb 9.6 oz)    SpO2 100%    BMI 25.58 kg/m2        Physical Exam:  Neck: supple, symmetrical, trachea midline, no adenopathy, thyroid: not enlarged, symmetric, no tenderness/mass/nodules, no carotid bruit and no JVD  Heart: irregularly irregular rhythm, S1, S2 normal  Lungs: rales R anterior, L anterior, rhonchi R anterior, L anterior, diminished breath sounds R anterior, L anterior  Abdomen: soft, non-tender.  Bowel sounds normal. No masses,  no organomegaly  Extremities: edema moderate lower extremity edema  Pulses: 2+ and symmetric  Neurologic: Grossly normal      Labs:    Recent Labs      05/10/18   0605   CPK  303   CKMB  31.6*   TROIQ  19.30*     Recent Labs      05/10/18   0432   NA  147*   K  3.6   CL  117*   BUN  56*   CREA  1.65*   GLU  144*   PHOS  3.1   CA  7.0*     Recent Labs      05/10/18   0432   WBC  32.0*   HGB  11.8*   HCT  36.0*   PLT  32*     Recent Labs      05/10/18   0432   CHOL  <50   LDLC  Unable to calculate LDL or VLDL due to low cholesterol.        Telemetry: AFIB     Data Review: current meds, labs,recent radiology, intake/output/weight and problem list reviewed

## 2018-05-10 NOTE — PROGRESS NOTES
NAME: Emma Hernandez        :  1960        MRN:  [id number]        Assessment :    Plan:  --TERRELL  Sepsis  Hypotension  Cavitary lung lesions  IVDA  AMS  Endocarditis --Creatinine a little better. Sodium up. Change to hypotonic fluids. Subjective:     Chief Complaint:  AMS. Review of Systems:    Symptom Y/N Comments  Symptom Y/N Comments   Fever/Chills    Chest Pain     Poor Appetite    Edema     Cough    Abdominal Pain     Sputum    Joint Pain     SOB/BENTLEY    Pruritis/Rash     Nausea/vomit    Tolerating PT/OT     Diarrhea    Tolerating Diet     Constipation    Other       Could not obtain due to:      Objective:     VITALS:   Last 24hrs VS reviewed since prior progress note. Most recent are:  Visit Vitals    /82    Pulse 66    Temp 96.9 °F (36.1 °C)    Resp (!) 33    Ht 6' (1.829 m)    Wt 85.5 kg (188 lb 9.6 oz)    SpO2 95%    BMI 25.58 kg/m2       Intake/Output Summary (Last 24 hours) at 05/10/18 1405  Last data filed at 05/10/18 1400   Gross per 24 hour   Intake           4188.5 ml   Output             2895 ml   Net           1293.5 ml      Telemetry Reviewed:     PHYSICAL EXAM:  General: Agitated  Few rhonchi  No edema      Lab Data Reviewed: (see below)    Medications Reviewed: (see below)    PMH/SH reviewed - no change compared to H&P  ________________________________________________________________________  Care Plan discussed with:  Patient     Family      RN     Care Manager                    Consultant:          Comments   >50% of visit spent in counseling and coordination of care       ________________________________________________________________________  Mary Lorenzo MD     Procedures: see electronic medical records for all procedures/Xrays and details which  were not copied into this note but were reviewed prior to creation of Plan.       LABS:  Recent Labs      05/10/18   6413 05/09/18   0320   WBC  32.0*  35.2*  35.2*   HGB  11.8*  15.4  15.4   HCT  36.0*  46.1  46.1   PLT  32*  84*  84*     Recent Labs      05/10/18   0432  05/09/18   0320   NA  147*  134*   K  3.6  4.0   CL  117*  94*   CO2  21  21   BUN  56*  56*   CREA  1.65*  2.78*   GLU  144*  104*   CA  7.0*  8.4*   MG  3.3*   --    PHOS  3.1   --      Recent Labs      05/10/18   0432  05/09/18   0320   SGOT  260*  137*   AP  68  111   TP  6.2*  8.4*   ALB  1.5*  2.2*   GLOB  4.7*  6.2*   LPSE   --   138     Recent Labs      05/10/18   0727  05/10/18   0432  05/09/18   1051  05/09/18   0320   INR  1.8*  1.8*   --   1.5*   PTP  17.9*  18.3*   --   15.4*   APTT  31.6   --   33.3*   --       No results for input(s): FE, TIBC, PSAT, FERR in the last 72 hours. No results found for: FOL, RBCF   No results for input(s): PH, PCO2, PO2 in the last 72 hours.   Recent Labs      05/10/18   0605   CPK  303   CKMB  31.6*     No components found for: [place]  Lab Results   Component Value Date/Time    Color DARK YELLOW 05/09/2018 03:20 AM    Appearance CLOUDY (A) 05/09/2018 03:20 AM    Specific gravity 1.016 05/09/2018 03:20 AM    pH (UA) 5.5 05/09/2018 03:20 AM    Protein 30 (A) 05/09/2018 03:20 AM    Glucose NEGATIVE  05/09/2018 03:20 AM    Ketone NEGATIVE  05/09/2018 03:20 AM    Bilirubin SMALL (A) 11/25/2014 01:52 PM    Urobilinogen 1.0 05/09/2018 03:20 AM    Nitrites NEGATIVE  05/09/2018 03:20 AM    Leukocyte Esterase SMALL (A) 05/09/2018 03:20 AM    Epithelial cells FEW 05/09/2018 03:20 AM    Bacteria NEGATIVE  05/09/2018 03:20 AM    WBC 0-4 05/09/2018 03:20 AM    RBC 5-10 05/09/2018 03:20 AM       MEDICATIONS:  Current Facility-Administered Medications   Medication Dose Route Frequency    metoprolol (LOPRESSOR) injection 2.5 mg  2.5 mg IntraVENous Q6H    lactated Ringers infusion  75 mL/hr IntraVENous CONTINUOUS    acetaminophen (TYLENOL) suppository 650 mg  650 mg Rectal Q6H PRN    vancomycin (VANCOCIN) 1250 mg in  ml infusion  1,250 mg IntraVENous Q16H    sodium chloride (NS) flush 5-10 mL  5-10 mL IntraVENous Q8H    sodium chloride (NS) flush 5-10 mL  5-10 mL IntraVENous PRN    sodium chloride (NS) flush 5-10 mL  5-10 mL IntraVENous PRN    piperacillin-tazobactam (ZOSYN) 3.375 g in 0.9% sodium chloride (MBP/ADV) 100 mL  3.375 g IntraVENous Q8H    [START ON 5/11/2018] levoFLOXacin (LEVAQUIN) 750 mg in D5W IVPB  750 mg IntraVENous Q48H    LORazepam (ATIVAN) injection 2 mg  2 mg IntraVENous Q4H PRN    Vancomycin Pharmacy Dosing   Other PRN    tuberculin injection 5 Units  5 Units IntraDERMal ONCE    sodium chloride (NS) flush 20 mL  20 mL InterCATHeter PRN    sodium chloride (NS) flush 10 mL  10 mL InterCATHeter Q24H    sodium chloride (NS) flush 10 mL  10 mL InterCATHeter PRN    sodium chloride (NS) flush 10 mL  10 mL InterCATHeter Q8H    alteplase (CATHFLO) 1 mg in sterile water (preservative free) 1 mL injection  1 mg InterCATHeter PRN

## 2018-05-10 NOTE — PROGRESS NOTES
Patient seen and examined    IMPRESSION    1. 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 Multiple pulmonary cavitary lesions likely septic emboli     3. Severe MR     4. Renal insufficiency     5. Thrombocytopenia     6. Hyperbilirubinemia     7. Substance abuse - cocaine, heroin     PLAN    1. Discontinue levaquin    2. Ff up cultures. Repeat blood cultures on Saturday     3. Will need 6 weeks of IV therapy, all of which needs to be as an inpatient     4. Don't think we need to necessarily get 3 sputum AFBs especially if the quantiferon gold is negative. The cavitary lesions can be explained by endocarditis.

## 2018-05-10 NOTE — PROGRESS NOTES
Day #2 of Vancomycin - Dosing Update  Indication:  Sepsis secondary to endocarditis (mitral & tricuspid valve) with probable Staph aureus bacteremia and possible septic pulmonary emboli  Current regimen:  Based on levels, last dose: 1.25 gm on 5/10 @ 0617  Abx regimen:  Levaquin + Zosyn + Vancomycin  ID Following ?: YES (consulted, not yet seen)  Concomitant nephrotoxic drugs (requires more frequent monitoring): None  Frequency of BMP?: x 1 tomorrow    Recent Labs      05/10/18   0432  18   0320   WBC  32.0*  35.2*  35.2*   CREA  1.65*  2.78*   BUN  56*  56*     Est CrCl: ~50-55 ml/min; UO: >1 ml/kg/hr  Temp (24hrs), Av.8 °F (37.1 °C), Min:97.6 °F (36.4 °C), Max:100 °F (37.8 °C)    Cultures:    Blood: probable Staph aureus in 2/2 bottles, pending  5/10 Blood: pending    Goal trough = 15 - 20 mcg/mL    Recent trough history (date/time/level/dose/action taken):  5/10 @ 0432 = 7.9 mcg/ml (drawn ~23.5 hrs post-loading dose) - 1.25 gm IV x 1 dose ordered    Plan: Given the dramatic improvement in the patient's Scr since yesterday (2.78 to 1.65), a maintenance dose of 1250 mg IV Q 16 hr will be ordered to start this evening (a one-time dose of 1250 mg IV was given this morning). Pharmacy will continue to monitor patient daily and will make dosage adjustments based upon changing renal function.

## 2018-05-10 NOTE — CONSULTS
[address]    Jaja Ramirez  MR#: [id number]  : 1960  ACCOUNT #: [de-identified]   DATE OF SERVICE: 05/10/2018    REQUESTING PHYSICIAN:  Dr. Daniela Fleming. REASON FOR CONSULTATION:  Endocarditis. HISTORY OF PRESENT ILLNESS:  The patient is a 66-year-old man whose medical history is significant for heroin and cocaine abuse. He also has a history of hepatitis C as well as hypertension. He is a very poor historian. He mumbles and cannot answer questions appropriately. He does not know why he came to the hospital, so the history is taken from the admitting note. He was brought here because he had decreased responsiveness. In the ER, he was found to be tachycardic with an elevated white count of about 35,000. He was also found to be in acute renal failure. His urine drug screen was positive for cocaine and opiates. His total bilirubin was about 5.6. He was admitted to the ICU, where he was placed on vancomycin, Zosyn and Levaquin. A CT scan of the chest revealed multiple cavitary lesions, so he was placed on airborne precaution. His blood cultures have turned positive for Staph aureus and his echocardiogram revealed a vegetation on the tricuspid and mitral valves. We are now being asked to see him in consult. ALLERGIES:  NO KNOWN DRUG ALLERGIES. CURRENT MEDICATIONS:  1. Cordarone. 2.  Lopressor. 3.  Zosyn. 4.  Levaquin. 5.  Vancomycin. REVIEW OF SYSTEMS:  He cannot answer questions appropriately. He did mention that he may have some lower back pain. PAST MEDICAL HISTORY:  1. Cocaine abuse. 2.  Heroin abuse. 3.  Tobacco abuse. 4.  Hypertension. 5.  Hepatitis C. PAST SURGICAL HISTORY:  Back surgery in . SOCIAL HISTORY:  He smokes tobacco.  He does engage in illegal drug use. FAMILY HISTORY:  He says that his parents are dead, but he does not know from what disease.     PHYSICAL EXAMINATION:  GENERAL:  He is cachectic. VITAL SIGNS:  Temperature is 96.8, pulse 69, blood pressure 120/90, satting at 100% on 2 liters. HEENT:  He has pink conjunctivae, anicteric sclerae. No JVD, no cervical lymphadenopathy. External ears are normal.  LUNGS:  Reveal bilateral crackles. HEART:  He has a murmur. ABDOMEN:  Soft, nontender. No guarding or rebound. GENITOURINARY:  No flank tenderness. MUSCULOSKELETAL:  Knees, wrists, ankles, elbows, shoulders are not warm and are nontender. I could flex both hip joints without pain. INTEGUMENT:  I did not notice any rash. PSYCHIATRIC:  Cannot answer questions appropriately. NEUROLOGIC:  He does not really obey my commands. LABORATORY DATA:  White blood cell count is 32,000, hemoglobin 11.8, platelet 32. Urinalysis showed 0-4 white blood cells. Creatinine 1.65, , ALT 53, alkaline phosphatase 68, total bilirubin 4.1. Blood cultures growing 2/2 Staph aureus. HIV is negative. The CT of the lungs shows multiple cavitary nodules. The CT of the abdomen shows no acute findings. IMPRESSION:   1. 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 2.  Multiple pulmonary cavitary lesions, likely from septic emboli. 3.  Severe mitral regurgitation. 4.  Renal insufficiency. 5.  Thrombocytopenia. 6.  Hyperbilirubinemia. 7.  Substance abuse due to cocaine and heroin. PLAN:  1.  I would discontinue Levaquin. 2.  We will follow up culture. We should repeat blood cultures on Saturday. 3.  He will need 6 weeks of IV therapy, all of which needs to be as an inpatient. 4.  I do not think we need to necessarily get 3 sputum AFBs, especially if the QuantiFERON Gold is negative. The cavitary lesions can be explained by endocarditis. 5.  I will get hepatitis B serology. 6.  I will get an ultrasound of the right upper quadrant. Thank you for the consult.       MD MADHAV Herman / PN  D: 05/10/2018 18:10     T: 05/10/2018 18:45  JOB #: [id number]

## 2018-05-10 NOTE — CONSULTS
Neuro consult completed. Pt with endocarditis, was in afib earlier today, now NSR. CT head with possible bleed. Question is if he can be anticoagulated. Exam is limited due to mental status changes, but non-focal.  He is at risk for septic emboli and with anticoagulation would be at risk for hemorrhage. Would prefer MRI, but I am told he cannot undergo MRI while on respiratory isolation. Will order a repeat head CT to reassess questionable area of hemorrhage on initial head CT.

## 2018-05-10 NOTE — PROGRESS NOTES
Hospitalist Progress Note  Salomón Wray NP  Answering service: [phone] from in house phone  Cell: [phone]      Date of Service:  5/10/2018  NAME:  Fabiola Howard  :  1960  MRN:  [id number]      Admission Summary:   62year old with PMH of COPD, HTN, and Hep C with significant forty plus year drug abuse history. The patient lives in assisted living at [address] [place] [address] in an independent apartment per his brothers. He is   Normally alert and oriented but is not close to his family. He presented with altered mental status and Severe  Sepsis picture.  He was admitted and we were consulted to manage these issues    Interval history / Subjective:     Mr. Lozano Notice is still mumbling but difficult to have a conversation with him     Assessment & Plan:     Severe Sepsis with multisystem organ dysfunction in the setting of Endocarditis(POA)  Leukocytosis, Fever, Tachycardia, Hypotension, Elevated Lactate  Vanc, Levaquin and Zosyn  Paired blood cultures with prelim probable staph, UA  Chest CT with multiple cavitary lesions?or septic emboli, more likely  IVF at 150 / hour, decrease to 100  Lactate trended down 2.1  CT of abdomen ok, skin examined  Echo with EF 55-60%, NRWMA, MV vegetation, TV vegetation  HIV test negative    Acute Renal Failure  Urine lytes and renal ultrasound  Monitor I and O, weights  Nephrology following    Acute Metabolic Encephalopathy  Head CT with Subtle area of hyperattenuation in the right posterior parietal  Lobe, concern for septic emboli  Brain MRI ordered but has to be cleared from a TB standpoint, PPD pending  Discussed with Neurosurgery    Leukocytosis  WBC 35 to 32  Due to above    Thrombocytopenia  Likely reactive but concerned for DIC vs Liver Dysfunction  Send Coags, FSP, Fibrinogen, D Dimer    Atrial Fibrillation (new)  Amiodarone  Coagulation contraindicated with platelet function  Could be withdrawal,will need CIWA   EKG reviewed with atrial fibrillation and inferior lead T wave abnormality, QT prolonged    Elevated Troponin  Likely has CAD vs demand ischemia- defer to Cards  Troponin 19 this AM with elevated CKs  Trend  Cards following    Substance Abuse  Heroine and Cocaine abuse  Family unsure of Tobacco or ETOH use    Hepatitis C  HIV -  AST and T Bili elevated, May be playing a role in thrombocytopenia    Abnormal CT imaging  Cavitary lesions bilaterally, PPD can be read tomorrow evening 5-11  Airborne precautions  Pulm following, discussed with Dr. Fran Plummer    Code status: Full, discussed with family  DVT prophylaxis: SCDs    Care Plan discussed with: Patient/Family, Nurse and Consultant Neurosurgery, Nephrology, Pulmonology  Disposition: TBD     Hospital Problems  Date Reviewed: 5/9/2018          Codes Class Noted POA    Shock ([facility] [address] ICD-10-CM: R57.9  ICD-9-CM: 785.50  5/9/2018 Unknown        * (Principal)Sepsis ([facility] [address] ICD-10-CM: A41.9  ICD-9-CM: 038.9, 995.91  5/9/2018 Unknown                Review of Systems:   Review of systems not obtained due to patient factors. Vital Signs:    Last 24hrs VS reviewed since prior progress note. Most recent are:  Visit Vitals    BP (!) 123/91    Pulse (!) 103    Temp 97.6 °F (36.4 °C)    Resp (!) 35    Ht 6' (1.829 m)    Wt 85.5 kg (188 lb 9.6 oz)    SpO2 95%    BMI 25.58 kg/m2         Intake/Output Summary (Last 24 hours) at 05/10/18 0908  Last data filed at 05/10/18 0800   Gross per 24 hour   Intake             1215 ml   Output             3025 ml   Net            -1810 ml        Physical Examination:             Constitutional:  No acute distress, garbled speech, pleasant    ENT:  Oral mucous moist, oropharynx benign. Neck supple,    Resp:  Diminished bases blt. No wheezing/rhonchi/rales. No accessory muscle use   CV:  Regular rhythm, Sinus tachy, 3/6 systolic murmur, gallops, rubs    GI:  Soft, non distended, non tender.  normoactive bowel sounds, no hepatosplenomegaly     Musculoskeletal:  Mild generalized edema, warm, 2+ pulses throughout    Neurologic:  Moves all extremities. Disoriented, unable to assess EOMSI     Psych:  poor insight, confused  Skin:  good turgor, several lesions noted on chest, arms, no jeromy areas of rashes, infection  Hematologic/Lymphatic/Immunlogic:  No jaundice nor lymph node swelling  :  Normal genitalia. Data Review:    Review and/or order of clinical lab test  Review and/or order of tests in the radiology section of [facility]      Labs:     Recent Labs      05/10/18   0432  05/09/18   0320   WBC  32.0*  35.2*  35.2*   HGB  11.8*  15.4  15.4   HCT  36.0*  46.1  46.1   PLT  32*  84*  84*     Recent Labs      05/10/18   0432  05/09/18   0320   NA  147*  134*   K  3.6  4.0   CL  117*  94*   CO2  21  21   BUN  56*  56*   CREA  1.65*  2.78*   GLU  144*  104*   CA  7.0*  8.4*   MG  3.3*   --    PHOS  3.1   --      Recent Labs      05/10/18   0432  05/09/18   0320   SGOT  260*  137*   ALT  53  41   AP  68  111   TBILI  4.1*  5.6*   TP  6.2*  8.4*   ALB  1.5*  2.2*   GLOB  4.7*  6.2*   LPSE   --   138     Recent Labs      05/10/18   0432  05/09/18   1051  05/09/18   0320   INR  1.8*   --   1.5*   PTP  18.3*   --   15.4*   APTT   --   33.3*   --       No results for input(s): FE, TIBC, PSAT, FERR in the last 72 hours. No results found for: FOL, RBCF   No results for input(s): PH, PCO2, PO2 in the last 72 hours. Recent Labs      05/10/18   0605   CPK  303   CKNDX  10.4*   TROIQ  19.30*     Lab Results   Component Value Date/Time    Cholesterol, total <50 05/10/2018 04:32 AM    HDL Cholesterol 9 05/10/2018 04:32 AM    LDL, calculated  05/10/2018 04:32 AM     Unable to calculate LDL or VLDL due to low cholesterol.     Triglyceride 173 (H) 05/10/2018 04:32 AM    CHOL/HDL Ratio Cannot be calculated 05/10/2018 04:32 AM     Lab Results   Component Value Date/Time    Glucose (POC) 157 (H) 05/09/2018 12:51 PM    Glucose (POC) 96 05/09/2018 05:45 AM    Glucose (POC) 110 (H) 05/09/2018 05:03 AM     Lab Results   Component Value Date/Time    Color DARK YELLOW 05/09/2018 03:20 AM    Appearance CLOUDY (A) 05/09/2018 03:20 AM    Specific gravity 1.016 05/09/2018 03:20 AM    pH (UA) 5.5 05/09/2018 03:20 AM    Protein 30 (A) 05/09/2018 03:20 AM    Glucose NEGATIVE  05/09/2018 03:20 AM    Ketone NEGATIVE  05/09/2018 03:20 AM    Bilirubin SMALL (A) 11/25/2014 01:52 PM    Urobilinogen 1.0 05/09/2018 03:20 AM    Nitrites NEGATIVE  05/09/2018 03:20 AM    Leukocyte Esterase SMALL (A) 05/09/2018 03:20 AM    Epithelial cells FEW 05/09/2018 03:20 AM    Bacteria NEGATIVE  05/09/2018 03:20 AM    WBC 0-4 05/09/2018 03:20 AM    RBC 5-10 05/09/2018 03:20 AM         Medications Reviewed:     Current Facility-Administered Medications   Medication Dose Route Frequency    amiodarone (CORDARONE) 375 mg/250 mL D5W infusion  0.5-1 mg/min IntraVENous TITRATE    sodium chloride (NS) flush 5-10 mL  5-10 mL IntraVENous Q8H    sodium chloride (NS) flush 5-10 mL  5-10 mL IntraVENous PRN    acetaminophen (TYLENOL) tablet 975 mg  975 mg Oral ONCE PRN    sodium chloride (NS) flush 5-10 mL  5-10 mL IntraVENous PRN    piperacillin-tazobactam (ZOSYN) 3.375 g in 0.9% sodium chloride (MBP/ADV) 100 mL  3.375 g IntraVENous Q8H    [START ON 5/11/2018] levoFLOXacin (LEVAQUIN) 750 mg in D5W IVPB  750 mg IntraVENous Q48H    LORazepam (ATIVAN) injection 2 mg  2 mg IntraVENous Q4H PRN    Vancomycin Pharmacy Dosing   Other PRN    0.9% sodium chloride infusion  150 mL/hr IntraVENous CONTINUOUS    tuberculin injection 5 Units  5 Units IntraDERMal ONCE    sodium chloride (NS) flush 20 mL  20 mL InterCATHeter PRN    sodium chloride (NS) flush 10 mL  10 mL InterCATHeter Q24H    sodium chloride (NS) flush 10 mL  10 mL InterCATHeter PRN    sodium chloride (NS) flush 10 mL  10 mL InterCATHeter Q8H    alteplase (CATHFLO) 1 mg in sterile water (preservative free) 1 mL injection  1 mg InterCATHeter PRN     ______________________________________________________________________  EXPECTED LENGTH OF STAY: 4d 21h  ACTUAL LENGTH OF STAY:          [facility]

## 2018-05-10 NOTE — PROGRESS NOTES
1930: Verbal shift change report given to Ashly Garg, RN (oncoming nurse) by Calvin Boss RN (offgoing nurse). Report included the following information SBAR, Intake/Output, MAR, Recent Results, Cardiac Rhythm SR/ST and Alarm Parameters . 2290: Pt noted to be in Afib on the telemetry monitor. 8468: Rhythm confirmed via EKG  0545: Spoke with Dr. Luis Robbins rt new onset afib with no known history, labs ordered. Critical platelet 94,211 no new orders continue to monitor. 36: Spoke with Dr. Ministerio Bateman rt new onset afib, orders placed.  0702: Critical troponin 19.3, MD paged. 0: Spoke with Dr. Ministerio Bateman about troponin, no orders received. 0730: Verbal shift change report given to DILSHAD Russo (oncoming nurse) by Ashly Garg, RN (offgoing nurse). Report included the following information SBAR, Intake/Output, MAR, Recent Results, Cardiac Rhythm Afib and Alarm Parameters .

## 2018-05-10 NOTE — PROGRESS NOTES
Pharmacist Note - Vancomycin Dosing  Therapy day 2  Indication: sepsis  Current regimen: by levels    A Random Level resulted at 7.9 mcg/mL which was obtained 23.5 hrs post-dose. Goal trough: 15 - 20 mcg/mL     Plan: Vancomycin 1250 mg IV ONCE. Pharmacy will continue to monitor this patient daily for changes in clinical status and renal function.

## 2018-05-10 NOTE — CONSULTS
[address]    Lisa Hemphill  MR#: [id number]  : 1960  ACCOUNT #: [de-identified]   DATE OF SERVICE: 2018    HISTORY OF PRESENT ILLNESS:  The patient is a 54-year-old man with an established history of intravenous drug use, was brought to the emergency room by rescue squad by his family with reduction in his mental status, was found to be febrile with a very high white count, had abnormal chest x-ray with cavitary lesions in both lungs, and further evaluation indicated a large vegetation on the right ventricular side of the tricuspid valve. In addition, there was a flail mitral leaflet with severe mitral regurgitation. This is the reason we were called to see the patient. He was seen in the ICU. He remains altered mental status, mumbling, not conscious or coherent. PHYSICAL EXAMINATION:   VITAL SIGNS:  As follows:  Temperature is 100 at this time, heart rate is 89, respirations 33, blood pressure 111/66, saturation 94% on 2 liters. HEENT:  Unremarkable. NECK:  Supple. CHEST WALL:  Unremarkable. LUNGS:  Decreased breath sounds, scattered rhonchi and rales. HEART:  Regular tachycardia. Harsh systolic murmur at the second left intercostal space, left lower sternal border and radiating to the left axilla. No diastolic murmur. No friction rub, no thrills, lifts or heaves. ABDOMEN:  Soft, bowel sounds are present. EXTREMITIES:  No edema. Pedal pulses are palpable. Radial pulses are palpable. REVIEW OF SYSTEMS:  Please note that a review of systems is not possible as the patient is obtunded and incoherent. LABORATORY DATA AND STUDIES:  The patient's echocardiogram demonstrates severe mitral regurgitation with flail mitral leaflet, appears to be the anterior leaflet. There is no pulmonary edema on his chest x-ray. He does have the cavitary lesions. EKG demonstrates a sinus tachycardia without diagnostic ST-T changes.      Head CT demonstrates a subtle area of hyperattenuation in the right posterior parietal lobe. Labs were very high white count, normal hemoglobin and hematocrit, very low platelet count at 71,386. Chemistries include a BUN of 56, creatinine 2.78, potassium 4.0. IMPRESSION:  This patient has severe mitral regurgitation, probably not acute, possibly subacute. He is hemodynamically fairly stable. Clearly the patient has endocarditis. The picture is most consistent with this and his initial blood cultures are positive with all bottles growing Gram-positive clusters. Again, the patient is hemodynamically stable and his ejection fraction is normal and he is currently not requiring any pressors. Cardiac surgery has been called to assist in the patient's management if and when he becomes a surgical candidate. We will continue the present course with multiple antibiotics and fluid resuscitation and will follow along with you closely.       MD MILI Arreguin / Wilils Esquivel  D: 05/09/2018 19:25     T: 05/09/2018 20:12  JOB #: [id number]

## 2018-05-10 NOTE — PROGRESS NOTES
[facility] Progress Note    Admit Date: 2018        Subjective:   Pt seen with Dr. Navarro Form. In bed, more alert this morning. Tmax 100, on 2L NC. Objective:     Visit Vitals    /85    Pulse 62    Temp 96.9 °F (36.1 °C)    Resp 24    Ht 6' (1.829 m)    Wt 188 lb 9.6 oz (85.5 kg)    SpO2 93%    BMI 25.58 kg/m2       Temp (24hrs), Av.6 °F (37 °C), Min:96.9 °F (36.1 °C), Max:100 °F (37.8 °C)      Last 24hr Input/Output:    Intake/Output Summary (Last 24 hours) at 05/10/18 1216  Last data filed at 05/10/18 1200   Gross per 24 hour   Intake             3892 ml   Output             2820 ml   Net             1072 ml        EKG: NSR    Oxygen: 2L    CXR: IMPRESSION: Cavitary nodules are again noted but are better visualized on CT  dated 2018. There is slight increasing right basilar atelectasis. Admission Weight: Last Weight   Weight: 178 lb 9.6 oz (81 kg) Weight: 188 lb 9.6 oz (85.5 kg)       EXAM:      Lungs:   Clear upper, diminished bases to auscultation bilaterally. Heart:  Regular rate and rhythm, S1, S2 normal, + murmur, click, rub or gallop. Abdomen:   Soft, non-tender. Bowel sounds normal. No masses,  No organomegaly. Extremities:  1+ edema. PPP. Neurologic:  Gross motor and sensory apparatus intact. Activity: up with assistance    Diet:  NPO    Lab Data Reviewed: Recent Labs      05/10/18   0727  05/10/18   0432   WBC   --   32.0*   HGB   --   11.8*   HCT   --   36.0*   PLT   --   32*   CREA   --   1.65*   INR  1.8*  1.8*       Cardiac Testing:       Assessment:     Principal Problem:    Sepsis ([facility] [address] (2018)    Active Problems:    Shock ([facility] [address] (2018)             Plan/Recommendations/Medical Decision Makin.  Sepsis with endocarditis of mitral and tricuspid valves: Tmax 100, WBC 32, lactate improved some now 2.1, BC + GPC, ID consult, cont IV fluids, broad spectrum abx, preop workup in process, surgical plans per Dr. Navarro Form -will need at least 6 weeks of IV antibiotics prior to surgery     2. Encephalopathy: Head CT completed, MRI pending, monitor     3. Acute renal failure: improved some, Cr 1.65, Nephrology following, monitor U/O     4. Thrombocytopenia: Plt 32, monitor     5. Hep C/Elevated liver enzymes: Tbili 4.1, , cont to monitor     6. Hx IVDA: cocaine and heroin abuse, HIV negative     7. Hx HTN: monitor     8. Respiratory insufficiency: CXR with multiple cavitary lung lesions, septic empoli vs. Tumor vs. TB workup pending. Cont airborne precautions, Wean O2 for sats >92%, monitor. 9. Elevated troponin: Troponin 19, cardiology following    10. Paroxysmal Afib: Currently NSR, monitor    Signed By: Rashi Romero NP    Saw patient, agree with above  Risk of morbidity and mortality - high  Medical decision making - high complexity    Medical decision making    1. Sepsis with endocarditis of mitral and tricuspid valves: Abx's for now    2. Encephalopathy: monitor    3. Acute renal failure: Nephrology following    4. Thrombocytopenia: Plt 84, monitor    5. Hep C/Elevated liver enzymes: monitor    6. Hx IVDA: cocaine and heroin abuse, HIV pending    7. Hx HTN: monitor    8.  Respiratory insufficiency: monitor

## 2018-05-11 NOTE — PROGRESS NOTES
Hospitalist Progress Note  Adonay Sanchez NP  Answering service: [phone] OR 36 from in house phone  Cell: [phone]      Date of Service:  2018  NAME:  Luis Alberto Herman  :  1960  MRN:  [id number]      Admission Summary:   62year old with PMH of COPD, HTN, and Hep C with significant forty plus year drug abuse history. The patient lives in assisted living at [address] [place] [address] in an independent apartment per his brothers. He is   Normally alert and oriented but is not close to his family. He presented with altered mental status and Severe  Sepsis picture. He was admitted and we were consulted to manage these issues    Interval history / Subjective:     Mr. Denis Padilla is intubated and sedated.  He had cardiac arrest last pm. No family present     Assessment & Plan:     Severe Sepsis with multisystem organ dysfunction in the setting of staph Endocarditis(POA)  Leukocytosis, Fever, Tachycardia, Hypotension, Elevated Lactate  Vanc, Levaquin and Zosyn since , now Vanc and Zosyn  Paired blood cultures with MRSA, UA  Chest CT with multiple cavitary lesions?or septic emboli, more likely  IVF at 150 / hour, decrease to 100  Lactate trended down 2.1  CT of abdomen ok, skin examined  Echo with EF 55-60%, NRWMA, MV vegetation, TV vegetation  CT Surgery and ID following  HIV test negative    Acute Renal Failure  Urine lytes and renal ultrasound  Monitor I and O, weights  Nephrology following    Acute Metabolic Encephalopathy  Head CT with Subtle area of hyperattenuation in the right posterior parietal  Lobe, concern for septic emboli  Brain MRI ordered but not stable to perform  Discussed with Neurosurgery    Leukocytosis  WBC trending down  Due to above    Thrombocytopenia  Likely reactive but concerned for DIC vs Liver Dysfunction  Trend Coags, FSP, Fibrinogen, D Dimer  No signs of bleeding    Atrial Fibrillation (new)  Amiodarone dc;d  Coagulation contraindicated with platelet function  EKG reviewed with atrial fibrillation and inferior lead T wave abnormality, QT prolonged    Elevated Troponin, s/p cardiac arrest, Acute Diastolic Heart Failure  Likely has CAD vs demand ischemia- defer to Cards  Troponin 19 this AM with elevated CKs  Cannot obtain cath   temporary pacemaker per Mary Anne Cordon  Repeat Echo ordered, Dobutamine, Elevated BNP  Will need diuretics, Bumex today    Substance Abuse  Heroine and Cocaine abuse  Family unsure of Tobacco or ETOH use    Hepatitis C  HIV -  AST and T Bili elevated, May be playing a role in thrombocytopenia    Abnormal CT imaging  Cavitary lesions bilaterally, PPD -, AFB culture sent  Airborne precautions dc'd  Pulm following, discussed with Dr. Angeli Marinelli    Code status: Full, discussed with family  DVT prophylaxis: SCDs    Care Plan discussed with: Patient/Family, Nurse and Consultant Neurosurgery, Nephrology, Pulmonology  Disposition: D     Hospital Problems  Date Reviewed: 5/9/2018          Codes Class Noted POA    Shock ([facility] [address] ICD-10-CM: R57.9  ICD-9-CM: 785.50  5/9/2018 Unknown        * (Principal)Sepsis ([facility] [address] ICD-10-CM: A41.9  ICD-9-CM: 038.9, 995.91  5/9/2018 Unknown                Review of Systems:   Review of systems not obtained due to patient factors. Vital Signs:    Last 24hrs VS reviewed since prior progress note. Most recent are:  Visit Vitals    BP 93/67    Pulse 64    Temp 98.6 °F (37 °C)    Resp 25    Ht 6' (1.829 m)    Wt 86 kg (189 lb 9.6 oz)    SpO2 100%    BMI 25.71 kg/m2         Intake/Output Summary (Last 24 hours) at 05/11/18 1458  Last data filed at 05/11/18 1400   Gross per 24 hour   Intake          2869.62 ml   Output             1430 ml   Net          1439.62 ml        Physical Examination:             Constitutional:  Intubated and Sedated   ENT:  ET tube present, oropharynx benign. Neck supple,    Resp:  Diminished bases blt. No wheezing/rhonchi/rales.  No accessory muscle use   CV:  Regular rhythm, Sinus tachy, 3/6 systolic murmur, gallops, rubs    GI:  Soft, non distended, non tender. normoactive bowel sounds, no hepatosplenomegaly     Musculoskeletal:  Moderate generalized edema, warm, 2+ pulses throughout    Neurologic:  unable to assess     Psych:  poor insight, confused  Skin:  good turgor, several lesions noted on chest, arms, no jeromy areas of rashes, infection  Hematologic/Lymphatic/Immunlogic:  No jaundice nor lymph node swelling  :  Normal genitalia. Data Review:    Review and/or order of clinical lab test  Review and/or order of tests in the radiology section of [facility]      Labs:     Recent Labs      05/11/18   0453  05/10/18   2137   WBC  25.7*  28.1*   HGB  12.4  12.8   HCT  37.5  39.7   PLT  26*  28*     Recent Labs      05/11/18   0453  05/10/18   2137  05/10/18   0432   NA  148*  150*  147*   K  4.0  4.1  3.6   CL  118*  117*  117*   CO2  19*  24  21   BUN  70*  65*  56*   CREA  1.65*  1.57*  1.65*   GLU  161*  148*  144*   CA  7.0*  6.9*  7.0*   MG  3.6*  3.4*  3.3*   PHOS  3.9   --   3.1     Recent Labs      05/11/18   0453  05/10/18   0432  05/09/18   0320   SGOT  176*  260*  137*   ALT  59  53  41   AP  79  68  111   TBILI  4.0*  4.1*  5.6*   TP  6.0*  6.2*  8.4*   ALB  1.5*  1.5*  2.2*   GLOB  4.5*  4.7*  6.2*   LPSE   --    --   138     Recent Labs      05/11/18   0453  05/10/18   2137  05/10/18   0727   05/09/18   1051   INR  1.8*  1.9*  1.8*   < >   --    PTP  17.7*  18.8*  17.9*   < >   --    APTT   --   30.7  31.6   --   33.3*    < > = values in this interval not displayed. No results for input(s): FE, TIBC, PSAT, FERR in the last 72 hours. No results found for: FOL, RBCF   No results for input(s): PH, PCO2, PO2 in the last 72 hours.   Recent Labs      05/10/18   0605   CPK  303   CKNDX  10.4*   TROIQ  19.30*     Lab Results   Component Value Date/Time    Cholesterol, total <50 05/10/2018 04:32 AM    HDL Cholesterol 9 05/10/2018 04:32 AM LDL, calculated  05/10/2018 04:32 AM     Unable to calculate LDL or VLDL due to low cholesterol.     Triglyceride 173 (H) 05/10/2018 04:32 AM    CHOL/HDL Ratio Cannot be calculated 05/10/2018 04:32 AM     Lab Results   Component Value Date/Time    Glucose (POC) 112 (H) 05/11/2018 05:33 AM    Glucose (POC) 157 (H) 05/09/2018 12:51 PM    Glucose (POC) 96 05/09/2018 05:45 AM    Glucose (POC) 110 (H) 05/09/2018 05:03 AM     Lab Results   Component Value Date/Time    Color DARK YELLOW 05/09/2018 03:20 AM    Appearance CLOUDY (A) 05/09/2018 03:20 AM    Specific gravity 1.016 05/09/2018 03:20 AM    pH (UA) 5.5 05/09/2018 03:20 AM    Protein 30 (A) 05/09/2018 03:20 AM    Glucose NEGATIVE  05/09/2018 03:20 AM    Ketone NEGATIVE  05/09/2018 03:20 AM    Bilirubin SMALL (A) 11/25/2014 01:52 PM    Urobilinogen 1.0 05/09/2018 03:20 AM    Nitrites NEGATIVE  05/09/2018 03:20 AM    Leukocyte Esterase SMALL (A) 05/09/2018 03:20 AM    Epithelial cells FEW 05/09/2018 03:20 AM    Bacteria NEGATIVE  05/09/2018 03:20 AM    WBC 0-4 05/09/2018 03:20 AM    RBC 5-10 05/09/2018 03:20 AM         Medications Reviewed:     Current Facility-Administered Medications   Medication Dose Route Frequency    bumetanide (BUMEX) injection 1 mg  1 mg IntraVENous BID    propofol (DIPRIVAN) infusion  0-30 mcg/kg/min IntraVENous TITRATE    fentaNYL (PF) 900 mcg/30 ml infusion soln  0-200 mcg/hr IntraVENous TITRATE    albuterol-ipratropium (DUO-NEB) 2.5 MG-0.5 MG/3 ML  3 mL Nebulization Q6H RT    chlorhexidine (PERIDEX) 0.12 % mouthwash 15 mL  15 mL Oral BID    pantoprazole (PROTONIX) 40 mg in sodium chloride 0.9% 10 mL injection  40 mg IntraVENous DAILY    acetaminophen (TYLENOL) suppository 650 mg  650 mg Rectal Q6H PRN    vancomycin (VANCOCIN) 1250 mg in  ml infusion  1,250 mg IntraVENous Q16H    0.45% sodium chloride infusion  75 mL/hr IntraVENous CONTINUOUS    DOBUTamine (DOBUTREX) 500 mg/250 mL (2,000 mcg/mL) infusion  5 mcg/kg/min IntraVENous TITRATE    sodium chloride (NS) flush 5-10 mL  5-10 mL IntraVENous Q8H    sodium chloride (NS) flush 5-10 mL  5-10 mL IntraVENous PRN    sodium chloride (NS) flush 5-10 mL  5-10 mL IntraVENous PRN    piperacillin-tazobactam (ZOSYN) 3.375 g in 0.9% sodium chloride (MBP/ADV) 100 mL  3.375 g IntraVENous Q8H    LORazepam (ATIVAN) injection 2 mg  2 mg IntraVENous Q4H PRN    Vancomycin Pharmacy Dosing   Other PRN    sodium chloride (NS) flush 20 mL  20 mL InterCATHeter PRN    sodium chloride (NS) flush 10 mL  10 mL InterCATHeter Q24H    sodium chloride (NS) flush 10 mL  10 mL InterCATHeter PRN    sodium chloride (NS) flush 10 mL  10 mL InterCATHeter Q8H    alteplase (CATHFLO) 1 mg in sterile water (preservative free) 1 mL injection  1 mg InterCATHeter PRN     ______________________________________________________________________  EXPECTED LENGTH OF STAY: 4d 21h  ACTUAL LENGTH OF STAY:          [address] [facility] [address] [facility],

## 2018-05-11 NOTE — PROGRESS NOTES
TRANSFER - IN REPORT:    Verbal report received from [facility]) on Zaida Yoel  being received from ICU(unit) for routine progression of care      Report consisted of patients Situation, Background, Assessment and   Recommendations(SBAR). Information from the following report(s) SBAR, Kardex, [facility] and Recent Results was reviewed with the receiving nurse. Opportunity for questions and clarification was provided. Assessment completed upon patients arrival to unit and care assumed. 2000:  Bedside shift change report given to Eleonora Wise (oncoming nurse) by Dimas Herzog (offgoing nurse). Report included the following information SBAR, Kardex, MAR and Recent Results. Primary Nurse Kamila Kearns, DILSHAD and Ander Anaya RN performed a dual skin assessment on this patient Impairment noted- see wound doc flow sheet  Darwin score is 8    On admission:    Ulcerated are to lateral right lower extremity. Open and about the size of a quarter. Open area (shearing?) to bilateral buttocks  Scattered lesions and scabs, in different stages of healing and scarring noted on entire body.

## 2018-05-11 NOTE — PROGRESS NOTES
5/11/2018     Admit Date: 5/9/2018    Admit Diagnosis: Sepsis ([facility] [address]  Shock ([facility] [address]    Principal Problem:    Sepsis ([facility] [address] (5/9/2018)    Active Problems:    Shock ([facility] [address] (5/9/2018)        Assessment/Plan:  Events noted. The patient appears to have had a cardiac arrest  Has moderate pulmonary edema on chest xray. Currently in an idioventricular rhythm  Very likely has underlying CAD  Plan:  Needs diuretics  Unable to get follow up head CT d/t last night's events  Unable to proceed with cath an abnormal head CT and profound hypokalemia  Will discuss case with daughter and if she consents will proceed with a temporary pacemaker     Subjective:  Vented and sedated post events       Huong Lopez does not respond. Objective:     Visit Vitals    BP (!) 89/75    Pulse 64    Temp 97.3 °F (36.3 °C)    Resp 26    Ht 6' (1.829 m)    Wt 86 kg (189 lb 9.6 oz)    SpO2 100%    BMI 25.71 kg/m2        Physical Exam:  Neck: supple, symmetrical, trachea midline, no adenopathy, thyroid: not enlarged, symmetric, no tenderness/mass/nodules, no carotid bruit and no JVD  Heart: regular rate and rhythm, S1, S2 normal, systolic murmur: holosystolic 3/6, grunting at 2nd left intercostal space, at lower left sternal border, at apex, no rub  Lungs: diminished breath sounds R anterior, L anterior  Abdomen: soft  Extremities: edema 1+ lower extremity edema  Pulses: 2+ and symmetric  Neurologic: Mental status: sedated      Labs:    Recent Labs      05/10/18   0605   CPK  303   CKMB  31.6*   TROIQ  19.30*     Recent Labs      05/11/18   0453   NA  148*   K  4.0   CL  118*   BUN  70*   CREA  1.65*   GLU  161*   PHOS  3.9   CA  7.0*     Recent Labs      05/11/18   0453   WBC  25.7*   HGB  12.4   HCT  37.5   PLT  26*     Recent Labs      05/10/18   0432   CHOL  <50   LDLC  Unable to calculate LDL or VLDL due to low cholesterol.        Telemetry: normal sinus rhythm     Data Review: current meds, labs,recent radiology, intake/output/weight and problem list reviewed

## 2018-05-11 NOTE — PROGRESS NOTES
rcd report from Alverto Donis arrived in [address] from ICU as lateral transfer    Primary Nurse Joseluis Bruno, RN and Everton Oswald RN performed a dual skin assessment on this patient Impairment noted- see wound doc flow sheet  Darwin score is 8    Patient resting on Total Care bed    Pt is on respirator, tachypneic RR 30's  Hypotensive SBP 80's  Neosynephrine titrated to keep  or greater    2035 Diprivan titrated for sedation,   pt is agitated and biting tubes, vent alarms going off    2315 family here visiting    0300 TF Osmolite 1.5 started at 20cc/H  Flushed w/ H2O 150cc    0315 José max at 100mcg/m SBP 80-90\"s,                                                  DBP 60-70's,                                                   MAP 60-70's    0800 Bedside and Verbal shift change report given to RN (oncoming nurse) by Guy Donis RN (offgoing nurse). Report included the following information SBAR, Kardex, Procedure Summary, Intake/Output, MAR, Recent Results and Cardiac Rhythm V paced.

## 2018-05-11 NOTE — PROGRESS NOTES
1930: Verbal shift change report given to [employer], RN (oncoming nurse) by Faby Wynn (offgoing nurse). Report included the following information SBAR, Intake/Output, MAR, Recent Results, Cardiac Rhythm Afib/ NSR and Alarm Parameters . 2000: RN at bedside with off going nurse in response to pause noted on monitor, pt sitting in bed, hi-flow nasal cannula in place, pt trying to remove the cannula, rapid abdominal breathing. Pt constantly trying to clear throat, nonproductive. Pt NT suctioned by RN, large amount of thin white/pink/bloody sputum removed followed by large bloody plug measuring approximately 6 inches removed. 2015: Anesthesia at bedside for emergent intubation, pt medicated. 2020:Pt intubated, 23cm @ lips, breath sounds heard bilaterally, CXR ordered, pt started on ventilator. 2025: Radiology paged for stat CXR to confirm ETT placement. 2030: RN, off going RN and RTT at bedside when pt noted to be asystole on the monitor, no pulse palpable. Code Blue called and compressions started. SROC achieved within 1 minute. 2033: MD at bedside, orders received for labs and EKG. 2038: Post arrest EKG NSR with R BBB. 2045: ABG obtained, critical pH 7.195, CO2 56.2, HCO3 21.7. MD notified, vent settings adjusted by RT.  2057: 4 sec pause noted on monitor then NSR.   2220: Sister Nasir Cali called and updated on pt's condition. 2226: Critical platelets- 77,373 called by lab, MD notified, no new orders received. 2228: Cardiology paged. Updated on pt's condition, pt now intubated HR 40-50's, SBP 90-80's, MAP >65. Orders received for dobutamine at 5mcg.  2301: Dobutamine started  2337: Rhythm change noted on monitor. EKG obtained. 0000: Sister Peter Mtz at bedside. Pt's situation explained, sister does not appear comprehend, sister appears impaired, unable to maintain balance, and slurred speech. 0030: EKG reviewed by CCU to confirm new L BBB.    65: Spoke with Dr. Jace Best about rhythm change and new left BBB, no orders received, continue to monitor. 0100: Spoke with sister Olga Celestin about pt's condition. 0207: Rhythm change, junctional, not sustained. 0300: Pt noted to be profusely sweating, pt is afebrile. 0456: EKG obtained with morning labs, accelerated junctional.  0730: Bedside shift change report given to DILSHAD Russo (oncoming nurse) by Kevon Brooks RN (offgoing nurse). Report included the following information SBAR, Intake/Output, MAR, Recent Results, Cardiac Rhythm accelerated junctional  and Alarm Parameters . Summary: Pt intubated and sedated. Propofol @2mcg, precedex @ 1.4, pt MORALES, opens eyes to stimulation.

## 2018-05-11 NOTE — PROCEDURES
Cardiac Catheterization Procedure Note   Patient: Donaldo Howard  MRN: [id number]  SSN: [id number]   YOB: 1960 Age: 62 y.o. Sex: male    Date of Procedure: 5/11/2018   Pre-procedure Diagnosis: heart block and cardiac arrest  Post-procedure Diagnosis: same  Procedure: Temporary transvenous pacemaker insertion via the femoral vein  :  Dr. Vishnu Felder MD    Assistant(s):  None  Anesthesia: Moderate Sedation   Estimated Blood Loss: Less than 10 mL   Specimens Removed: None  Findings: A 5 Fr transvenous temporary PM wire was inserted from the right femoral vein and advanced to the RV apex. Excellent thresholds were achieved. The pacemaker was secured and the patient was then transferred back to the ICU in stable condition.    Complications: None   Implants:  None  Signed by:  Vsihnu Felder MD  5/11/2018  4:27 PM

## 2018-05-11 NOTE — PROGRESS NOTES
[facility] Progress Note    Admit Date: 2018        Subjective:   Pt seen with Dr. Michael Wheatley. Sedated, on vent FiO2 40%. On dobut 5, propofol, fentanyl.   Awakens but not following commands; started TF's     Objective:     Visit Vitals    /80    Pulse 67    Temp 98.6 °F (37 °C)    Resp 22    Ht 6' (1.829 m)    Wt 189 lb 9.6 oz (86 kg)    SpO2 100%    BMI 25.71 kg/m2       Temp (24hrs), Av.7 °F (36.5 °C), Min:96.8 °F (36 °C), Max:98.6 °F (37 °C)      Last 24hr Input/Output:    Intake/Output Summary (Last 24 hours) at 18 1214  Last data filed at 18 1200   Gross per 24 hour   Intake          2233.99 ml   Output             1445 ml   Net           788.99 ml        EKG: idioventricular rhythm      Current Facility-Administered Medications:     vancomycin (VANCOCIN) 1250 mg in  ml infusion, 1,250 mg, IntraVENous, Q18H, Channing Silva MD, Last Rate: 125 mL/hr at 18 0451, 1,250 mg at 18 0451    amiodarone (CORDARONE) 375 mg/250 mL D5W infusion, 0.5-1 mg/min, IntraVENous, TITRATE, Latasha Luo MD, Last Rate: 20 mL/hr at 18 0529, 0.5 mg/min at 18 0529    bumetanide (BUMEX) injection 1 mg, 1 mg, IntraVENous, BID, Beth Cancino MD, 1 mg at 18 0909    propofol (DIPRIVAN) infusion, 0-30 mcg/kg/min, IntraVENous, TITRATE, Deepali Zhu MD, Last Rate: 12.9 mL/hr at 18 1204, 25 mcg/kg/min at 18 1204    fentaNYL (PF) 900 mcg/30 ml infusion soln, 0-200 mcg/hr, IntraVENous, TITRATE, Deepali Zhu MD, Last Rate: 5 mL/hr at 18 1051, 150 mcg/hr at 18 1051    albuterol-ipratropium (DUO-NEB) 2.5 MG-0.5 MG/3 ML, 3 mL, Nebulization, Q6H RT, Deepali Zhu MD, 3 mL at 18 1502    chlorhexidine (PERIDEX) 0.12 % mouthwash 15 mL, 15 mL, Oral, BID, Deepali Zhu MD, 15 mL at 18 0909    pantoprazole (PROTONIX) 40 mg in sodium chloride 0.9% 10 mL injection, 40 mg, IntraVENous, DAILY, Deepali Zhu MD, 40 mg at 18 0909    PHENYLephrine (PF)(PATSY-SYNEPHRINE) 30 mg in 0.9% sodium chloride 250 mL infusion,  mcg/min, IntraVENous, TITRATE, Juana Stewart MD, Last Rate: 45 mL/hr at 05/13/18 1223, 90 mcg/min at 05/13/18 1223    acetaminophen (TYLENOL) suppository 650 mg, 650 mg, Rectal, Q6H PRN, Shamir Catalan MD    DOBUTamine (DOBUTREX) 500 mg/250 mL (2,000 mcg/mL) infusion, 2.5 mcg/kg/min, IntraVENous, TITRATE, Nadege Marcos MD, Last Rate: 6.4 mL/hr at 05/13/18 0509, 2.5 mcg/kg/min at 05/13/18 0509    sodium chloride (NS) flush 5-10 mL, 5-10 mL, IntraVENous, Q8H, Lila Ac MD, 10 mL at 05/13/18 1357    sodium chloride (NS) flush 5-10 mL, 5-10 mL, IntraVENous, PRN, Lila Ac MD    sodium chloride (NS) flush 5-10 mL, 5-10 mL, IntraVENous, PRN, Lila Ac MD    piperacillin-tazobactam (ZOSYN) 3.375 g in 0.9% sodium chloride (MBP/ADV) 100 mL, 3.375 g, IntraVENous, Q8H, Lila Ac MD, Last Rate: 25 mL/hr at 05/13/18 1150, 3.375 g at 05/13/18 1150    LORazepam (ATIVAN) injection 2 mg, 2 mg, IntraVENous, Q4H PRN, Kobe Cooper NP    Vancomycin Pharmacy Dosing, , Other, PRN, Izabella Varner MD    sodium chloride (NS) flush 20 mL, 20 mL, InterCATHeter, PRN, Izabella Varner MD    sodium chloride (NS) flush 10 mL, 10 mL, InterCATHeter, Q24H, Izabella Varner MD, 10 mL at 05/12/18 2038    sodium chloride (NS) flush 10 mL, 10 mL, InterCATHeter, PRN, Izabella Varner MD    sodium chloride (NS) flush 10 mL, 10 mL, InterCATHeter, Q8H, Izabella Varner MD, 10 mL at 05/13/18 1357    alteplase (CATHFLO) 1 mg in sterile water (preservative free) 1 mL injection, 1 mg, InterCATHeter, PRN, Izabella Varner MD      Oxygen: 40% fio2    CXR: IMPRESSION:   1. Increased consolidation in the left lung base which may represent  atelectasis. 2.  Persistent perihilar opacities which may represent edema.       Admission Weight: Last Weight   Weight: 178 lb 9.6 oz (81 kg) Weight: 189 lb 9.6 oz (86 kg)       EXAM:      Lungs: Clear upper, diminished bases to auscultation bilaterally. Heart:  Regular rate and rhythm, S1, S2 normal, + murmur, click, rub or gallop. Abdomen:   Soft, non-tender. Bowel sounds normal. No masses,  No organomegaly. Extremities:  1+ edema. PPP. Neurologic:  Gross motor and sensory apparatus intact. Activity: up with assistance    Diet:  NPO    Lab Data Reviewed:   Recent Labs      18   0453   WBC  25.7*   HGB  12.4   HCT  37.5   PLT  26*   CREA  1.65*   INR  1.8*       Cardiac Testing:       Assessment:     Principal Problem:    Sepsis ([facility] [address] (2018)    Active Problems:    Shock ([facility] [address] (2018)             Plan/Recommendations/Medical Decision Makin. 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 Brooks Forbes -will need at least 6 weeks of IV antibiotics prior to surgery     2. Encephalopathy: Head CT completed, MRI pending, monitor     3. Acute renal failure:  Cr 1.65, Nephrology following, monitor U/O     4. Thrombocytopenia: Plt 26, monitor     5. Hep C/Elevated liver enzymes: Tbili 4., , cont to monitor     6. Hx IVDA: cocaine and heroin abuse, HIV negative     7. Hx HTN: monitor     8. Respiratory failure: Intubated overnight, pulmonary following. CXR with multiple cavitary lung lesions, septic empoli vs. Tumor vs. TB workup pending. Cont airborne precautions     9. Elevated troponin: cardiology following    10. Paroxysmal Afib: Currently idioventricular rhythm, plans for transvenous pacemaker today, monitor    Signed By: Mary Centeno NP    Saw patient, agree with above  Risk of morbidity and mortality - high  Medical decision making - high complexity    Medical decision making     1. 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 Encephalopathy: monitor     3.  Acute renal failure: Nephrology following     4. Thrombocytopenia: Plt 84, monitor     5. Hep C/Elevated liver enzymes: monitor     6. Hx IVDA: cocaine and heroin abuse, HIV pending     7. Hx HTN: monitor     8.  Respiratory insufficiency: monitor

## 2018-05-11 NOTE — PROGRESS NOTES
Advance Care Planning Note    Name: aRoul Rhodes  YOB: 1960  MRN: [id number]  Admission Date: 5/9/2018  4:45 AM    Date of discussion: 5/11/2018    Active Diagnoses:    Hospital Problems  Date Reviewed: 5/9/2018          Codes Class Noted POA    Shock ([facility] [address] ICD-10-CM: R57.9  ICD-9-CM: 785.50  5/9/2018 Unknown        * (Principal)Sepsis ([facility] [address] ICD-10-CM: A41.9  ICD-9-CM: 038.9, 995.91  5/9/2018 Unknown               These active diagnoses are of sufficient risk that focused discussion on advance care planning is indicated in order to allow the patient to thoughtfully consider personal goals of care, and if situations arise that prevent the ability to personally give input, to ensure appropriate representation of their personal desires for different levels and aggressiveness of care. Discussion:     Persons present and participating in discussion: Raoul Rhodes, Eh Wilson NP, Patient's brother Solange Stringer and his wife,  Clement Flair and Palliative medicine    Discussion: We discussed how ill the patient is. His septic endocarditis and septic emboli as well as his other comorbidities would be difficult to recover from. They would like the brain MRI to be performed. He can have this now that he is off airborne precautions. We discussed Code Status, however since there is no AMD, we will need majority consensus from siblings to agree to this. Solange Stringer would like a DNR and Palliative medicine will work on communication with other 4 siblings. There are other siblings who also use substances and are not appropriate for this discussion. Time Spent:     Total time spent face-to-face in education and discussion: 30 minutes.      Eh Wilson NP  5/11/2018  3:59 PM

## 2018-05-11 NOTE — PROGRESS NOTES
Spiritual Care Assessment/Progress Note  [facility]      NAME: Jose Enrique He      MRN: [id number]  AGE: 62 y.o. SEX: male  Anabaptist Affiliation: Yarsani   Language: English     5/11/2018           Spiritual Assessment begun in [facility] 7S1 INTENSIVE CARE through conversation with:         []Patient        [] Family    [] Friend(s)        Reason for Consult: Initial/Spiritual assessment, critical care     Spiritual beliefs: (Please include comment if needed)     [] Identifies with a walter tradition:     [] Supported by a walter community:      [] Claims no spiritual orientation:      [] Seeking spiritual identity:           [] Adheres to an individual form of spirituality:      [x] Not able to assess:                     Identified resources for coping:      [] Prayer                               [] Music                  [] Guided Imagery     [] Family/friends                 [] Pet visits     [] Devotional reading                         [] Unknown     [] Other:                                               Interventions offered during this visit: (See comments for more details)    Patient Interventions: Initial visit           Plan of Care:     [] Support spiritual and/or cultural needs    [] Support AMD and/or advance care planning process      [] Support grieving process   [] Coordinate Rites and/or Rituals    [] Coordination with community clergy   [] No spiritual needs identified at this time   [] Detailed Plan of Care below (See Comments)  [] Make referral to Music Therapy  [] Make referral to Pet Therapy     [] Make referral to Addiction services  [] Make referral to [facility]  [] Make referral to Spiritual Care Partner  [] No future visits requested        [] Follow up visits as needed     Comments: Mr Saleem Colmenares in ICU-04 was resting with eyes closed when  arrived; no family was present. Conferred with patient's nurse, Verna, regarding patient's status & POC.  Unable to do spiritual assessment due to patient's condition. Please notify [phone] [address] if  support desired. : Rev. Max Espinal.  Vilma Phillips; [facility], to contact [phone] [address] call: [phone]

## 2018-05-11 NOTE — PROGRESS NOTES
ID Progress Note  2018     vancharis Jacobon     Subjective:     Afebrile. Events noted. Intubated. BNP markedly elevated. He is sedated. Objective:     Vitals:   Visit Vitals    BP 97/78    Pulse 66    Temp 98.3 °F (36.8 °C)    Resp 27    Ht 6' (1.829 m)    Wt 86 kg (189 lb 9.6 oz)    SpO2 100%    BMI 25.71 kg/m2        Tmax:  Temp (24hrs), Av.5 °F (36.4 °C), Min:96.8 °F (36 °C), Max:98.3 °F (36.8 °C)      Exam:    On the vent, sedated  Eyes: (+) conjunctival hemorrhage, anicteric sclerae  (+) CVL on right upper chest nontender   Lungs: clear to auscultation anteriorly, no rales, wheezes   Heart: (+) systolic murmur   Abdomen: soft, nontender, no guarding or rebound   Extremities: knees not warm or tender       Labs:   Lab Results   Component Value Date/Time    WBC 25.7 (H) 2018 04:53 AM    HGB 12.4 2018 04:53 AM    Hematocrit (POC) 35 (L) 2018 05:03 AM    HCT 37.5 2018 04:53 AM    PLATELET 26 (LL)  04:53 AM    MCV 82.4 2018 04:53 AM     Lab Results   Component Value Date/Time    Sodium 148 (H) 2018 04:53 AM    Potassium 4.0 2018 04:53 AM    Chloride 118 (H) 2018 04:53 AM    CO2 19 (L) 2018 04:53 AM    Anion gap 11 2018 04:53 AM    Glucose 161 (H) 2018 04:53 AM    BUN 70 (H) 2018 04:53 AM    Creatinine 1.65 (H) 2018 04:53 AM    BUN/Creatinine ratio 42 (H) 2018 04:53 AM    GFR est AA 52 (L) 2018 04:53 AM    GFR est non-AA 43 (L) 2018 04:53 AM    Calcium 7.0 (L) 2018 04:53 AM    Bilirubin, total 4.0 (H) 2018 04:53 AM    AST (SGOT) 176 (H) 2018 04:53 AM    Alk. phosphatase 79 2018 04:53 AM    Protein, total 6.0 (L) 2018 04:53 AM    Albumin 1.5 (L) 2018 04:53 AM    Globulin 4.5 (H) 2018 04:53 AM    A-G Ratio 0.3 (L) 2018 04:53 AM    ALT (SGPT) 59 2018 04:53 AM     cxr personally reviewed -bilateral air space disease     Assessment:       1. 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 2.  Multiple pulmonary cavitary lesions, likely from septic emboli. 3.  Severe mitral regurgitation. 4.  Renal insufficiency. 5.  Thrombocytopenia. 6.  Hyperbilirubinemia. 7.  Substance abuse due to cocaine and heroin.           Recommendations:     1.  repeat blood cultures on tomorrow    2. He will need 6 weeks of IV therapy, all of which needs to be as an inpatient. 3.  I do not think we need to necessarily rule him out for TB especially  if the QuantiFERON Gold is negative. The cavitary lesions can be explained by endocarditis. 4. Ultrasound of the right upper quadrant shows some pericholecystic fluid but no stones. There is some thickening. Watch for acalculous cholecystitis. Don't think the zosyn is helping the thrombocytopenia. Change this to cefepime and flagyl. Dr. Arthur Severino will see patient over the weekend.        Justine Denis MD

## 2018-05-11 NOTE — PROGRESS NOTES
1830: Patient tachypneic and using accessory muscles with RR 40s-50, lung sounds coarse/crackles. Pt c/o left anterior chest pain, unable to further assess pain characteristics d/t AMS. 1904: EKG and ABG obtained. Cardiologist at bedside. [facility] 210 paged. 1930: Orders received for high flow nasal cannula and precedex gtt from pulmonologist.      1930: Bedside and Verbal shift change report given to [facility] (oncoming nurse) by Brendon Quick RN (offgoing nurse). Report included the following information SBAR, Kardex, Intake/Output, MAR, Recent Results and Cardiac Rhythm NSR/afib.

## 2018-05-11 NOTE — PROCEDURES
Emergent Intubation  Performed by: LUIS ARMANDO Matthew  Authorized by: Les Matthew     Emergent Intubation:   Location:  ICU  Date/Time:  5/10/2018 8:25 PM  Indications:  Impending respiratory failure  Spontaneous Ventilation: present    Level of Consciousness: sedated  Preoxygenated:  Yes      Airway Documentation:   Airway:  ETT - Cuffed  Technique:  Direct laryngoscopy  Insertion Site:  Oral  Blade Type:  Glover  Blade Size:  3  ETT size (mm):  7.5  ETT Line Shayne:  Teeth  ETT Insertion depth (cm):  22  Placement verified by: auscultation, EtCO2 and BBS    Attempts:  1  Difficult airway: No

## 2018-05-11 NOTE — PROGRESS NOTES
Reason for Admission:   AMS                   RRAT Score: 4                    Plan for utilizing home health:   TBD                      Likelihood of Readmission:  moderate                         Transition of Care Plan:    TBD    Patient is s/p cardiac arrest  And is currently intubated. Patient has a PMH for Hepatitis C, COPD, HTN, who is brought to the ED with altered mental status. EMS states that patient's family member called 9-1-1 tonight because patient has been altered from his baseline mentation. Family currently is not present. Care management will follow up with family when they arrive to complete assessment.  Thea Mcclure RN,CRM

## 2018-05-11 NOTE — PROGRESS NOTES
I returned page from nurse at 20:30 hours and received report that patient had went into acute respiratory failure with O2sats in the 70s which required ETT intubation by anesthesiologist. Patient had been placed on mechanical ventilator. During call, patient reportedly had ectopy on cardiac monitor and went into asystole. Code blue was called and ACLS/ CPR was initiated. Patient was ambubag ventilated. On arrival at bedside, patient had ROSC. Patient was placed back on ventilator. Pt seen and evaluated. VS:   BP= 168/110 HR= 114  RR= on ventilator   O2sat= 92%   PE:  GEN- ill appearing male now on mechanical ventilator  PSYCH- unresponsive  NEURO-GCS 3T. HEENT-NCAT/pupils 2 mm sluggish bilateral. Oropharynx with ETT in place. NECK-supple, trachea midline  LUNGS-CTA B  HEART-RRR  ABD-soft, NT,ND, hypoactive BS. No R/G/R  VASCULAR-2+ radial/1+DP pulses bilateral. No C/C/E  SKIN-warm/dry  MS-no calf tenderness    A/P:  Cardiopulmonary arrest.  Status is critical.  Ordered stat chest xray portable, 12 lead EKG, labs. Continue mechanical ventilation. Ordered ABG.

## 2018-05-11 NOTE — PROGRESS NOTES
NUTRITION COMPLETE ASSESSMENT    RECOMMENDATIONS:   Once off Diprivan-increase tube feeding to 55 ml/hr     Interventions/Plan:   Food/Nutrient Delivery: Initiate enteral nutrition:     Osmolite 1.5 @ 50 ml/hr with one packet Prosource daily and 150 ml water flush q 4 hr    Assessment:   Reason for Assessment:   [x] Provider Consult-Tube Feeding managment    Diet: NPO  Nutritionally Significant Medications: [x] Reviewed & Includes:  Bumex, 1/2 NS @ 75 ml/hr, Diprivan  Meal Intake: No data found. Subjective:  Pt intubated. Objective:  Mr Carlos Romo was admitted with Sepsis. Noted: Staph Endocarditis (TV and MV) with septic pulmonary emboli; severe MR-needs surgery after 6 weeks antibiotics; TERRELL; encephalopathy; acute respiratory failure-intubated 5/10. PMHx: HTN, Hep C, COPD, IVDA, others noted. Patient appears malnourished-noted severe muscle wasting-clavicle, B/L temporals, loss of SQ fat-orbital region, sunken cheeks and BLE edema. Edema noted of hands also. Unclear of weight history-?recent loss. BUN, creatinine and magnesium elevated d/t renal failure. Severely low albumin d/t acute illness in addition to chronic undernutrition (poor protein intake); likely contributing to 3rd spacing edema. Plan to start enteral nutrition support discussed during interdisciplinary rounds. Diprivan @ 5.2 ml/hr which will provide 137 lipid calories per day. Suggested tube feeding: Osmolite 1.5 @ 50 ml/hr with one packet Prosource daily and 150 ml water flush q 4 hr. This will provide 1200 ml, 1860 calories (2000 calories including Diprivan), 90 gm protein and 1900 ml free water (tube feeding/flush) per day to meet % estimated protein and energy needs, respectively. Once off Diprivan-increase tube feeding to 55 ml/hr.     Estimated Nutrition Needs:   Kcals/day: 2012 Kcals/day  Protein: 97 g (1.2g/kg)  Fluid:  (1 ml/kcal)  Based On: [facility] (2003b)  Weight Used: Actual wt (81 kg)    Pt expected to meet estimated nutrient needs:  [x]   Yes via tube feeding    []  No  [] Unable to predict at this time    Nutrition Diagnosis:   1. Inadequate oral intake related to Endocarditis with septic pulmonary emboli as evidenced by NPO d/t acute respiratory failure/intubation. Meets Criteria for Acute Malnutrition   [x] Severe Malnutrition, as evidenced by:   [x] Moderate muscle wasting, loss of subcutaneous fat   [] Nutritional intake of <50% of recommended intake for >5 days   [] Weight loss of >1-2% in 1 week, >5% in 1 month, >7.5% in 3 months, or >10% in 6 months   [x] Moderate-severe edema       Goals: Tolerate tube feeding at goal in next 1-2 days. Monitoring & Evaluation:    - Enteral/parenteral nutrition intake   - Electrolyte and renal profile, Weight/weight change     Previous Nutrition Goals Met:  N/A  Previous Recommendations:      N/A    Education & Discharge Needs:   [x] None Identified   [] Identified and addressed    [x] Participated in care plan, discharge planning, and/or interdisciplinary rounds        Cultural, Sabianism and ethnic food preferences identified:   None    Skin Integrity: [x]Intact  []Other  Edema: []None [x] 2+ BLE's  Last BM: 5/11  Food Allergies: [x]None []Other    Anthropometrics:    Weight Loss Metrics 5/11/2018 11/25/2014   Today's Wt 189 lb 9.6 oz 198 lb   BMI 25.71 kg/m2 26.13 kg/m2      Last 3 Recorded Weights in this Encounter    05/10/18 0600 05/11/18 0600 05/11/18 1141   Weight: 85.5 kg (188 lb 9.6 oz) 86 kg (189 lb 9.6 oz) 86 kg (189 lb 9.6 oz)      Weight Source: Bed  Height: 6' (182.9 cm),    Body mass index is 25.71 kg/(m^2).   IBW : 80.7 kg (178 lb), % IBW (Calculated): 106.52 %   ,      Labs:    Lab Results   Component Value Date/Time    Sodium 148 (H) 05/11/2018 04:53 AM    Potassium 4.0 05/11/2018 04:53 AM    Chloride 118 (H) 05/11/2018 04:53 AM    CO2 19 (L) 05/11/2018 04:53 AM    Glucose 161 (H) 05/11/2018 04:53 AM    BUN 70 (H) 05/11/2018 04:53 AM    Creatinine 1.65 (H) 05/11/2018 04:53 AM    Calcium 7.0 (L) 05/11/2018 04:53 AM    Magnesium 3.6 (H) 05/11/2018 04:53 AM    Phosphorus 3.9 05/11/2018 04:53 AM    Albumin 1.5 (L) 05/11/2018 04:53 AM     No results found for: HBA1C, HGBE8, DCX4LPYR, EUF1FTNI  Lab Results   Component Value Date/Time    Glucose (POC) 112 (H) 05/11/2018 05:33 AM      Lab Results   Component Value Date/Time    ALT (SGPT) 59 05/11/2018 04:53 AM    AST (SGOT) 176 (H) 05/11/2018 04:53 AM    Alk.  phosphatase 79 05/11/2018 04:53 AM    Bilirubin, total 4.0 (H) 05/11/2018 04:53 AM        Dixon Horn RD [facility]

## 2018-05-11 NOTE — CONSULTS
Palliative Medicine Consult  Dontae: [phone] ([phone])    Patient Name: Laury Dow  YOB: 1960    Date of Initial Consult: 5/11/18  Reason for Consult: Care Decisions   Requesting Provider: Alfredito Ness MD  Primary Care Physician: None     SUMMARY:   Laury Dow is a 62 y.o.  [place] male  with a past history of hep c, I/V drug abuse, htn ,  who was admitted on 5/9/2018 from home  with a diagnosis of unresponsiveness and sepsis , uds positive for cocaine. 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 not  . Psychosocail:  Not ,  No children ,have eight siblings/ legal next of kin . Patient has long standing h/o drug abuse. PALLIATIVE DIAGNOSES:   1. Goals of acre  2. Encephalopathy    3. Sepsis with MRSA endocarditis(7. Long h/o I/v drug abuse0    4. Multi organ failure    5. Debility  6. John cardiac arrest , plans for temporary pacemaker . PLAN:   Met oswaldo with his brother Currie Siemens oldest of 8 sibling /next of kin , also spoke to his sister Jamee Foley, who provided me with the contact information of his siblings :    Mathieu Toussaint (sister ) # [phone]. Gurdeep Gan # [phone]  Brandan # [phone]  Volodymyr # [phone]    Дмитрий # [phone]    [facility]allito [facility] : [phone]  Michaelamparo Esthela : no contact information     Renato: no contact information     1. Introduce Palliative care service. 2. Currie Siemens shared , patient has long time h/o drug abuse close to 40 years . 3. He also reports 3 of her other siblings also have h/o drug abuse . 4.  Dr Aditi Herman was present during this meeting,  We  discussed , severity of illness, high risk of cardiac arrest  and  CPR FACTS , no meaning ful outcome of CPR,due to his severe illness with multiple system failing , Brother Robert Neff would like to discuss with hIS siblings and may come up with a decision for Dnr.    5. We discussed patient does not have a mpoa, his 8 siblings are legal next of kin , majority should be in consensus with any decision , on behalf of patient because patient does not have mental capacity to make medical decisions. 6 we agreed for tenative meeting with palliative care with 5/8 siblings on Monday at 3 pm     7 we also discussed the importance of having contact point /person for the patient for ease of communication , to avoid talking to multiple family members ,Nori Rivera is going to talk amongst  his sibling and most likely will come up with decsion for possible DNR this weekend and let the icu team know . Initial consult note routed to primary continuity provider  Communicated plan of care with: Palliative IDT, Dr Minerva Eagle , hospitalist KATHRIN Thornton and bed side RN       GOALS OF CARE / TREATMENT PREFERENCES:     GOALS OF CARE:     Currently full restorative care , tentative fm Monday 5/14 at 3 pm .    TREATMENT PREFERENCES:   Code Status: Full Code    Advance Care Planning:  No flowsheet data found. Medical Interventions: Full interventions   Other Instructions: Other:    As far as possible, the palliative care team has discussed with patient / health care proxy about goals of care / treatment preferences for patient. HISTORY:     History obtained from: chart , bed side RN and his brother Nori Rivera. CHIEF COMPLAINT: admitted for above. HPI/SUBJECTIVE:    The patient is:     [] Non-participatory due to: obtunded. Sedated , intubated, on presors, s/p cardiac arrest today.       Clinical Pain Assessment (nonverbal scale for severity on nonverbal patients):   Clinical Pain Assessment  Severity: 0     Activity (Movement): Lying quietly, normal position    Duration: for how long has pt been experiencing pain (e.g., 2 days, 1 month, years)  Frequency: how often pain is an issue (e.g., several times per day, once every few days, constant)     FUNCTIONAL ASSESSMENT:     Palliative Performance Scale (PPS):  PPS: 30       PSYCHOSOCIAL/SPIRITUAL SCREENING:     Palliative IDT has assessed this patient for cultural preferences / practices and a referral made as appropriate to needs (Cultural Services, Patient Advocacy, Ethics, etc.)    Advance Care Planning:  No flowsheet data found. Any spiritual / Methodist concerns:  [] Yes /  [] No    Caregiver Burnout:  [] Yes /  [x] No /  [] No Caregiver Present      Anticipatory grief assessment:   [] Normal  / [] Maladaptive       ESAS Anxiety:      ESAS Depression:     Unable to evaluate above because of patient condition . REVIEW OF SYSTEMS:     Positive and pertinent negative findings in ROS are noted above in HPI. The following systems were [] reviewed / [x] unable to be reviewed as noted in HPI  Other findings are noted below. Systems: constitutional, ears/nose/mouth/throat, respiratory, gastrointestinal, genitourinary, musculoskeletal, integumentary, neurologic, psychiatric, endocrine. Positive findings noted below. Modified ESAS Completed by: provider           Pain: 0     Nausea: 0     Dyspnea: 0           Stool Occurrence(s): 1        PHYSICAL EXAM:     From RN flowsheet:  Wt Readings from Last 3 Encounters:   05/12/18 196 lb 11.2 oz (89.2 kg)   05/09/18 178 lb 9.2 oz (81 kg)   11/25/14 198 lb (89.8 kg)     Blood pressure 92/64, pulse 68, temperature 98.1 °F (36.7 °C), resp. rate 26, height 6' (1.829 m), weight 196 lb 11.2 oz (89.2 kg), SpO2 98 %. Pain Scale 1: Adult Nonverbal Pain Scale  Pain Intensity 1: 0                 Last bowel movement, if known:     Constitutional: chronically sick, looks older to stated age, intubated , sedated , soft restraints.   Eyes: pupils equal, anicteric  ENMT: dry oral mucosa  Cardiovascular: regular rhythm, + edema   Respiratory: breathing not labored, symmetric  Gastrointestinal: soft non-tender, +bowel sounds  Musculoskeletal: no deformity, no tenderness to palpation  Skin: warm, dry  Neurologic: following commands, moving all extremities  Psychiatric: full affect, no hallucinations  Other:       HISTORY:     Principal Problem:    Sepsis ([facility] [address] (5/9/2018)    Active Problems:    Shock ([facility] [address] (5/9/2018)      Past Medical History:   Diagnosis Date    Hypertension     Infectious disease     Hepatitis C      Past Surgical History:   Procedure Laterality Date    HX ORTHOPAEDIC      back injury 2013      History reviewed. No pertinent family history. History reviewed, no pertinent family history.   Social History   Substance Use Topics    Smoking status: Current Every Day Smoker    Smokeless tobacco: Never Used    Alcohol use Yes      Comment: occasional     No Known Allergies   Current Facility-Administered Medications   Medication Dose Route Frequency    bumetanide (BUMEX) injection 1 mg  1 mg IntraVENous BID    propofol (DIPRIVAN) infusion  0-30 mcg/kg/min IntraVENous TITRATE    fentaNYL (PF) 900 mcg/30 ml infusion soln  0-200 mcg/hr IntraVENous TITRATE    albuterol-ipratropium (DUO-NEB) 2.5 MG-0.5 MG/3 ML  3 mL Nebulization Q6H RT    chlorhexidine (PERIDEX) 0.12 % mouthwash 15 mL  15 mL Oral BID    pantoprazole (PROTONIX) 40 mg in sodium chloride 0.9% 10 mL injection  40 mg IntraVENous DAILY    PHENYLephrine (PF)(PATSY-SYNEPHRINE) 30 mg in 0.9% sodium chloride 250 mL infusion   mcg/min IntraVENous TITRATE    acetaminophen (TYLENOL) suppository 650 mg  650 mg Rectal Q6H PRN    vancomycin (VANCOCIN) 1250 mg in  ml infusion  1,250 mg IntraVENous Q16H    0.45% sodium chloride infusion  75 mL/hr IntraVENous CONTINUOUS    DOBUTamine (DOBUTREX) 500 mg/250 mL (2,000 mcg/mL) infusion  2.5 mcg/kg/min IntraVENous TITRATE    sodium chloride (NS) flush 5-10 mL 5-10 mL IntraVENous Q8H    sodium chloride (NS) flush 5-10 mL  5-10 mL IntraVENous PRN    sodium chloride (NS) flush 5-10 mL  5-10 mL IntraVENous PRN    piperacillin-tazobactam (ZOSYN) 3.375 g in 0.9% sodium chloride (MBP/ADV) 100 mL  3.375 g IntraVENous Q8H    LORazepam (ATIVAN) injection 2 mg  2 mg IntraVENous Q4H PRN    Vancomycin Pharmacy Dosing   Other PRN    sodium chloride (NS) flush 20 mL  20 mL InterCATHeter PRN    sodium chloride (NS) flush 10 mL  10 mL InterCATHeter Q24H    sodium chloride (NS) flush 10 mL  10 mL InterCATHeter PRN    sodium chloride (NS) flush 10 mL  10 mL InterCATHeter Q8H    alteplase (CATHFLO) 1 mg in sterile water (preservative free) 1 mL injection  1 mg InterCATHeter PRN          LAB AND IMAGING FINDINGS:     Lab Results   Component Value Date/Time    WBC 28.1 (H) 05/12/2018 05:55 AM    HGB 12.1 05/12/2018 05:55 AM    PLATELET 31 (LL) 97/22/7579 05:55 AM     Lab Results   Component Value Date/Time    Sodium 150 (H) 05/12/2018 05:55 AM    Potassium 4.3 05/12/2018 05:55 AM    Chloride 120 (H) 05/12/2018 05:55 AM    CO2 23 05/12/2018 05:55 AM    BUN 76 (H) 05/12/2018 05:55 AM    Creatinine 1.77 (H) 05/12/2018 05:55 AM    Calcium 7.1 (L) 05/12/2018 05:55 AM    Magnesium 3.4 (H) 05/12/2018 05:55 AM    Phosphorus 4.6 05/12/2018 05:55 AM      Lab Results   Component Value Date/Time    AST (SGOT) 114 (H) 05/12/2018 05:55 AM    Alk.  phosphatase 73 05/12/2018 05:55 AM    Protein, total 6.2 (L) 05/12/2018 05:55 AM    Albumin 1.5 (L) 05/12/2018 05:55 AM    Globulin 4.7 (H) 05/12/2018 05:55 AM     Lab Results   Component Value Date/Time    INR 1.5 (H) 05/12/2018 05:55 AM    Prothrombin time 15.7 (H) 05/12/2018 05:55 AM    aPTT 30.7 05/10/2018 09:37 PM      No results found for: IRON, FE, TIBC, IBCT, PSAT, FERR   No results found for: PH, PCO2, PO2  No components found for: [place]   Lab Results   Component Value Date/Time     05/10/2018 06:05 AM    CK - MB 31.6 (H) 05/10/2018 06:05 AM                Total time:   Counseling / coordination time, spent as noted above:   > 50% counseling / coordination?:     Prolonged service was provided for  []30 min   []75 min in face to face time in the presence of the patient, spent as noted above. Time Start:   Time End:   Note: this can only be billed with 97206 (initial) or 88507 (follow up). If multiple start / stop times, list each separately.

## 2018-05-11 NOTE — PROGRESS NOTES
1545: TRANSFER - IN REPORT:    Verbal report received from Everette Anguiano, [address] [facility] (name) on Ko Navarrete  being received from ICU (unit) for ordered procedure      Report consisted of patients Situation, Background, Assessment and   Recommendations(SBAR). Information from the following report(s) SBAR was reviewed with the receiving nurse. Opportunity for questions and clarification was provided. Assessment completed upon patients arrival to unit and care assumed. 1630: TRANSFER - OUT REPORT:    Verbal report given to Everette Anguiano RN (name) on Ko Navarrete  being transferred to ICU (unit) for routine progression of care       Report consisted of patients Situation, Background, Assessment and   Recommendations(SBAR). Information from the following report(s) Procedure Summary and MAR was reviewed with the receiving nurse. Lines:   Triple Lumen 05/09/18 Right Subclavian (Active)   Central Line Being Utilized Yes 5/11/2018 12:00 PM   Criteria for Appropriate Use Hemodynamically unstable, requiring monitoring lines, vasopressors, or volume resuscitation 5/11/2018 12:00 PM   Site Assessment Clean, dry, & intact 5/11/2018 12:00 PM   Infiltration Assessment 0 5/11/2018 12:00 PM   Affected Extremity/Extremities Color distal to insertion site pink (or appropriate for race); Pulses palpable 5/11/2018 12:00 PM   Date of Last Dressing Change 05/11/18 5/11/2018 12:00 PM   Dressing Status Clean, dry, & intact 5/11/2018 12:00 PM   Dressing Type Disk with Chlorhexadine gluconate (CHG); Transparent 5/11/2018 12:00 PM   Action Taken Open ports on tubing capped 5/11/2018 12:00 PM   Proximal Hub Color/Line Status Yellow; Infusing 5/11/2018 12:00 PM   Positive Blood Return (Medial Site) Yes 5/11/2018 12:00 PM   Medial Hub Color/Line Status Blue; Infusing 5/11/2018 12:00 PM   Positive Blood Return (Lateral Site) Yes 5/11/2018 12:00 PM   Distal Hub Color/Line Status Brown; Infusing 5/11/2018 12:00 PM   Positive Blood Return (Site #3) Yes 5/11/2018 12:00 PM   Alcohol Cap Used Yes 5/11/2018 12:00 PM       Peripheral IV 05/09/18 Left Antecubital (Active)   Site Assessment Clean, dry, & intact 5/11/2018 12:00 PM   Phlebitis Assessment 0 5/11/2018 12:00 PM   Infiltration Assessment 0 5/11/2018 12:00 PM   Dressing Status Clean, dry, & intact 5/11/2018 12:00 PM   Dressing Type Transparent;Tape 5/11/2018 12:00 PM   Hub Color/Line Status Pink;Capped 5/11/2018 12:00 PM   Action Taken Open ports on tubing capped 5/11/2018 12:00 PM   Alcohol Cap Used Yes 5/11/2018 12:00 PM        Opportunity for questions and clarification was provided.       Patient transported with:   Monitor  O2 @ 15 liters  Registered Nurse  [facility]

## 2018-05-11 NOTE — PROGRESS NOTES
[facility]    Name: Zaida Diallo   : 1960   MRN: [id number]   Date: 2018 11:04 AM       Seen and examined earlier today. Overnight, developed sinus arrest and then subsequently has been in accelerated junctional rhythm. On dobutamine. Suspected CAD and conduction system abscess a possibility. On dobutamine and plans for temporary pacemaker noted. CXR with progressive pneumonic infiltrates and perihilar edema. All blood cultures growling MRSA. On airborne precautions per infection control and suspicion for MTB is low. Bronchoscopy not indicated and will be high risk leading to cardiac arrest with underlying AV disassociation. 5/10  Seen and examined earlier today Awakens but mumbling. Blood cultures growing staph. Echo with TV and MV vegetations. 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 Can DC airborne precautions from pulmonary standpoint, Plats low, could be zosyn. Will consult ID for long term antibiotic management. Converted to a fib this am and troponin is elevated      New pt  63 yo male with h/o IVDA, HTN, COPD, Hep C    To ED hypotensive encephalopathic  AMS per family; prodrome resp issues. .  Mumbling historian, non-focal    ED eval-  Febrile 103 with WBC 35k  Hypotensive/ tachycardic--> fluid bolus  Elevated lactate  AKD 56/2.7  UDS + cocaine/opiates  abnml chest imaging most c/w septic embolic - multiple cavitary lesions  Report: Multiple bilateral cavitary lung nodules. Differential diagnostic possibilities  include infection, including septic emboli, and tumor.         Head Ct- ? abnml-->IMPRESSION: Subtle area of hyperattenuation in the right posterior parietal  lobe. MRI would be useful for further evaluation. Alternatively, a follow-up CT  in a few hours could be performed to evaluate for interval change. A small  hemorrhage cannot be excluded and clinical correlation is recommended.       Now admitted ICU on broad spectrum abx  For neurosgy and renal eval    PMH- per limited records  COPD/HTN/HepC/ IVDA    SH smoker/ +EtOH/ + coacine/heroin    FH/ROS- unobtainable. .    Prior to Admission Medications   Prescriptions Last Dose Informant Patient Reported? Taking? buPROPion SR (WELLBUTRIN SR) 100 mg SR tablet Unknown at Unknown time  Yes No   diclofenac EC (VOLTAREN) 75 mg EC tablet Unknown at Unknown time  No No   Sig: Take 1 tablet by mouth two (2) times a day. polyethylene glycol (MIRALAX) 17 gram/dose powder Unknown at Unknown time  No No   Sig: Take 17 g by mouth daily. 1 tablespoon with 8 oz of water daily   risperiDONE (RISPERDAL) 4 mg tablet Unknown at Unknown time  Yes No   traZODone (DESYREL) 100 mg tablet Unknown at Unknown time  Yes No      Facility-Administered Medications: None         Vital Signs:    Visit Vitals    /80    Pulse 67    Temp 98.6 °F (37 °C)    Resp 22    Ht 6' (1.829 m)    Wt 86 kg (189 lb 9.6 oz)    SpO2 100%    BMI 25.71 kg/m2       O2 Device: (P) Endotracheal tube, Ventilator   O2 Flow Rate (L/min): 40 l/min   Temp (24hrs), Av.7 °F (36.5 °C), Min:96.8 °F (36 °C), Max:98.6 °F (37 °C)       Intake/Output:   Last shift:       07 - 1900  In: 175.6 [I.V.:175.6]  Out: 455 [Urine:455]  Last 3 shifts: 1901 -  0700  In: 5996.9 [I.V.:5996.9]  Out: 2750 [Urine:2750]    Intake/Output Summary (Last 24 hours) at 18 1305  Last data filed at 18 1200   Gross per 24 hour   Intake          2158.99 ml   Output             1370 ml   Net           788.99 ml       Physical Exam:    Chonically ill disheveled BM  Intubated, sedated  Mumbles incoherently  NC/AT  EOMI- muddly sclera. Reactive pupils  Moist MM- visible  dentition ok  Neck no JVD/adenpathy/ R IJ CVL  Chest scattered loose rhonchi  CV tachy S3  Abd NT- no guarding  Ext no edema  Skin dry/ LE statsis cahnges  Moves all 4 follows simple commands    DATA:   Current Facility-Administered Medications   Medication Dose Route Frequency    bumetanide (BUMEX) injection 1 mg  1 mg IntraVENous BID    propofol (DIPRIVAN) infusion  0-30 mcg/kg/min IntraVENous TITRATE    fentaNYL (PF) 900 mcg/30 ml infusion soln  0-200 mcg/hr IntraVENous TITRATE    albuterol-ipratropium (DUO-NEB) 2.5 MG-0.5 MG/3 ML  3 mL Nebulization Q6H RT    chlorhexidine (PERIDEX) 0.12 % mouthwash 15 mL  15 mL Oral BID    pantoprazole (PROTONIX) 40 mg in sodium chloride 0.9% 10 mL injection  40 mg IntraVENous DAILY    acetaminophen (TYLENOL) suppository 650 mg  650 mg Rectal Q6H PRN    vancomycin (VANCOCIN) 1250 mg in  ml infusion  1,250 mg IntraVENous Q16H    0.45% sodium chloride infusion  75 mL/hr IntraVENous CONTINUOUS    DOBUTamine (DOBUTREX) 500 mg/250 mL (2,000 mcg/mL) infusion  5 mcg/kg/min IntraVENous TITRATE    sodium chloride (NS) flush 5-10 mL  5-10 mL IntraVENous Q8H    sodium chloride (NS) flush 5-10 mL  5-10 mL IntraVENous PRN    sodium chloride (NS) flush 5-10 mL  5-10 mL IntraVENous PRN    piperacillin-tazobactam (ZOSYN) 3.375 g in 0.9% sodium chloride (MBP/ADV) 100 mL  3.375 g IntraVENous Q8H    LORazepam (ATIVAN) injection 2 mg  2 mg IntraVENous Q4H PRN    Vancomycin Pharmacy Dosing   Other PRN    sodium chloride (NS) flush 20 mL  20 mL InterCATHeter PRN    sodium chloride (NS) flush 10 mL  10 mL InterCATHeter Q24H    sodium chloride (NS) flush 10 mL  10 mL InterCATHeter PRN    sodium chloride (NS) flush 10 mL  10 mL InterCATHeter Q8H    alteplase (CATHFLO) 1 mg in sterile water (preservative free) 1 mL injection  1 mg InterCATHeter PRN       Telemetry: sinus tachy          Labs:  Recent Labs      05/11/18   0453  05/10/18   2137  05/10/18   0432   WBC  25.7*  28.1*  32.0*   HGB  12.4  12.8  11.8*   HCT  37.5  39.7  36.0*   PLT  26*  28*  32*     Recent Labs      05/11/18   0453  05/10/18   2137  05/10/18   0727  05/10/18   0432  05/09/18   0320   NA  148*  150*   -- 147*  134*   K  4.0  4.1   --   3.6  4.0   CL  118*  117*   --   117*  94*   CO2  19*  24   --   21  21   GLU  161*  148*   --   144*  104*   BUN  70*  65*   --   56*  56*   CREA  1.65*  1.57*   --   1.65*  2.78*   CA  7.0*  6.9*   --   7.0*  8.4*   MG  3.6*  3.4*   --   3.3*   --    PHOS  3.9   --    --   3.1   --    ALB  1.5*   --    --   1.5*  2.2*   SGOT  176*   --    --   260*  137*   ALT  59   --    --   53  41   INR  1.8*  1.9*  1.8*  1.8*  1.5*     ABG 7.53/24/71 room air    Imaging:  I have personally reviewed the patients radiographs and reports. IMPRESSION:   · MODS with Sepsis with clinical picture most c/w endocarditis (TV and MV vegetations) + septic pulmonary emboli: staph bacteremia, Dr. Rosario Chester following (Thanks!)  · Acute resp failure with MRSA pneumonia and pulmonary edema  · S/P bradycardic arrest with AV disassociation- possible pace maker  · Acute decompensated HFpEF (arrhythmia, MR, ischemia)  · Encephalopathy  · AKD with hematuria  · ? Subtle CNS event by initial head CT  · IVDA- cocaine/optiates  · Hep C  · \"COPD\"  · Thrombocytopenia- sepsis/zosyn   PLAN:   · VACV- settings adjusted  · On dobutamine  · Vent bundle  · Palliative Medicine consult-- MSOF, large family, goals of care  · CTS, cardiology, nephrology following  · Broad spectrum abx  · IVF--> monitor CVP/ UOP  · IVF adjusted  · MRI of head when stable- was not able to go yesterday because of airborne precautions  · Surveillance cultures  · Tube feeds  · SCDs  · Sedatives reviewed: adjusted. DC precedex in view of bradycardia and AV conduction issues  · Protonix  · May discontinue airborne precautions from pulm standpoint- this is limiting tests and procedures  · ICU protocol care           The pt is critically ill at hi risk further decompensation. See my orders for details    Total critical care time exclusive of procedures 45 minutes.     Franko Burk MD

## 2018-05-11 NOTE — PROGRESS NOTES
NAME: Juan David Beal        :  1960        MRN:  [id number]        Assessment :    Plan:  --TERRELL  Sepsis  Hypotension  Cavitary lung lesions  IVDA  AMS  Endocarditis  S/P arrest 5/10 --Creatinine worse S/P arrest..  Sodium a little better. On hypotonic fluids. Decreased UO. On vent. Critically ill. Subjective:     Chief Complaint:  On vent      Review of Systems:    Symptom Y/N Comments  Symptom Y/N Comments   Fever/Chills    Chest Pain     Poor Appetite    Edema     Cough    Abdominal Pain     Sputum    Joint Pain     SOB/BENTLEY    Pruritis/Rash     Nausea/vomit    Tolerating PT/OT     Diarrhea    Tolerating Diet     Constipation    Other       Could not obtain due to: On vent     Objective:     VITALS:   Last 24hrs VS reviewed since prior progress note. Most recent are:  Visit Vitals    BP 99/77    Pulse 71    Temp 98.3 °F (36.8 °C)    Resp 26    Ht 6' (1.829 m)    Wt 86 kg (189 lb 9.6 oz)    SpO2 100%    BMI 25.71 kg/m2       Intake/Output Summary (Last 24 hours) at 18 1114  Last data filed at 18 0900   Gross per 24 hour   Intake          2308.99 ml   Output             1240 ml   Net          1068.99 ml      Telemetry Reviewed:     PHYSICAL EXAM:  General: Intubated. On vent.   Few rhonchi  No edema      Lab Data Reviewed: (see below)    Medications Reviewed: (see below)    PMH/SH reviewed - no change compared to H&P  ________________________________________________________________________  Care Plan discussed with:  Patient     Family      RN     Care Manager                    Consultant:          Comments   >50% of visit spent in counseling and coordination of care       ________________________________________________________________________  Jeremy Tanner MD     Procedures: see electronic medical records for all procedures/Xrays and details which  were not copied into this note but were reviewed prior to creation of Plan. LABS:  Recent Labs      05/11/18   0453  05/10/18   2137   WBC  25.7*  28.1*   HGB  12.4  12.8   HCT  37.5  39.7   PLT  26*  28*     Recent Labs      05/11/18   0453  05/10/18   2137  05/10/18   0432   NA  148*  150*  147*   K  4.0  4.1  3.6   CL  118*  117*  117*   CO2  19*  24  21   BUN  70*  65*  56*   CREA  1.65*  1.57*  1.65*   GLU  161*  148*  144*   CA  7.0*  6.9*  7.0*   MG  3.6*  3.4*  3.3*   PHOS  3.9   --   3.1     Recent Labs      05/11/18   0453  05/10/18   0432  05/09/18   0320   SGOT  176*  260*  137*   AP  79  68  111   TP  6.0*  6.2*  8.4*   ALB  1.5*  1.5*  2.2*   GLOB  4.5*  4.7*  6.2*   LPSE   --    --   138     Recent Labs      05/11/18   0453  05/10/18   2137  05/10/18   0727   05/09/18   1051   INR  1.8*  1.9*  1.8*   < >   --    PTP  17.7*  18.8*  17.9*   < >   --    APTT   --   30.7  31.6   --   33.3*    < > = values in this interval not displayed. No results for input(s): FE, TIBC, PSAT, FERR in the last 72 hours. No results found for: FOL, RBCF   No results for input(s): PH, PCO2, PO2 in the last 72 hours.   Recent Labs      05/10/18   0605   CPK  303   CKMB  31.6*     No components found for: [place]  Lab Results   Component Value Date/Time    Color DARK YELLOW 05/09/2018 03:20 AM    Appearance CLOUDY (A) 05/09/2018 03:20 AM    Specific gravity 1.016 05/09/2018 03:20 AM    pH (UA) 5.5 05/09/2018 03:20 AM    Protein 30 (A) 05/09/2018 03:20 AM    Glucose NEGATIVE  05/09/2018 03:20 AM    Ketone NEGATIVE  05/09/2018 03:20 AM    Bilirubin SMALL (A) 11/25/2014 01:52 PM    Urobilinogen 1.0 05/09/2018 03:20 AM    Nitrites NEGATIVE  05/09/2018 03:20 AM    Leukocyte Esterase SMALL (A) 05/09/2018 03:20 AM    Epithelial cells FEW 05/09/2018 03:20 AM    Bacteria NEGATIVE  05/09/2018 03:20 AM    WBC 0-4 05/09/2018 03:20 AM    RBC 5-10 05/09/2018 03:20 AM       MEDICATIONS:  Current Facility-Administered Medications   Medication Dose Route Frequency    bumetanide (BUMEX) injection 1 mg  1 mg IntraVENous BID    propofol (DIPRIVAN) infusion  0-30 mcg/kg/min IntraVENous TITRATE    fentaNYL (PF) 900 mcg/30 ml infusion soln  0-200 mcg/hr IntraVENous TITRATE    albuterol-ipratropium (DUO-NEB) 2.5 MG-0.5 MG/3 ML  3 mL Nebulization Q6H RT    chlorhexidine (PERIDEX) 0.12 % mouthwash 15 mL  15 mL Oral BID    pantoprazole (PROTONIX) 40 mg in sodium chloride 0.9% 10 mL injection  40 mg IntraVENous DAILY    acetaminophen (TYLENOL) suppository 650 mg  650 mg Rectal Q6H PRN    vancomycin (VANCOCIN) 1250 mg in  ml infusion  1,250 mg IntraVENous Q16H    0.45% sodium chloride infusion  75 mL/hr IntraVENous CONTINUOUS    DOBUTamine (DOBUTREX) 500 mg/250 mL (2,000 mcg/mL) infusion  5 mcg/kg/min IntraVENous TITRATE    sodium chloride (NS) flush 5-10 mL  5-10 mL IntraVENous Q8H    sodium chloride (NS) flush 5-10 mL  5-10 mL IntraVENous PRN    sodium chloride (NS) flush 5-10 mL  5-10 mL IntraVENous PRN    piperacillin-tazobactam (ZOSYN) 3.375 g in 0.9% sodium chloride (MBP/ADV) 100 mL  3.375 g IntraVENous Q8H    LORazepam (ATIVAN) injection 2 mg  2 mg IntraVENous Q4H PRN    Vancomycin Pharmacy Dosing   Other PRN    sodium chloride (NS) flush 20 mL  20 mL InterCATHeter PRN    sodium chloride (NS) flush 10 mL  10 mL InterCATHeter Q24H    sodium chloride (NS) flush 10 mL  10 mL InterCATHeter PRN    sodium chloride (NS) flush 10 mL  10 mL InterCATHeter Q8H    alteplase (CATHFLO) 1 mg in sterile water (preservative free) 1 mL injection  1 mg InterCATHeter PRN

## 2018-05-12 NOTE — PROGRESS NOTES
Problem: Pressure Injury - Risk of  Goal: *Prevention of pressure injury  Document Darwin Scale and appropriate interventions in the flowsheet. Outcome: Progressing Towards Goal  Pressure Injury Interventions:  Sensory Interventions: Assess changes in LOC, Assess need for specialty bed, Avoid rigorous massage over bony prominences, Check visual cues for pain, Float heels, Keep linens dry and wrinkle-free, Minimize linen layers, Monitor skin under medical devices, Pad between skin to skin, Pressure redistribution bed/mattress (bed type), Turn and reposition approx. every two hours (pillows and wedges if needed), Use 30-degree side-lying position    Moisture Interventions: Absorbent underpads, Apply protective barrier, creams and emollients, Assess need for specialty bed, Check for incontinence Q2 hours and as needed, Limit adult briefs, Maintain skin hydration (lotion/cream), Minimize layers, Moisture barrier    Activity Interventions: Assess need for specialty bed, Increase time out of bed, Pressure redistribution bed/mattress(bed type)    Mobility Interventions: Float heels, HOB 30 degrees or less, Pressure redistribution bed/mattress (bed type), Turn and reposition approx. every two hours(pillow and wedges)    Nutrition Interventions: Document food/fluid/supplement intake, Discuss nutritional consult with provider    Friction and Shear Interventions: Apply protective barrier, creams and emollients, Foam dressings/transparent film/skin sealants, HOB 30 degrees or less, Lift sheet, Lift team/patient mobility team, Minimize layers, Transferring/repositioning devices               Problem: Falls - Risk of  Goal: *Absence of Falls  Document Jer Fall Risk and appropriate interventions in the flowsheet.    Outcome: Progressing Towards Goal  Fall Risk Interventions:  Mobility Interventions: Communicate number of staff needed for ambulation/transfer, PT Consult for mobility concerns, PT Consult for assist device competence, Strengthening exercises (ROM-active/passive)    Mentation Interventions: Adequate sleep, hydration, pain control, Door open when patient unattended, Evaluate medications/consider consulting pharmacy, Increase mobility, More frequent rounding, Reorient patient, Toileting rounds, Update white board    Medication Interventions: Evaluate medications/consider consulting pharmacy    Elimination Interventions: Call light in reach

## 2018-05-12 NOTE — PROGRESS NOTES
0800:  Report received from Kina Murray. 1200:  Noted pt TF residual >200. TF stopped. 1600:  Bedside shift change report given to [facility]. (oncoming nurse) by Jono Sheldon (offgoing nurse). Report included the following information SBAR, Kardex, Intake/Output and Recent Results.

## 2018-05-12 NOTE — ROUTINE PROCESS
16:00 Bedside shift change report given to MOM (oncoming nurse) by Alfredo Galvez (offgoing nurse). Report included the following information SBAR, Kardex, Procedure Summary, Intake/Output, MAR, Accordion, Recent Results, Med Rec Status, Cardiac Rhythm paced and Alarm Parameters      19:30 Bedside shift change report given to Lobo (oncoming nurse) by Sophy Perry (offgoing nurse). Report included the following information SBAR, Kardex, Procedure Summary, Intake/Output, MAR, Accordion, Recent Results, Med Rec Status, Cardiac Rhythm paced and Alarm Parameters .

## 2018-05-12 NOTE — PROGRESS NOTES
Hospitalist Progress Note  Guzman Lizama NP  Answering service: [phone] from in house phone  Cell: [phone]      Date of Service:  2018  NAME:  Jose Enrique He  :  1960  MRN:  [id number]      Admission Summary:   62year old with PMH of COPD, HTN, and Hep C with significant forty plus year drug abuse history. The patient lives in assisted living at [address] [place] [address] in an independent apartment per his brothers. He is   Normally alert and oriented but is not close to his family. He presented with altered mental status and Severe  Sepsis picture. He was admitted and we were consulted to manage these issues    Interval history / Subjective:   Family visited yesterday and still are trying to decide next course of action. He remains on José gtt and Dobutamine gtt at this time and is sedated and vented. Assessment & Plan:     Severe Sepsis with multisystem organ dysfunction in the setting of staph Endocarditis(POA)  Leukocytosis, Fever, Tachycardia, Hypotension, Elevated Lactate  Vanc, Levaquin and Zosyn since , now Vanc and Zosyn as per ID  Paired blood cultures with MRSA, UA  Chest CT with multiple cavitary lesions?or septic emboli, more likely  Lactate trended down 2.1  CT of abdomen ok, skin examined again and noted weeping to B/L LE  Echo with EF 55-60%, NRWMA, MV vegetation, TV vegetation  CT Surgery and ID following  HIV test negative    Acute Renal Failure  Renal US ok and s/s of medical renal disease  Kidney function elevated but stable at 1.77.   Marginal UO  Nephrology following    Acute Metabolic Encephalopathy  Head CT with Subtle area of hyperattenuation in the right posterior parietal  Lobe, concern for septic emboli  Brain MRI ordered but cannot get this due to temporary pacemaker  Neurology on board    Thrombocytopenia  Likely reactive but concerned for DIC vs Liver Dysfunction  Slight improvement today    Paroxysmal Atrial Fibrillation  Initially on Amiodarone but this was stopped  Coagulation contraindicated with platelet function  EKG reviewed with atrial fibrillation and inferior lead T wave abnormality, QT prolonged on 5/11 but appears sinus at present on temp pacer    Elevated Troponin, s/p cardiac arrest, Acute Diastolic Heart Failure  Likely has CAD vs demand ischemia- defer to Cards  temporary pacemaker in place  Repeat Echo with EF 55% with severe MV regurgitation  Dobutamine drip  IV Bumex    Substance Abuse  Heroine and Cocaine abuse  Family unsure of Tobacco or ETOH use    Hepatitis C  HIV negative  AST and T Bili elevated, May be playing a role in thrombocytopenia    Abnormal CT imaging  Cavitary lesions bilaterally, PPD -, AFB culture sent  Airborne precautions dc'd per ID  Pulm following and no indication for bronch at this time    Code status: Full. Palliative is working with family due to no advanced directive and multiple siblings  DVT prophylaxis: SCDs due to thrombocytopenia    Care Plan discussed with: RN, Attending  Disposition: [facility]     Hospital Problems  Date Reviewed: 5/9/2018          Codes Class Noted POA    Shock ([facility] [address] ICD-10-CM: R57.9  ICD-9-CM: 785.50  5/9/2018 Unknown        * (Principal)Sepsis ([facility] [address] ICD-10-CM: A41.9  ICD-9-CM: 038.9, 995.91  5/9/2018 Unknown                Review of Systems:   Review of systems not obtained due to patient factors. Vital Signs:    Last 24hrs VS reviewed since prior progress note.  Most recent are:  Visit Vitals    /59    Pulse 70    Temp 98.1 °F (36.7 °C)    Resp 23    Ht 6' (1.829 m)    Wt 89.2 kg (196 lb 11.2 oz)    SpO2 99%    BMI 26.68 kg/m2         Intake/Output Summary (Last 24 hours) at 05/12/18 1117  Last data filed at 05/12/18 1000   Gross per 24 hour   Intake          3923.12 ml   Output             3085 ml   Net           838.12 ml        Physical Examination:             Constitutional:  Intubated and Sedated ENT:  ETT, MM dry. NGT in place. Right SC CVC   Resp:  Vented. Coarse BS throughout. No accessory muscle use   CV:  Regular rhythm, Sinus tachy on tele, 3/6 systolic murmur    GI:  Soft, non distended, non tender. + BS    Musculoskeletal:  + Anasarca with gross edema to all extremities. Restraints in place    Neurologic:  RASS -3      Skin:  Weeping wounds to B/L LE where skin is cracking due to anasarca. Hematologic/Lymphatic/Immunlogic:  No jaundice nor lymph node swelling  :  Normal genitalia. Data Review:    Review and/or order of clinical lab test  Review and/or order of tests in the radiology section of [facility]      Labs:     Recent Labs      05/12/18 0555 05/11/18 0453   WBC  28.1*  25.7*   HGB  12.1  12.4   HCT  36.9  37.5   PLT  31*  26*     Recent Labs      05/12/18   0555  05/11/18   0453  05/10/18   2137  05/10/18   0432   NA  150*  148*  150*  147*   K  4.3  4.0  4.1  3.6   CL  120*  118*  117*  117*   CO2  23  19*  24  21   BUN  76*  70*  65*  56*   CREA  1.77*  1.65*  1.57*  1.65*   GLU  177*  161*  148*  144*   CA  7.1*  7.0*  6.9*  7.0*   MG  3.4*  3.6*  3.4*  3.3*   PHOS  4.6  3.9   --   3.1     Recent Labs      05/12/18   0555  05/11/18   0453  05/10/18   0432   SGOT  114*  176*  260*   ALT  53  59  53   AP  73  79  68   TBILI  3.7*  4.0*  4.1*   TP  6.2*  6.0*  6.2*   ALB  1.5*  1.5*  1.5*   GLOB  4.7*  4.5*  4.7*     Recent Labs      05/12/18   0555  05/11/18   0453  05/10/18   2137  05/10/18   0727   INR  1.5*  1.8*  1.9*  1.8*   PTP  15.7*  17.7*  18.8*  17.9*   APTT   --    --   30.7  31.6      No results for input(s): FE, TIBC, PSAT, FERR in the last 72 hours. No results found for: FOL, RBCF   No results for input(s): PH, PCO2, PO2 in the last 72 hours.   Recent Labs      05/10/18   0605   CPK  303   CKNDX  10.4*   TROIQ  19.30*     Lab Results   Component Value Date/Time    Cholesterol, total <50 05/10/2018 04:32 AM    HDL Cholesterol 9 05/10/2018 04:32 AM    LDL, calculated 05/10/2018 04:32 AM     Unable to calculate LDL or VLDL due to low cholesterol.     Triglyceride 173 (H) 05/10/2018 04:32 AM    CHOL/HDL Ratio Cannot be calculated 05/10/2018 04:32 AM     Lab Results   Component Value Date/Time    Glucose (POC) 112 (H) 05/11/2018 05:33 AM    Glucose (POC) 157 (H) 05/09/2018 12:51 PM    Glucose (POC) 96 05/09/2018 05:45 AM    Glucose (POC) 110 (H) 05/09/2018 05:03 AM     Lab Results   Component Value Date/Time    Color DARK YELLOW 05/09/2018 03:20 AM    Appearance CLOUDY (A) 05/09/2018 03:20 AM    Specific gravity 1.016 05/09/2018 03:20 AM    pH (UA) 5.5 05/09/2018 03:20 AM    Protein 30 (A) 05/09/2018 03:20 AM    Glucose NEGATIVE  05/09/2018 03:20 AM    Ketone NEGATIVE  05/09/2018 03:20 AM    Bilirubin SMALL (A) 11/25/2014 01:52 PM    Urobilinogen 1.0 05/09/2018 03:20 AM    Nitrites NEGATIVE  05/09/2018 03:20 AM    Leukocyte Esterase SMALL (A) 05/09/2018 03:20 AM    Epithelial cells FEW 05/09/2018 03:20 AM    Bacteria NEGATIVE  05/09/2018 03:20 AM    WBC 0-4 05/09/2018 03:20 AM    RBC 5-10 05/09/2018 03:20 AM         Medications Reviewed:     Current Facility-Administered Medications   Medication Dose Route Frequency    [START ON 5/13/2018] vancomycin trough on 5/13 @ 01:00   Other ONCE    bumetanide (BUMEX) injection 1 mg  1 mg IntraVENous BID    propofol (DIPRIVAN) infusion  0-30 mcg/kg/min IntraVENous TITRATE    fentaNYL (PF) 900 mcg/30 ml infusion soln  0-200 mcg/hr IntraVENous TITRATE    albuterol-ipratropium (DUO-NEB) 2.5 MG-0.5 MG/3 ML  3 mL Nebulization Q6H RT    chlorhexidine (PERIDEX) 0.12 % mouthwash 15 mL  15 mL Oral BID    pantoprazole (PROTONIX) 40 mg in sodium chloride 0.9% 10 mL injection  40 mg IntraVENous DAILY    PHENYLephrine (PF)(PATSY-SYNEPHRINE) 30 mg in 0.9% sodium chloride 250 mL infusion   mcg/min IntraVENous TITRATE    acetaminophen (TYLENOL) suppository 650 mg  650 mg Rectal Q6H PRN    vancomycin (VANCOCIN) 1250 mg in  ml infusion 1,250 mg IntraVENous Q16H    0.45% sodium chloride infusion  75 mL/hr IntraVENous CONTINUOUS    DOBUTamine (DOBUTREX) 500 mg/250 mL (2,000 mcg/mL) infusion  2.5 mcg/kg/min IntraVENous TITRATE    sodium chloride (NS) flush 5-10 mL  5-10 mL IntraVENous Q8H    sodium chloride (NS) flush 5-10 mL  5-10 mL IntraVENous PRN    sodium chloride (NS) flush 5-10 mL  5-10 mL IntraVENous PRN    piperacillin-tazobactam (ZOSYN) 3.375 g in 0.9% sodium chloride (MBP/ADV) 100 mL  3.375 g IntraVENous Q8H    LORazepam (ATIVAN) injection 2 mg  2 mg IntraVENous Q4H PRN    Vancomycin Pharmacy Dosing   Other PRN    sodium chloride (NS) flush 20 mL  20 mL InterCATHeter PRN    sodium chloride (NS) flush 10 mL  10 mL InterCATHeter Q24H    sodium chloride (NS) flush 10 mL  10 mL InterCATHeter PRN    sodium chloride (NS) flush 10 mL  10 mL InterCATHeter Q8H    alteplase (CATHFLO) 1 mg in sterile water (preservative free) 1 mL injection  1 mg InterCATHeter PRN     ______________________________________________________________________  EXPECTED LENGTH OF STAY: 4d 21h  ACTUAL LENGTH OF STAY:          3                 Erin Salgado NP

## 2018-05-12 NOTE — PROGRESS NOTES
NAME: Raoul Rhodes        :  1960        MRN:  [id number]        Assessment :    Plan:  --TERRELL- ATN due to sepsis/arrest  Hypernatremia  Sepsis  Hypotension  Cavitary lung lesions  IVDA  AMS  Endocarditis  S/P lobo arrest 5/10- s/p TVP on  -UOP marginal. Cr slowly trending up  Na more worse: switch 1/2 NS>>D5W at 75 ml//hr  Ct ABx  Ct IV Bumex  Pressors as needed    On vent. Critically ill. Poor prognosis       Subjective:     Chief Complaint:  On vent      Review of Systems:     Could not obtain due to: On vent     Objective:     VITALS:   Last 24hrs VS reviewed since prior progress note. Most recent are:  Visit Vitals    /59    Pulse 70    Temp 98.1 °F (36.7 °C)    Resp 23    Ht 6' (1.829 m)    Wt 89.2 kg (196 lb 11.2 oz)    SpO2 99%    BMI 26.68 kg/m2       Intake/Output Summary (Last 24 hours) at 18 1150  Last data filed at 18 1000   Gross per 24 hour   Intake          3923.12 ml   Output             3085 ml   Net           838.12 ml      Telemetry Reviewed:     PHYSICAL EXAM:  General: Intubated. On vent. Dec BS at bases  RRR, not lobo at present  Tr to 1+ edema  Venous stasis changes+  Sedated on vent      Lab Data Reviewed: (see below)    Medications Reviewed: (see below)    PMH/ reviewed - no change compared to H&P  ________________________________________________________________________  Care Plan discussed with:  Patient     Family      RN     Care Manager                    Consultant:          Comments   >50% of visit spent in counseling and coordination of care       ________________________________________________________________________  Sherida Cowden, MD     Procedures: see electronic medical records for all procedures/Xrays and details which  were not copied into this note but were reviewed prior to creation of Plan.       LABS:  Recent Labs      18   0577 05/11/18 0453   WBC  28.1*  25.7*   HGB  12.1  12.4   HCT  36.9  37.5   PLT  31*  26*     Recent Labs      05/12/18   0555  05/11/18   0453  05/10/18   2137  05/10/18   0432   NA  150*  148*  150*  147*   K  4.3  4.0  4.1  3.6   CL  120*  118*  117*  117*   CO2  23  19*  24  21   BUN  76*  70*  65*  56*   CREA  1.77*  1.65*  1.57*  1.65*   GLU  177*  161*  148*  144*   CA  7.1*  7.0*  6.9*  7.0*   MG  3.4*  3.6*  3.4*  3.3*   PHOS  4.6  3.9   --   3.1     Recent Labs      05/12/18   0555  05/11/18   0453  05/10/18   0432   SGOT  114*  176*  260*   AP  73  79  68   TP  6.2*  6.0*  6.2*   ALB  1.5*  1.5*  1.5*   GLOB  4.7*  4.5*  4.7*     Recent Labs      05/12/18   0555  05/11/18   0453  05/10/18   2137  05/10/18   0727   INR  1.5*  1.8*  1.9*  1.8*   PTP  15.7*  17.7*  18.8*  17.9*   APTT   --    --   30.7  31.6      No results for input(s): FE, TIBC, PSAT, FERR in the last 72 hours. No results found for: FOL, RBCF   No results for input(s): PH, PCO2, PO2 in the last 72 hours.   Recent Labs      05/10/18   0605   CPK  303   CKMB  31.6*     No components found for: [place]  Lab Results   Component Value Date/Time    Color DARK YELLOW 05/09/2018 03:20 AM    Appearance CLOUDY (A) 05/09/2018 03:20 AM    Specific gravity 1.016 05/09/2018 03:20 AM    pH (UA) 5.5 05/09/2018 03:20 AM    Protein 30 (A) 05/09/2018 03:20 AM    Glucose NEGATIVE  05/09/2018 03:20 AM    Ketone NEGATIVE  05/09/2018 03:20 AM    Bilirubin SMALL (A) 11/25/2014 01:52 PM    Urobilinogen 1.0 05/09/2018 03:20 AM    Nitrites NEGATIVE  05/09/2018 03:20 AM    Leukocyte Esterase SMALL (A) 05/09/2018 03:20 AM    Epithelial cells FEW 05/09/2018 03:20 AM    Bacteria NEGATIVE  05/09/2018 03:20 AM    WBC 0-4 05/09/2018 03:20 AM    RBC 5-10 05/09/2018 03:20 AM       MEDICATIONS:  Current Facility-Administered Medications   Medication Dose Route Frequency    [START ON 5/13/2018] vancomycin trough on 5/13 @ 01:00   Other ONCE    dextrose 5% infusion  75 mL/hr IntraVENous CONTINUOUS    bumetanide (BUMEX) injection 1 mg  1 mg IntraVENous BID    propofol (DIPRIVAN) infusion  0-30 mcg/kg/min IntraVENous TITRATE    fentaNYL (PF) 900 mcg/30 ml infusion soln  0-200 mcg/hr IntraVENous TITRATE    albuterol-ipratropium (DUO-NEB) 2.5 MG-0.5 MG/3 ML  3 mL Nebulization Q6H RT    chlorhexidine (PERIDEX) 0.12 % mouthwash 15 mL  15 mL Oral BID    pantoprazole (PROTONIX) 40 mg in sodium chloride 0.9% 10 mL injection  40 mg IntraVENous DAILY    PHENYLephrine (PF)(PATSY-SYNEPHRINE) 30 mg in 0.9% sodium chloride 250 mL infusion   mcg/min IntraVENous TITRATE    acetaminophen (TYLENOL) suppository 650 mg  650 mg Rectal Q6H PRN    vancomycin (VANCOCIN) 1250 mg in  ml infusion  1,250 mg IntraVENous Q16H    DOBUTamine (DOBUTREX) 500 mg/250 mL (2,000 mcg/mL) infusion  2.5 mcg/kg/min IntraVENous TITRATE    sodium chloride (NS) flush 5-10 mL  5-10 mL IntraVENous Q8H    sodium chloride (NS) flush 5-10 mL  5-10 mL IntraVENous PRN    sodium chloride (NS) flush 5-10 mL  5-10 mL IntraVENous PRN    piperacillin-tazobactam (ZOSYN) 3.375 g in 0.9% sodium chloride (MBP/ADV) 100 mL  3.375 g IntraVENous Q8H    LORazepam (ATIVAN) injection 2 mg  2 mg IntraVENous Q4H PRN    Vancomycin Pharmacy Dosing   Other PRN    sodium chloride (NS) flush 20 mL  20 mL InterCATHeter PRN    sodium chloride (NS) flush 10 mL  10 mL InterCATHeter Q24H    sodium chloride (NS) flush 10 mL  10 mL InterCATHeter PRN    sodium chloride (NS) flush 10 mL  10 mL InterCATHeter Q8H    alteplase (CATHFLO) 1 mg in sterile water (preservative free) 1 mL injection  1 mg InterCATHeter PRN

## 2018-05-12 NOTE — PROGRESS NOTES
[facility]    Name: Danish Rowland   : 1960   MRN: [id number]   Date: 2018 11:04 AM     Patient was seen and examined earlier today. He had temporary transvenous pacemaker placed a femoral venous access and is now in the Critical Care Unit. Mechanical ventilation settings were reviewed. He appears to be poor. He now cannot travel for MRI because of presence of transvenous pacemaker. Perhaps CT scan of the head will be considered tomorrow to assess for interval change. The oldest brother appears to be taking responsibility for medical decisions and goals of care but is to talk to other siblings to make sure there is a consensus agreement. Prognosis for long-term recovery appears to be poor.   Seen and examined earlier today. Overnight, developed sinus arrest and then subsequently has been in accelerated junctional rhythm. On dobutamine. Suspected CAD and conduction system abscess a possibility. On dobutamine and plans for temporary pacemaker noted. CXR with progressive pneumonic infiltrates and perihilar edema. All blood cultures growling MRSA. On airborne precautions per infection control and suspicion for MTB is low. Bronchoscopy not indicated and will be high risk leading to cardiac arrest with underlying AV disassociation. 5/10  Seen and examined earlier today Awakens but mumbling. Blood cultures growing staph. Echo with TV and MV vegetations. 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 Can DC airborne precautions from pulmonary standpoint, Plats low, could be zosyn. Will consult ID for long term antibiotic management. Converted to a fib this am and troponin is elevated      New pt  63 yo male with h/o IVDA, HTN, COPD, Hep C    To ED hypotensive encephalopathic  AMS per family; prodrome resp issues. .  Mumbling historian, non-focal    ED eval-  Febrile 103 with WBC 35k  Hypotensive/ tachycardic--> fluid bolus  Elevated lactate  AKD 56/2.7  UDS + cocaine/opiates  abnml chest imaging most c/w septic embolic - multiple cavitary lesions  Report: Multiple bilateral cavitary lung nodules. Differential diagnostic possibilities  include infection, including septic emboli, and tumor.         Head Ct- ? abnml-->IMPRESSION: Subtle area of hyperattenuation in the right posterior parietal  lobe. MRI would be useful for further evaluation. Alternatively, a follow-up CT  in a few hours could be performed to evaluate for interval change. A small  hemorrhage cannot be excluded and clinical correlation is recommended. Now admitted ICU on broad spectrum abx  For neurosgy and renal eval    PMH- per limited records  COPD/HTN/HepC/ IVDA    SH smoker/ +EtOH/ + coacine/heroin    FH/ROS- unobtainable. .    Prior to Admission Medications   Prescriptions Last Dose Informant Patient Reported? Taking? buPROPion SR (WELLBUTRIN SR) 100 mg SR tablet Unknown at Unknown time  Yes No   diclofenac EC (VOLTAREN) 75 mg EC tablet Unknown at Unknown time  No No   Sig: Take 1 tablet by mouth two (2) times a day. polyethylene glycol (MIRALAX) 17 gram/dose powder Unknown at Unknown time  No No   Sig: Take 17 g by mouth daily.  1 tablespoon with 8 oz of water daily   risperiDONE (RISPERDAL) 4 mg tablet Unknown at Unknown time  Yes No   traZODone (DESYREL) 100 mg tablet Unknown at Unknown time  Yes No      Facility-Administered Medications: None         Vital Signs:    Visit Vitals    BP 95/61    Pulse 71    Temp 99 °F (37.2 °C)    Resp 21    Ht 6' (1.829 m)    Wt 89.2 kg (196 lb 11.2 oz)    SpO2 100%    BMI 26.68 kg/m2       O2 Device: Endotracheal tube   O2 Flow Rate (L/min): 40 l/min   Temp (24hrs), Av °F (36.7 °C), Min:97.3 °F (36.3 °C), Max:99 °F (37.2 °C)       Intake/Output:   Last shift:       07 - 1900  In: 9999 [I.V.:1425]  Out: 1325 [Urine:1325]  Last 3 shifts: 05/10 1901 -  0700  In: 4992.9 [I.V.:4762.9]  Out: 3240 [Urine:3240]    Intake/Output Summary (Last 24 hours) at 05/12/18 1525  Last data filed at 05/12/18 1400   Gross per 24 hour   Intake          3876.62 ml   Output             3360 ml   Net           516.62 ml       Physical Exam:    Chonically ill disheveled BM  Intubated, sedated  Mumbles incoherently  NC/AT  EOMI- muddly sclera. Reactive pupils  Moist MM- visible  dentition ok  Neck no JVD/adenpathy/ R IJ CVL  Chest scattered loose rhonchi  CV tachy S3  Abd NT- no guarding  Ext no edema  Skin dry/ LE statsis cahnges  Moves all 4 follows simple commands    DATA:   Current Facility-Administered Medications   Medication Dose Route Frequency    [START ON 5/13/2018] vancomycin trough on 5/13 @ 01:00   Other ONCE    dextrose 5% infusion  75 mL/hr IntraVENous CONTINUOUS    bumetanide (BUMEX) injection 1 mg  1 mg IntraVENous BID    propofol (DIPRIVAN) infusion  0-30 mcg/kg/min IntraVENous TITRATE    fentaNYL (PF) 900 mcg/30 ml infusion soln  0-200 mcg/hr IntraVENous TITRATE    albuterol-ipratropium (DUO-NEB) 2.5 MG-0.5 MG/3 ML  3 mL Nebulization Q6H RT    chlorhexidine (PERIDEX) 0.12 % mouthwash 15 mL  15 mL Oral BID    pantoprazole (PROTONIX) 40 mg in sodium chloride 0.9% 10 mL injection  40 mg IntraVENous DAILY    PHENYLephrine (PF)(PATSY-SYNEPHRINE) 30 mg in 0.9% sodium chloride 250 mL infusion   mcg/min IntraVENous TITRATE    acetaminophen (TYLENOL) suppository 650 mg  650 mg Rectal Q6H PRN    vancomycin (VANCOCIN) 1250 mg in  ml infusion  1,250 mg IntraVENous Q16H    DOBUTamine (DOBUTREX) 500 mg/250 mL (2,000 mcg/mL) infusion  2.5 mcg/kg/min IntraVENous TITRATE    sodium chloride (NS) flush 5-10 mL  5-10 mL IntraVENous Q8H    sodium chloride (NS) flush 5-10 mL  5-10 mL IntraVENous PRN    sodium chloride (NS) flush 5-10 mL  5-10 mL IntraVENous PRN    piperacillin-tazobactam (ZOSYN) 3.375 g in 0.9% sodium chloride (MBP/ADV) 100 mL  3.375 g IntraVENous Q8H    LORazepam (ATIVAN) injection 2 mg  2 mg IntraVENous Q4H PRN    Vancomycin Pharmacy Dosing   Other PRN    sodium chloride (NS) flush 20 mL  20 mL InterCATHeter PRN    sodium chloride (NS) flush 10 mL  10 mL InterCATHeter Q24H    sodium chloride (NS) flush 10 mL  10 mL InterCATHeter PRN    sodium chloride (NS) flush 10 mL  10 mL InterCATHeter Q8H    alteplase (CATHFLO) 1 mg in sterile water (preservative free) 1 mL injection  1 mg InterCATHeter PRN       Telemetry: sinus tachy          Labs:  Recent Labs      05/12/18   0555  05/11/18   0453  05/10/18   2137   WBC  28.1*  25.7*  28.1*   HGB  12.1  12.4  12.8   HCT  36.9  37.5  39.7   PLT  31*  26*  28*     Recent Labs      05/12/18   0555  05/11/18   0453  05/10/18   2137   05/10/18   0432   NA  150*  148*  150*   --   147*   K  4.3  4.0  4.1   --   3.6   CL  120*  118*  117*   --   117*   CO2  23  19*  24   --   21   GLU  177*  161*  148*   --   144*   BUN  76*  70*  65*   --   56*   CREA  1.77*  1.65*  1.57*   --   1.65*   CA  7.1*  7.0*  6.9*   --   7.0*   MG  3.4*  3.6*  3.4*   --   3.3*   PHOS  4.6  3.9   --    --   3.1   ALB  1.5*  1.5*   --    --   1.5*   SGOT  114*  176*   --    --   260*   ALT  53  59   --    --   53   INR  1.5*  1.8*  1.9*   < >  1.8*    < > = values in this interval not displayed. ABG 7.53/24/71 room air    Imaging:  I have personally reviewed the patients radiographs and reports. IMPRESSION:   · MODS with Sepsis with clinical picture most c/w endocarditis (TV and MV vegetations) + septic pulmonary emboli: staph bacteremia, Dr. Aspen Ac following (Thanks!)  · Acute resp failure with MRSA pneumonia and pulmonary edema  · S/P bradycardic arrest with AV disassociation- possible pace maker  · Acute decompensated HFpEF (arrhythmia, MR, ischemia)  · Encephalopathy  · AKD with hematuria  · ?  Subtle CNS event by initial head CT  · IVDA- cocaine/optiates  · Hep C  · \"COPD\"  · Thrombocytopenia- sepsis/zosyn   PLAN:   · VACV- settings adjusted  · On dobutamine  · Vent bundle  · Palliative Medicine consult-- MSOF, large family, goals of care  · CTS, cardiology, nephrology following  · Broad spectrum abx  · IVF--> monitor CVP/ UOP  · IVF adjusted  · MRI of head when stable- was not able to go yesterday because of airborne precautions  · Surveillance cultures  · Tube feeds  · SCDs  · Sedatives reviewed: adjusted. DC precedex in view of bradycardia and AV conduction issues  · Protonix  ·  rhythm management per cardiology  ·  labs and x-rays ordered for tomorrow,  · ICU protocol care           The pt is critically ill at hi risk further decompensation. See my orders for details    Total critical care time exclusive of procedures 35 minutes.     Rudy Seymour MD

## 2018-05-12 NOTE — PROGRESS NOTES
Cardiology Progress Note                                        Admit Date: 5/9/2018    Assessment/Plan:     Bradycardia; on temp pacer, working well  SBE; both MV/TV  PAF; no recurrence    Tiffanie Javier is a 62 y.o. male with     PROBLEM LIST:  Patient Active Problem List    Diagnosis Date Noted    Shock ([facility] [address] 05/09/2018    Sepsis ([facility] 05/09/2018         Subjective: Tiffanie Javier intubated    Visit Vitals    BP 92/53 (BP 1 Location: Right arm, BP Patient Position: At rest)    Pulse 69    Temp 98.1 °F (36.7 °C)    Resp 22    Ht 6' (1.829 m)    Wt 89.2 kg (196 lb 11.2 oz)    SpO2 98%    BMI 26.68 kg/m2       Intake/Output Summary (Last 24 hours) at 05/12/18 0821  Last data filed at 05/12/18 0700   Gross per 24 hour   Intake          3293.08 ml   Output             2585 ml   Net           708.08 ml       Objective:      Physical Exam:  HEENT: Perrla, EOMI  Neck: No JVD,  No thyroidmegaly  Resp: CTA bilaterally;  No wheezes or rales  CV: irregular  s1s2 No new murmur no s3  Abd:Soft, Nontender  Ext: No edema  Neuro: Alert and oriented; Nonfocal  Skin: Warm, Dry, Intact  Pulses: 2+ DP/PT/Rad      Telemetry: normal sinus rhythm, V paced    Current Facility-Administered Medications   Medication Dose Route Frequency    bumetanide (BUMEX) injection 1 mg  1 mg IntraVENous BID    propofol (DIPRIVAN) infusion  0-30 mcg/kg/min IntraVENous TITRATE    fentaNYL (PF) 900 mcg/30 ml infusion soln  0-200 mcg/hr IntraVENous TITRATE    albuterol-ipratropium (DUO-NEB) 2.5 MG-0.5 MG/3 ML  3 mL Nebulization Q6H RT    chlorhexidine (PERIDEX) 0.12 % mouthwash 15 mL  15 mL Oral BID    pantoprazole (PROTONIX) 40 mg in sodium chloride 0.9% 10 mL injection  40 mg IntraVENous DAILY    PHENYLephrine (PF)(PATSY-SYNEPHRINE) 30 mg in 0.9% sodium chloride 250 mL infusion   mcg/min IntraVENous TITRATE    acetaminophen (TYLENOL) suppository 650 mg  650 mg Rectal Q6H PRN    vancomycin (VANCOCIN) 1250 mg in  ml infusion  1,250 mg IntraVENous Q16H    0.45% sodium chloride infusion  75 mL/hr IntraVENous CONTINUOUS    DOBUTamine (DOBUTREX) 500 mg/250 mL (2,000 mcg/mL) infusion  2.5 mcg/kg/min IntraVENous TITRATE    sodium chloride (NS) flush 5-10 mL  5-10 mL IntraVENous Q8H    sodium chloride (NS) flush 5-10 mL  5-10 mL IntraVENous PRN    sodium chloride (NS) flush 5-10 mL  5-10 mL IntraVENous PRN    piperacillin-tazobactam (ZOSYN) 3.375 g in 0.9% sodium chloride (MBP/ADV) 100 mL  3.375 g IntraVENous Q8H    LORazepam (ATIVAN) injection 2 mg  2 mg IntraVENous Q4H PRN    Vancomycin Pharmacy Dosing   Other PRN    sodium chloride (NS) flush 20 mL  20 mL InterCATHeter PRN    sodium chloride (NS) flush 10 mL  10 mL InterCATHeter Q24H    sodium chloride (NS) flush 10 mL  10 mL InterCATHeter PRN    sodium chloride (NS) flush 10 mL  10 mL InterCATHeter Q8H    alteplase (CATHFLO) 1 mg in sterile water (preservative free) 1 mL injection  1 mg InterCATHeter PRN         Data Review:   Labs:    Recent Results (from the past 24 hour(s))   POC G3 - PUL    Collection Time: 05/11/18  9:26 AM   Result Value Ref Range    FIO2 (POC) 60 %    pH (POC) 7.459 (H) 7.35 - 7.45      pCO2 (POC) 29.3 (L) 35.0 - 45.0 MMHG    pO2 (POC) 248 (H) 80 - 100 MMHG    HCO3 (POC) 20.8 (L) 22 - 26 MMOL/L    sO2 (POC) 100 (H) 92 - 97 %    Base deficit (POC) 3 mmol/L    Site RIGHT RADIAL      Device: VENT      Mode ASSIST CONTROL      Tidal volume 500 ml    Set Rate 20 bpm    PEEP/CPAP (POC) 5 cmH2O    Allens test (POC) YES      Specimen type (POC) ARTERIAL      Total resp.  rate 26      Volume control YES     CULTURE, RESPIRATORY/SPUTUM/BRONCH W GRAM STAIN    Collection Time: 05/11/18 11:16 AM   Result Value Ref Range    Special Requests: NO SPECIAL REQUESTS      GRAM STAIN FEW WBCS SEEN      GRAM STAIN FEW GRAM POSITIVE COCCI      Culture result: PENDING    EKG, 12 LEAD, INITIAL    Collection Time: 05/11/18  3:07 PM   Result Value Ref Range    Ventricular Rate 114 BPM    Atrial Rate 220 BPM    QRS Duration 98 ms    Q-T Interval 370 ms    QTC Calculation (Bezet) 509 ms    Calculated R Axis 42 degrees    Calculated T Axis -4 degrees    Diagnosis       Atrial fibrillation with rapid ventricular response  Low voltage QRS  Nonspecific ST abnormality , probably digitalis effect  When compared with ECG of 11-MAY-2018 04:56,  Atrial fibrillation has replaced Junctional rhythm  Vent. rate has increased BY  46 BPM  Confirmed by Unique Hill MD, Bianca Addison ([id number]) on 5/11/2018 8:32:15 PM     METABOLIC PANEL, COMPREHENSIVE    Collection Time: 05/12/18  5:55 AM   Result Value Ref Range    Sodium 150 (H) 136 - 145 mmol/L    Potassium 4.3 3.5 - 5.1 mmol/L    Chloride 120 (H) 97 - 108 mmol/L    CO2 23 21 - 32 mmol/L    Anion gap 7 5 - 15 mmol/L    Glucose 177 (H) 65 - 100 mg/dL    BUN 76 (H) 6 - 20 MG/DL    Creatinine 1.77 (H) 0.70 - 1.30 MG/DL    BUN/Creatinine ratio 43 (H) 12 - 20      GFR est AA 48 (L) >60 ml/min/1.73m2    GFR est non-AA 40 (L) >60 ml/min/1.73m2    Calcium 7.1 (L) 8.5 - 10.1 MG/DL    Bilirubin, total 3.7 (H) 0.2 - 1.0 MG/DL    ALT (SGPT) 53 12 - 78 U/L    AST (SGOT) 114 (H) 15 - 37 U/L    Alk.  phosphatase 73 45 - 117 U/L    Protein, total 6.2 (L) 6.4 - 8.2 g/dL    Albumin 1.5 (L) 3.5 - 5.0 g/dL    Globulin 4.7 (H) 2.0 - 4.0 g/dL    A-G Ratio 0.3 (L) 1.1 - 2.2     MAGNESIUM    Collection Time: 05/12/18  5:55 AM   Result Value Ref Range    Magnesium 3.4 (H) 1.6 - 2.4 mg/dL   PHOSPHORUS    Collection Time: 05/12/18  5:55 AM   Result Value Ref Range    Phosphorus 4.6 2.6 - 4.7 MG/DL   LACTIC ACID    Collection Time: 05/12/18  5:55 AM   Result Value Ref Range    Lactic acid 1.6 0.4 - 2.0 MMOL/L   CBC WITH AUTOMATED DIFF    Collection Time: 05/12/18  5:55 AM   Result Value Ref Range    WBC 28.1 (H) 4.1 - 11.1 K/uL    RBC 4.43 4.10 - 5.70 M/uL    HGB 12.1 12.1 - 17.0 g/dL    HCT 36.9 36.6 - 50.3 %    MCV 83.3 80.0 - 99.0 FL    MCH 27.3 26.0 - 34.0 PG    MCHC 32.8 30.0 - 36.5 g/dL    RDW 16.3 (H) 11.5 - 14.5 %    PLATELET 31 (LL) 706 - 400 K/uL    MPV ABNORMAL 8.9 - 12.9 FL    NRBC 0.2 (H) 0  WBC    ABSOLUTE NRBC 0.06 (H) 0.00 - 0.01 K/uL    NEUTROPHILS 93 (H) 32 - 75 %    BAND NEUTROPHILS 3 0 - 6 %    LYMPHOCYTES 2 (L) 12 - 49 %    MONOCYTES 1 (L) 5 - 13 %    EOSINOPHILS 0 0 - 7 %    BASOPHILS 0 0 - 1 %    METAMYELOCYTES 1 (H) 0 %    IMMATURE GRANULOCYTES 0 %    ABS. NEUTROPHILS 27.0 (H) 1.8 - 8.0 K/UL    ABS. LYMPHOCYTES 0.6 (L) 0.8 - 3.5 K/UL    ABS. MONOCYTES 0.3 0.0 - 1.0 K/UL    ABS. EOSINOPHILS 0.0 0.0 - 0.4 K/UL    ABS. BASOPHILS 0.0 0.0 - 0.1 K/UL    ABS. IMM.  GRANS. 0.0 K/UL    DF MANUAL      PLATELET COMMENTS Large Platelets      RBC COMMENTS ANISOCYTOSIS  1+        RBC COMMENTS AGUSTÍN CELLS  PRESENT        WBC COMMENTS TOXIC GRANULATION     PROTHROMBIN TIME + INR    Collection Time: 05/12/18  5:55 AM   Result Value Ref Range    INR 1.5 (H) 0.9 - 1.1      Prothrombin time 15.7 (H) 9.0 - 11.1 sec

## 2018-05-12 NOTE — PROGRESS NOTES
0730: Bedside and Verbal shift change report given to Rafaela RN (oncoming nurse) by Adriana Navarro RN (offgoing nurse). Report included the following information SBAR, Kardex, Intake/Output, MAR, Recent Results and Cardiac Rhythm accelerated junctional rhythm. 1900: TRANSFER - OUT REPORT:    Verbal report given to Everton Oswald RN(name) on Colleen Prieto  being transferred to CCU(unit) for routine progression of care       Report consisted of patients Situation, Background, Assessment and   Recommendations(SBAR). Information from the following report(s) SBAR, Kardex, Procedure Summary, Intake/Output, MAR, Recent Results and Cardiac Rhythm paced was reviewed with the receiving nurse. Lines:   Triple Lumen 05/09/18 Right Subclavian (Active)   Central Line Being Utilized Yes 5/11/2018  4:00 PM   Criteria for Appropriate Use Hemodynamically unstable, requiring monitoring lines, vasopressors, or volume resuscitation 5/11/2018  4:00 PM   Site Assessment Clean, dry, & intact 5/11/2018  4:00 PM   Infiltration Assessment 0 5/11/2018  4:00 PM   Affected Extremity/Extremities Color distal to insertion site pink (or appropriate for race); Pulses palpable 5/11/2018  4:00 PM   Date of Last Dressing Change 05/11/18 5/11/2018  4:00 PM   Dressing Status Clean, dry, & intact 5/11/2018  4:00 PM   Dressing Type Transparent;Disk with Chlorhexadine gluconate (CHG) 5/11/2018  4:00 PM   Action Taken Open ports on tubing capped 5/11/2018  4:00 PM   Proximal Hub Color/Line Status Yellow; Infusing 5/11/2018 12:00 PM   Positive Blood Return (Medial Site) Yes 5/11/2018 12:00 PM   Medial Hub Color/Line Status Blue; Infusing 5/11/2018 12:00 PM   Positive Blood Return (Lateral Site) Yes 5/11/2018 12:00 PM   Distal Hub Color/Line Status Brown; Infusing 5/11/2018 12:00 PM   Positive Blood Return (Site #3) Yes 5/11/2018 12:00 PM   Alcohol Cap Used Yes 5/11/2018 12:00 PM       Peripheral IV 05/09/18 Left Antecubital (Active)   Site Assessment Clean, dry, & intact 5/11/2018  4:00 PM   Phlebitis Assessment 0 5/11/2018  4:00 PM   Infiltration Assessment 0 5/11/2018  4:00 PM   Dressing Status Clean, dry, & intact 5/11/2018  4:00 PM   Dressing Type Transparent;Tape 5/11/2018  4:00 PM   Hub Color/Line Status Pink;Capped 5/11/2018  4:00 PM   Action Taken Open ports on tubing capped 5/11/2018  4:00 PM   Alcohol Cap Used Yes 5/11/2018  4:00 PM        Opportunity for questions and clarification was provided.       Patient transported with:   Monitor  Registered Nurse  Tech

## 2018-05-13 NOTE — PROGRESS NOTES
Problem: Pressure Injury - Risk of  Goal: *Prevention of pressure injury  Document Darwin Scale and appropriate interventions in the flowsheet. Outcome: Progressing Towards Goal  Pressure Injury Interventions:  Sensory Interventions: Assess changes in LOC, Assess need for specialty bed, Float heels, Suspension boots, Turn and reposition approx. every two hours (pillows and wedges if needed)    Moisture Interventions: Absorbent underpads, Assess need for specialty bed, Check for incontinence Q2 hours and as needed, Limit adult briefs, Moisture barrier    Activity Interventions: Assess need for specialty bed, Increase time out of bed, PT/OT evaluation, Pressure redistribution bed/mattress(bed type)    Mobility Interventions: Assess need for specialty bed, Float heels, Suspension boots, Turn and reposition approx. every two hours(pillow and wedges)    Nutrition Interventions: Document food/fluid/supplement intake, Discuss nutritional consult with provider    Friction and Shear Interventions: Feet elevated on foot rest, Lift sheet               Problem: Falls - Risk of  Goal: *Absence of Falls  Document Jer Fall Risk and appropriate interventions in the flowsheet.    Outcome: Progressing Towards Goal  Fall Risk Interventions:  Mobility Interventions: Communicate number of staff needed for ambulation/transfer    Mentation Interventions: Door open when patient unattended, Reorient patient, Update white board    Medication Interventions: Assess postural VS orthostatic hypotension, Evaluate medications/consider consulting pharmacy, Patient to call before getting OOB, Teach patient to arise slowly    Elimination Interventions: Call light in reach, Patient to call for help with toileting needs

## 2018-05-13 NOTE — PROGRESS NOTES
Pharmacist Note - Vancomycin Dosing  Therapy day 5  Indication:  Sepsis secondary to endocarditis (mitral & tricuspid valve) with MRSA bacteremia and possible septic pulmonary emboli  Current regimen:  1250 mg IV Q 16 hr    A Trough Level resulted at 20.7 mcg/mL which was obtained 16 hrs post-dose. Goal trough: 15 - 20 mcg/mL     Plan: Change to vancomycin 1250 mg IV Q18h . Pharmacy will continue to monitor this patient daily for changes in clinical status and renal function.

## 2018-05-13 NOTE — PROGRESS NOTES
Infectious Diseases Progress Note          Subjective:     Sedated on vent    Objective:     Vitals:   Visit Vitals    BP 94/65    Pulse 69    Temp 98.2 °F (36.8 °C)    Resp 16    Ht 6' (1.829 m)    Wt 89.2 kg (196 lb 11.2 oz)    SpO2 100%    BMI 26.68 kg/m2        Tmax:  Temp (24hrs), Av.1 °F (36.7 °C), Min:97.3 °F (36.3 °C), Max:99 °F (37.2 °C)      Exam:  General appearance: no distress  Lungs: bilateral rhonchi  Heart: regular rate and rhythm; 2-1/0 apical systolic murmur  Abdomen: soft, non-tender. Bowel sounds normal.     IV Lines: right SC CVL    Labs:    Recent Labs      18   0555  18   0453  05/10/18   2137  05/10/18   0432   WBC  28.1*  25.7*  28.1*  32.0*   HGB  12.1  12.4  12.8  11.8*   PLT  31*  26*  28*  32*   BUN  76*  70*  65*  56*   CREA  1.77*  1.65*  1.57*  1.65*   TBILI  3.7*  4.0*   --   4.1*   SGOT  114*  176*   --   260*   AP  73  79   --   68       Assessment:      1.  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  Multiple pulmonary cavitary lesions -- septic emboli. 3.  Renal insufficiency    4.  Thrombocytopenia    5.  Hyperbilirubinemia    6.  Substance abuse -- cocaine and heroin. Plan:     1.  Continue antibiotics    Ki Moreno MD

## 2018-05-13 NOTE — PROGRESS NOTES
0730 Received report from Vannesa Seasl RN, assumed care of the pt. Pt. Ventilated and appears to be resting comfortably. 0930 pt restless and attempting to pull on lines. Propofol restarted at 20 mcg/kg/min    1000 Gastric residual 65. Tube feed restarted. 1400  Pt tolerating tube feed. Tube feed rate increased to 30ml/hr. [address], NP notified of pt.'s tube feed tolerance. Orders received. [address] [facility],[address], NP made aware of pt.'s BP 97/56 (65) on 100 mcg/min of José and 2.5 Dobutamine. Orders received to titrate Dobutamine. 1825 Tube feed rate increased to 40.    1930 Bedside and Verbal shift change report given to Delmis Amos RN (oncoming nurse) by Joslyn Barrett RN (offgoing nurse). Report included the following information SBAR, Kardex, ED Summary, Procedure Summary, Intake/Output, MAR, Recent Results and Cardiac Rhythm V Paced.

## 2018-05-13 NOTE — PROGRESS NOTES
Cardiology Progress Note                                        Admit Date: 5/9/2018    Assessment/Plan:     PAF; last night; now reverted back to sinus with IV Amiodarone  SBE; WBC is still high and blood cultures are positive  S/p temp pacer; working fine    Ren Bustamante is a 62 y.o. male with       PROBLEM LIST:  Patient Active Problem List    Diagnosis Date Noted    Shock ([facility] [address] 05/09/2018    Sepsis ([facility] [address] 05/09/2018         Subjective: Ren Bustamante reports none.     Visit Vitals    BP 93/65    Pulse 71    Temp 97.3 °F (36.3 °C)    Resp 20    Ht 6' (1.829 m)    Wt 87 kg (191 lb 12.8 oz)    SpO2 99%    BMI 26.01 kg/m2       Intake/Output Summary (Last 24 hours) at 05/13/18 0642  Last data filed at 05/13/18 0400   Gross per 24 hour   Intake          3764.99 ml   Output             4075 ml   Net          -310.01 ml       Objective:      Physical Exam:  HEENT:   Neck: No JVD,  No thyroidmegaly  Resp:  rales  CV: irregular  s1s2 No new murmur no s3  Abd:Soft, Nontender  Ext: No edema  Neuro:sedated  Skin: Warm, Dry, Intact  Pulses: 2+ DP/PT/Rad      Telemetry: normal sinus rhythm, V paced beats    Current Facility-Administered Medications   Medication Dose Route Frequency    vancomycin (VANCOCIN) 1250 mg in  ml infusion  1,250 mg IntraVENous Q18H    vancomycin trough on 5/13 @ 01:00   Other ONCE    dextrose 5% infusion  75 mL/hr IntraVENous CONTINUOUS    amiodarone (CORDARONE) 375 mg/250 mL D5W infusion  0.5-1 mg/min IntraVENous TITRATE    bumetanide (BUMEX) injection 1 mg  1 mg IntraVENous BID    propofol (DIPRIVAN) infusion  0-30 mcg/kg/min IntraVENous TITRATE    fentaNYL (PF) 900 mcg/30 ml infusion soln  0-200 mcg/hr IntraVENous TITRATE    albuterol-ipratropium (DUO-NEB) 2.5 MG-0.5 MG/3 ML  3 mL Nebulization Q6H RT    chlorhexidine (PERIDEX) 0.12 % mouthwash 15 mL  15 mL Oral BID    pantoprazole (PROTONIX) 40 mg in sodium chloride 0.9% 10 mL injection  40 mg IntraVENous DAILY  PHENYLephrine (PF)(PATSY-SYNEPHRINE) 30 mg in 0.9% sodium chloride 250 mL infusion   mcg/min IntraVENous TITRATE    acetaminophen (TYLENOL) suppository 650 mg  650 mg Rectal Q6H PRN    DOBUTamine (DOBUTREX) 500 mg/250 mL (2,000 mcg/mL) infusion  2.5 mcg/kg/min IntraVENous TITRATE    sodium chloride (NS) flush 5-10 mL  5-10 mL IntraVENous Q8H    sodium chloride (NS) flush 5-10 mL  5-10 mL IntraVENous PRN    sodium chloride (NS) flush 5-10 mL  5-10 mL IntraVENous PRN    piperacillin-tazobactam (ZOSYN) 3.375 g in 0.9% sodium chloride (MBP/ADV) 100 mL  3.375 g IntraVENous Q8H    LORazepam (ATIVAN) injection 2 mg  2 mg IntraVENous Q4H PRN    Vancomycin Pharmacy Dosing   Other PRN    sodium chloride (NS) flush 20 mL  20 mL InterCATHeter PRN    sodium chloride (NS) flush 10 mL  10 mL InterCATHeter Q24H    sodium chloride (NS) flush 10 mL  10 mL InterCATHeter PRN    sodium chloride (NS) flush 10 mL  10 mL InterCATHeter Q8H    alteplase (CATHFLO) 1 mg in sterile water (preservative free) 1 mL injection  1 mg InterCATHeter PRN         Data Review:   Labs:    Recent Results (from the past 24 hour(s))   CULTURE, BLOOD    Collection Time: 05/12/18  9:15 AM   Result Value Ref Range    Special Requests: NO SPECIAL REQUESTS      Culture result: (A)       GRAM POSITIVE COCCI IN CLUSTERS GROWING IN 1 OF 2 BOTTLES DRAWN ( L ARM SITE)    Culture result: (A)       PRELIMINARY REPORT OF GRAM POSITIVE COCCI IN CLUSTERS GROWING IN 1 OF 2 BOTTLES DRAWN CALLED TO AND READ BACK BY MS CHARLA YUNG ([address]) ON 5/13/18 AT 0018.  [facility]    Culture result: REMAINING BOTTLE(S) HAS/HAVE NO GROWTH SO FAR     VANCOMYCIN, TROUGH    Collection Time: 05/13/18 12:53 AM   Result Value Ref Range    Vancomycin,trough 20.7 (HH) 5.0 - 10.0 ug/mL    Reported dose date: NOTP     Reported dose time: NOTP     Reported dose: NOTP UNITS   CBC WITH AUTOMATED DIFF    Collection Time: 05/13/18  5:17 AM   Result Value Ref Range    WBC 25.1 (H) 4.1 - 11.1 K/uL    RBC 4.22 4.10 - 5.70 M/uL    HGB 11.5 (L) 12.1 - 17.0 g/dL    HCT 36.0 (L) 36.6 - 50.3 %    MCV 85.3 80.0 - 99.0 FL    MCH 27.3 26.0 - 34.0 PG    MCHC 31.9 30.0 - 36.5 g/dL    RDW 16.6 (H) 11.5 - 14.5 %    PLATELET 32 (LL) 704 - 400 K/uL    NRBC 0.2 (H) 0  WBC    ABSOLUTE NRBC 0.06 (H) 0.00 - 0.01 K/uL    NEUTROPHILS PENDING %    LYMPHOCYTES PENDING %    MONOCYTES PENDING %    EOSINOPHILS PENDING %    BASOPHILS PENDING %    IMMATURE GRANULOCYTES PENDING %    ABS. NEUTROPHILS PENDING K/UL    ABS. LYMPHOCYTES PENDING K/UL    ABS. MONOCYTES PENDING K/UL    ABS. EOSINOPHILS PENDING K/UL    ABS. BASOPHILS PENDING K/UL    ABS. IMM. GRANS. PENDING K/UL    DF PENDING    METABOLIC PANEL, COMPREHENSIVE    Collection Time: 05/13/18  5:17 AM   Result Value Ref Range    Sodium 149 (H) 136 - 145 mmol/L    Potassium 4.4 3.5 - 5.1 mmol/L    Chloride 117 (H) 97 - 108 mmol/L    CO2 23 21 - 32 mmol/L    Anion gap 9 5 - 15 mmol/L    Glucose 180 (H) 65 - 100 mg/dL    BUN 63 (H) 6 - 20 MG/DL    Creatinine 1.73 (H) 0.70 - 1.30 MG/DL    BUN/Creatinine ratio 36 (H) 12 - 20      GFR est AA 49 (L) >60 ml/min/1.73m2    GFR est non-AA 41 (L) >60 ml/min/1.73m2    Calcium 7.0 (L) 8.5 - 10.1 MG/DL    Bilirubin, total 2.8 (H) 0.2 - 1.0 MG/DL    ALT (SGPT) 62 12 - 78 U/L    AST (SGOT) 143 (H) 15 - 37 U/L    Alk.  phosphatase 59 45 - 117 U/L    Protein, total 5.9 (L) 6.4 - 8.2 g/dL    Albumin 1.4 (L) 3.5 - 5.0 g/dL    Globulin 4.5 (H) 2.0 - 4.0 g/dL    A-G Ratio 0.3 (L) 1.1 - 2.2     MAGNESIUM    Collection Time: 05/13/18  5:17 AM   Result Value Ref Range    Magnesium 2.7 (H) 1.6 - 2.4 mg/dL   PHOSPHORUS    Collection Time: 05/13/18  5:17 AM   Result Value Ref Range    Phosphorus 3.9 2.6 - 4.7 MG/DL   PROTHROMBIN TIME + INR    Collection Time: 05/13/18  5:17 AM   Result Value Ref Range    INR 1.4 (H) 0.9 - 1.1      Prothrombin time 14.5 (H) 9.0 - 11.1 sec

## 2018-05-13 NOTE — PROGRESS NOTES
14:30 - 15:00 (30)    Endocarditis (mitral valve and tricuspid valve; MRSA)  Severe MR and severe TR  Sepsis  Encephalopathy  Acute renal injury  Thrombocytopenia  Acute liver injury  Hep C  IVDA (cocaine, heroin)  Hypoxic respiratory failure  Paroxysmal A-fib  Complete heart block (temporary V wire)  Recent stroke (septic emboli)     Continues on vent    Continues on Abx's (Vanc / Zosyn)    Likely has CAD - will need [facility] if surgery planned    Working with palliative care - family meeting Monday     Propofol for sedation    TF's started    Severe leukocytosis persists    Hct stable    Severe thrombocytopenia    Creatinine persistently elevated    Bilirubin and other LFTs improving    Lactic acid normal     Blood cultures remain positive     Gas exchange looks good on ABG    Remains on Dobutamine and neosynephrine    CXR - patchy opacities persist      Intake/Output Summary (Last 24 hours) at 05/13/18 1449  Last data filed at 05/13/18 1400   Gross per 24 hour   Intake          3010.08 ml   Output             4275 ml   Net         -1264.92 ml     Lactic acid - 1.6      Neuro - intubated and sedated; propofol    Cardiovascular - severe MR, severe TR; BP 90/60's; HR 70's    Pulmonary - pulmonary infiltrates (septic emboli);  Vent - PEEP 5, PS 10, TV's 500's, FiO2 40%; ABG 7.46 / 29 / 248 / 21 / -3    Renal - acute kidney injury; BUN 63, creatinine 1.73; Bumex 1 mg IV BID    GI - acute liver injury; , ALT 62, alk phos 59, bilirubin 2.8    Heme - thrombocytopenia; Hct 36, platelets 32    ID - WBC 25; blood cultures - MRSA; Vanc / Zosyn    F/E - BUN 62, creatinine 1.73    M/S - moves extremities although not purposeful    Endocrine - no acute issues    Psych - unable to assess    Prophylaxis - protonix     Risk of morbidity and mortality - high  Medical decision making - high complexity    Total critical care time - 30 minutes (CPT 73204)

## 2018-05-13 NOTE — PROGRESS NOTES
I was paged as on-call provider at [phone] for new consult. I called back a few min later and d/w Tea pt's bedside RN. Chart reviewed as we were speaking. Pt was seen by our team (provider was Dr. Ismael Cisneros) on Friday, 05/11/2018. Please refer to her note. Per the initial consult note, there is a family meeting scheduled for Monday, 05/14/2018, @ 1500. I relayed to RN of the above. I confirmed that there was no sx mgmt requiring our assistance at the present time as well as no immediate nursing concerns. We will continue to follow along with you. Should you have any questions or concerns prior to our next visit at the bedside, please do not hesitate to contact us at [phone] Thank you.      Pablo Graves MD  Palliative Care Team

## 2018-05-13 NOTE — PROGRESS NOTES
NAME: Donaldo Howard        :  1960        MRN:  [id number]        Assessment :    Plan:  --TERRELL- ATN due to sepsis/arrest  Hypernatremia  Sepsis  Cavitary lung lesions  IVDA  AMS  Endocarditis/severe MR and TR  S/P lobo arrest 5/10- s/p TVP on  -Cr marginally better today  IV D5W switched to free water flushes via NG, as started on TF (150 ml every 4 hrs)  Ct ABx  Ct IV Bumex  Pressors as needed    On vent. Critically ill. Poor prognosis       Subjective:     Chief Complaint:  On vent  ; sedated    Review of Systems:     Could not obtain due to: On vent     Objective:     VITALS:   Last 24hrs VS reviewed since prior progress note. Most recent are:  Visit Vitals    /65    Pulse 76    Temp (!) 100.6 °F (38.1 °C)    Resp 21    Ht 6' (1.829 m)    Wt 87 kg (191 lb 12.8 oz)    SpO2 99%    BMI 26.01 kg/m2       Intake/Output Summary (Last 24 hours) at 18 1535  Last data filed at 18 1500   Gross per 24 hour   Intake          2806.04 ml   Output             4500 ml   Net         -1693.96 ml      Telemetry Reviewed:     PHYSICAL EXAM:  General: Intubated. On vent. Dec BS at bases  RRR, not lobo at present  1+ edema  Venous stasis changes+  Sedated on vent      Lab Data Reviewed: (see below)    Medications Reviewed: (see below)    PMH/SH reviewed - no change compared to H&P  ________________________________________________________________________  Care Plan discussed with:  Patient     Family      RN y    Care Manager                    Consultant:          Comments   >50% of visit spent in counseling and coordination of care       ________________________________________________________________________  Nanine Prim, MD     Procedures: see electronic medical records for all procedures/Xrays and details which  were not copied into this note but were reviewed prior to creation of Plan. LABS:  Recent Labs      05/13/18 0517 05/12/18 0555   WBC  25.1*  28.1*   HGB  11.5*  12.1   HCT  36.0*  36.9   PLT  32*  31*     Recent Labs      05/13/18 0517 05/12/18 0555 05/11/18   0453   NA  149*  150*  148*   K  4.4  4.3  4.0   CL  117*  120*  118*   CO2  23  23  19*   BUN  63*  76*  70*   CREA  1.73*  1.77*  1.65*   GLU  180*  177*  161*   CA  7.0*  7.1*  7.0*   MG  2.7*  3.4*  3.6*   PHOS  3.9  4.6  3.9     Recent Labs      05/13/18 0517 05/12/18 0555 05/11/18 0453   SGOT  143*  114*  176*   AP  59  73  79   TP  5.9*  6.2*  6.0*   ALB  1.4*  1.5*  1.5*   GLOB  4.5*  4.7*  4.5*     Recent Labs      05/13/18 0517 05/12/18 0555 05/11/18   0453  05/10/18   2137   INR  1.4*  1.5*  1.8*  1.9*   PTP  14.5*  15.7*  17.7*  18.8*   APTT   --    --    --   30.7      No results for input(s): FE, TIBC, PSAT, FERR in the last 72 hours. No results found for: FOL, RBCF   No results for input(s): PH, PCO2, PO2 in the last 72 hours. No results for input(s): CPK, CKMB in the last 72 hours.     No lab exists for component: TROPONINI  No components found for: [facility]  Lab Results   Component Value Date/Time    Color DARK YELLOW 05/09/2018 03:20 AM    Appearance CLOUDY (A) 05/09/2018 03:20 AM    Specific gravity 1.016 05/09/2018 03:20 AM    pH (UA) 5.5 05/09/2018 03:20 AM    Protein 30 (A) 05/09/2018 03:20 AM    Glucose NEGATIVE  05/09/2018 03:20 AM    Ketone NEGATIVE  05/09/2018 03:20 AM    Bilirubin SMALL (A) 11/25/2014 01:52 PM    Urobilinogen 1.0 05/09/2018 03:20 AM    Nitrites NEGATIVE  05/09/2018 03:20 AM    Leukocyte Esterase SMALL (A) 05/09/2018 03:20 AM    Epithelial cells FEW 05/09/2018 03:20 AM    Bacteria NEGATIVE  05/09/2018 03:20 AM    WBC 0-4 05/09/2018 03:20 AM    RBC 5-10 05/09/2018 03:20 AM       MEDICATIONS:  Current Facility-Administered Medications   Medication Dose Route Frequency    vancomycin (VANCOCIN) 1250 mg in  ml infusion  1,250 mg IntraVENous Q18H    amiodarone (CORDARONE) 375 mg/250 mL D5W infusion  0.5-1 mg/min IntraVENous TITRATE    bumetanide (BUMEX) injection 1 mg  1 mg IntraVENous BID    propofol (DIPRIVAN) infusion  0-30 mcg/kg/min IntraVENous TITRATE    fentaNYL (PF) 900 mcg/30 ml infusion soln  0-200 mcg/hr IntraVENous TITRATE    albuterol-ipratropium (DUO-NEB) 2.5 MG-0.5 MG/3 ML  3 mL Nebulization Q6H RT    chlorhexidine (PERIDEX) 0.12 % mouthwash 15 mL  15 mL Oral BID    pantoprazole (PROTONIX) 40 mg in sodium chloride 0.9% 10 mL injection  40 mg IntraVENous DAILY    PHENYLephrine (PF)(PATSY-SYNEPHRINE) 30 mg in 0.9% sodium chloride 250 mL infusion   mcg/min IntraVENous TITRATE    acetaminophen (TYLENOL) suppository 650 mg  650 mg Rectal Q6H PRN    DOBUTamine (DOBUTREX) 500 mg/250 mL (2,000 mcg/mL) infusion  2.5 mcg/kg/min IntraVENous TITRATE    sodium chloride (NS) flush 5-10 mL  5-10 mL IntraVENous Q8H    sodium chloride (NS) flush 5-10 mL  5-10 mL IntraVENous PRN    sodium chloride (NS) flush 5-10 mL  5-10 mL IntraVENous PRN    piperacillin-tazobactam (ZOSYN) 3.375 g in 0.9% sodium chloride (MBP/ADV) 100 mL  3.375 g IntraVENous Q8H    LORazepam (ATIVAN) injection 2 mg  2 mg IntraVENous Q4H PRN    Vancomycin Pharmacy Dosing   Other PRN    sodium chloride (NS) flush 20 mL  20 mL InterCATHeter PRN    sodium chloride (NS) flush 10 mL  10 mL InterCATHeter Q24H    sodium chloride (NS) flush 10 mL  10 mL InterCATHeter PRN    sodium chloride (NS) flush 10 mL  10 mL InterCATHeter Q8H    alteplase (CATHFLO) 1 mg in sterile water (preservative free) 1 mL injection  1 mg InterCATHeter PRN

## 2018-05-13 NOTE — PROGRESS NOTES
Hospitalist Progress Note  Steve Landrum NP  Answering service: [phone] from in house phone  Cell: [phone]      Date of Service:  2018  NAME:  Maria M Jacob  :  1960  MRN:  [id number]      Admission Summary:   62year old with PMH of COPD, HTN, and Hep C with significant forty plus year drug abuse history. The patient lives in assisted living at [address] [place] [address] in an independent apartment per his brothers. He is   Normally alert and oriented but is not close to his family. He presented with altered mental status and Severe  Sepsis picture. He was admitted and we were consulted to manage these issues    Interval history / Subjective:   Sedation weaned this AM for several hours, patient is extremely agitated and attempting to pull lines out, no definitive command following. He remains on José gtt , amio and Dobutamine gtt at this time and is sedated and vented.      Assessment & Plan:     Severe Sepsis with multisystem organ dysfunction in the setting of staph Endocarditis(POA)  Leukocytosis, Fever, Tachycardia, Hypotension, Elevated Lactate  Vanc, Levaquin and Zosyn since , now Vanc and Zosyn as per ID  Paired blood cultures with MRSA, UA  Chest CT with multiple cavitary lesions?or septic emboli, more likely  Lactate trended down 2.1  CT of abdomen ok, skin examined again and noted weeping to B/L LE  Echo with EF 55-60%, NRWMA, MV vegetation, TV vegetation  CT Surgery and ID following  HIV test negative    Acute Renal Failure  Renal US ok and s/s of medical renal disease  Kidney function elevated but stable at 1.73  Marginal UO  Nephrology following    Acute Metabolic Encephalopathy  Head CT with Subtle area of hyperattenuation in the right posterior parietal  Lobe, concern for septic emboli  Brain MRI ordered but cannot get this due to temporary pacemaker  Neurology on board    Thrombocytopenia  Likely reactive but concerned for DIC vs Liver Dysfunction  Slight improvement today    Paroxysmal Atrial Fibrillation  Converted to NSR on 5/13 on Amio drip   Coagulation contraindicated with platelet function  EKG reviewed with atrial fibrillation and inferior lead T wave abnormality, QT prolonged on 5/11 but appears sinus at present on temp pacer    Elevated Troponin, s/p cardiac arrest, Acute Diastolic Heart Failure  Likely has CAD vs demand ischemia- defer to Cards  temporary pacemaker in place  Repeat Echo with EF 55% with severe MV regurgitation  Dobutamine drip  IV Bumex    Substance Abuse  Heroine and Cocaine abuse  Family unsure of Tobacco or ETOH use    Hepatitis C  HIV negative  AST and T Bili elevated, May be playing a role in thrombocytopenia    Abnormal CT imaging  Cavitary lesions bilaterally, PPD -, AFB culture sent  Airborne precautions dc'd per ID  Pulm following and no indication for bronch at this time    Code status: Full. Palliative is working with family due to no advanced directive and multiple siblings> Meeting set for Monday 5/14     DVT prophylaxis: SCDs due to thrombocytopenia    Care Plan discussed with: RN, Attending  Disposition: TBD     Hospital Problems  Date Reviewed: 5/9/2018          Codes Class Noted POA    Shock ([facility] [address] ICD-10-CM: R57.9  ICD-9-CM: 785.50  5/9/2018 Unknown        * (Principal)Sepsis ([facility] [address] ICD-10-CM: A41.9  ICD-9-CM: 038.9, 995.91  5/9/2018 Unknown                Review of Systems:   Review of systems not obtained due to patient factors. Vital Signs:    Last 24hrs VS reviewed since prior progress note.  Most recent are:  Visit Vitals    BP (!) 80/62    Pulse 69    Temp (!) 100.6 °F (38.1 °C)    Resp 20    Ht 6' (1.829 m)    Wt 87 kg (191 lb 12.8 oz)    SpO2 99%    BMI 26.01 kg/m2         Intake/Output Summary (Last 24 hours) at 05/13/18 1408  Last data filed at 05/13/18 1400   Gross per 24 hour   Intake          3010.08 ml   Output             4275 ml   Net -1264.92 ml        Physical Examination:             Constitutional:  Intubated and Sedated   ENT:  ETT, MM dry. NGT in place. Right SC CVC   Resp:  Vented. Coarse BS throughout. No accessory muscle use   CV:  Regular rhythm, Sinus tachy on tele, 3/6 systolic murmur    GI:  Soft, non distended, non tender. + BS    Musculoskeletal:  + Anasarca with gross edema to all extremities. Restraints in place    Neurologic:  RASS -3      Skin:  Weeping wounds to B/L LE where skin is cracking due to anasarca. Hematologic/Lymphatic/Immunlogic:  No jaundice nor lymph node swelling  :  Normal genitalia. Data Review:    Review and/or order of clinical lab test  Review and/or order of tests in the radiology section of [facility]      Labs:     Recent Labs      05/13/18 0517 05/12/18 0555   WBC  25.1*  28.1*   HGB  11.5*  12.1   HCT  36.0*  36.9   PLT  32*  31*     Recent Labs      05/13/18 0517 05/12/18 0555 05/11/18 0453   NA  149*  150*  148*   K  4.4  4.3  4.0   CL  117*  120*  118*   CO2  23  23  19*   BUN  63*  76*  70*   CREA  1.73*  1.77*  1.65*   GLU  180*  177*  161*   CA  7.0*  7.1*  7.0*   MG  2.7*  3.4*  3.6*   PHOS  3.9  4.6  3.9     Recent Labs      05/13/18 0517 05/12/18 0555 05/11/18 0453   SGOT  143*  114*  176*   ALT  62  53  59   AP  59  73  79   TBILI  2.8*  3.7*  4.0*   TP  5.9*  6.2*  6.0*   ALB  1.4*  1.5*  1.5*   GLOB  4.5*  4.7*  4.5*     Recent Labs      05/13/18 0517 05/12/18   0555  05/11/18 0453  05/10/18   2137   INR  1.4*  1.5*  1.8*  1.9*   PTP  14.5*  15.7*  17.7*  18.8*   APTT   --    --    --   30.7      No results for input(s): FE, TIBC, PSAT, FERR in the last 72 hours. No results found for: FOL, RBCF   No results for input(s): PH, PCO2, PO2 in the last 72 hours. No results for input(s): CPK, CKNDX, TROIQ in the last 72 hours.     No lab exists for component: CPKMB  Lab Results   Component Value Date/Time    Cholesterol, total <50 05/10/2018 04:32 AM    HDL Cholesterol 9 05/10/2018 04:32 AM    LDL, calculated  05/10/2018 04:32 AM     Unable to calculate LDL or VLDL due to low cholesterol.     Triglyceride 173 (H) 05/10/2018 04:32 AM    CHOL/HDL Ratio Cannot be calculated 05/10/2018 04:32 AM     Lab Results   Component Value Date/Time    Glucose (POC) 112 (H) 05/11/2018 05:33 AM    Glucose (POC) 157 (H) 05/09/2018 12:51 PM    Glucose (POC) 96 05/09/2018 05:45 AM    Glucose (POC) 110 (H) 05/09/2018 05:03 AM     Lab Results   Component Value Date/Time    Color DARK YELLOW 05/09/2018 03:20 AM    Appearance CLOUDY (A) 05/09/2018 03:20 AM    Specific gravity 1.016 05/09/2018 03:20 AM    pH (UA) 5.5 05/09/2018 03:20 AM    Protein 30 (A) 05/09/2018 03:20 AM    Glucose NEGATIVE  05/09/2018 03:20 AM    Ketone NEGATIVE  05/09/2018 03:20 AM    Bilirubin SMALL (A) 11/25/2014 01:52 PM    Urobilinogen 1.0 05/09/2018 03:20 AM    Nitrites NEGATIVE  05/09/2018 03:20 AM    Leukocyte Esterase SMALL (A) 05/09/2018 03:20 AM    Epithelial cells FEW 05/09/2018 03:20 AM    Bacteria NEGATIVE  05/09/2018 03:20 AM    WBC 0-4 05/09/2018 03:20 AM    RBC 5-10 05/09/2018 03:20 AM         Medications Reviewed:     Current Facility-Administered Medications   Medication Dose Route Frequency    vancomycin (VANCOCIN) 1250 mg in  ml infusion  1,250 mg IntraVENous Q18H    dextrose 5% infusion  75 mL/hr IntraVENous CONTINUOUS    amiodarone (CORDARONE) 375 mg/250 mL D5W infusion  0.5-1 mg/min IntraVENous TITRATE    bumetanide (BUMEX) injection 1 mg  1 mg IntraVENous BID    propofol (DIPRIVAN) infusion  0-30 mcg/kg/min IntraVENous TITRATE    fentaNYL (PF) 900 mcg/30 ml infusion soln  0-200 mcg/hr IntraVENous TITRATE    albuterol-ipratropium (DUO-NEB) 2.5 MG-0.5 MG/3 ML  3 mL Nebulization Q6H RT    chlorhexidine (PERIDEX) 0.12 % mouthwash 15 mL  15 mL Oral BID    pantoprazole (PROTONIX) 40 mg in sodium chloride 0.9% 10 mL injection  40 mg IntraVENous DAILY    PHENYLephrine (PF)(PATSY-SYNEPHRINE) 30 mg in 0.9% sodium chloride 250 mL infusion   mcg/min IntraVENous TITRATE    acetaminophen (TYLENOL) suppository 650 mg  650 mg Rectal Q6H PRN    DOBUTamine (DOBUTREX) 500 mg/250 mL (2,000 mcg/mL) infusion  2.5 mcg/kg/min IntraVENous TITRATE    sodium chloride (NS) flush 5-10 mL  5-10 mL IntraVENous Q8H    sodium chloride (NS) flush 5-10 mL  5-10 mL IntraVENous PRN    sodium chloride (NS) flush 5-10 mL  5-10 mL IntraVENous PRN    piperacillin-tazobactam (ZOSYN) 3.375 g in 0.9% sodium chloride (MBP/ADV) 100 mL  3.375 g IntraVENous Q8H    LORazepam (ATIVAN) injection 2 mg  2 mg IntraVENous Q4H PRN    Vancomycin Pharmacy Dosing   Other PRN    sodium chloride (NS) flush 20 mL  20 mL InterCATHeter PRN    sodium chloride (NS) flush 10 mL  10 mL InterCATHeter Q24H    sodium chloride (NS) flush 10 mL  10 mL InterCATHeter PRN    sodium chloride (NS) flush 10 mL  10 mL InterCATHeter Q8H    alteplase (CATHFLO) 1 mg in sterile water (preservative free) 1 mL injection  1 mg InterCATHeter PRN     ______________________________________________________________________  EXPECTED LENGTH OF STAY: 4d 21h  ACTUAL LENGTH OF STAY:          Cielo Hurtado NP

## 2018-05-13 NOTE — PROGRESS NOTES
[facility]    Name: Juan David Beal   : 1960   MRN: [id number]   Date: 2018 11:04 AM         Patient was seen and examined earlier today. Overnight events were reviewed. Ventilator is at 40% FiO2 with a PEEP of 5. He continues to be pressor dependent. When sedation is lightened, he does not follow commands. MRI could not be done before placement of temporary pacemaker as he was on airborne precautions. Perhaps repeat imaging of the head with a CT scan can be considered tomorrow to reassess extent of intracranial involvement      Patient was seen and examined earlier today. He had temporary transvenous pacemaker placed a femoral venous access and is now in the Critical Care Unit. Mechanical ventilation settings were reviewed. He appears to be poor. He now cannot travel for MRI because of presence of transvenous pacemaker. Perhaps CT scan of the head will be considered tomorrow to assess for interval change. The oldest brother appears to be taking responsibility for medical decisions and goals of care but is to talk to other siblings to make sure there is a consensus agreement. Prognosis for long-term recovery appears to be poor.   Seen and examined earlier today. Overnight, developed sinus arrest and then subsequently has been in accelerated junctional rhythm. On dobutamine. Suspected CAD and conduction system abscess a possibility. On dobutamine and plans for temporary pacemaker noted. CXR with progressive pneumonic infiltrates and perihilar edema. All blood cultures growling MRSA. On airborne precautions per infection control and suspicion for MTB is low. Bronchoscopy not indicated and will be high risk leading to cardiac arrest with underlying AV disassociation. 5/10  Seen and examined earlier today Awakens but mumbling. Blood cultures growing staph. Echo with TV and MV vegetations.  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 Can DC airborne precautions from pulmonary standpoint, Plats low, could be zosyn. Will consult ID for long term antibiotic management. Converted to a fib this am and troponin is elevated    5/9  New pt  63 yo male with h/o IVDA, HTN, COPD, Hep C    To ED hypotensive encephalopathic  AMS per family; prodrome resp issues. .  Mumbling historian, non-focal    ED eval-  Febrile 103 with WBC 35k  Hypotensive/ tachycardic--> fluid bolus  Elevated lactate  AKD 56/2.7  UDS + cocaine/opiates  abnml chest imaging most c/w septic embolic - multiple cavitary lesions  Report: Multiple bilateral cavitary lung nodules. Differential diagnostic possibilities  include infection, including septic emboli, and tumor.         Head Ct- ? abnml-->IMPRESSION: Subtle area of hyperattenuation in the right posterior parietal  lobe. MRI would be useful for further evaluation. Alternatively, a follow-up CT  in a few hours could be performed to evaluate for interval change. A small  hemorrhage cannot be excluded and clinical correlation is recommended. Now admitted ICU on broad spectrum abx  For neurosgy and renal eval    PMH- per limited records  COPD/HTN/HepC/ IVDA    SH smoker/ +EtOH/ + coacine/heroin    FH/ROS- unobtainable. .    Prior to Admission Medications   Prescriptions Last Dose Informant Patient Reported? Taking? buPROPion SR (WELLBUTRIN SR) 100 mg SR tablet Unknown at Unknown time  Yes No   diclofenac EC (VOLTAREN) 75 mg EC tablet Unknown at Unknown time  No No   Sig: Take 1 tablet by mouth two (2) times a day. polyethylene glycol (MIRALAX) 17 gram/dose powder Unknown at Unknown time  No No   Sig: Take 17 g by mouth daily.  1 tablespoon with 8 oz of water daily   risperiDONE (RISPERDAL) 4 mg tablet Unknown at Unknown time  Yes No   traZODone (DESYREL) 100 mg tablet Unknown at Unknown time  Yes No      Facility-Administered Medications: None         Vital Signs:    Visit Vitals    /60    Pulse 69    Temp (!) 100.6 °F (38.1 °C)    Resp 17    Ht 6' (1.829 m)    Wt 87 kg (191 lb 12.8 oz)    SpO2 98%    BMI 26.01 kg/m2       O2 Device: Endotracheal tube   O2 Flow Rate (L/min): 40 l/min   Temp (24hrs), Av.4 °F (36.9 °C), Min:97.2 °F (36.2 °C), Max:100.6 °F (38.1 °C)       Intake/Output:   Last shift:       07 - 1900  In: 346.2 [I.V.:326.2]  Out: 1925 [HENWX:3915]  Last 3 shifts: 1901 -  07  In: 11851.1 [I.V.:83498.1]  Out: 5003 [Urine:5575]    Intake/Output Summary (Last 24 hours) at 18 1642  Last data filed at 18 1540   Gross per 24 hour   Intake          9124.87 ml   Output             3975 ml   Net          5149.87 ml       Physical Exam:    Chonically ill disheveled BM  Intubated, sedated  Mumbles incoherently  NC/AT  EOMI- muddly sclera. Reactive pupils  Moist MM- visible  dentition ok  Neck no JVD/adenpathy/ R IJ CVL  Chest scattered loose rhonchi  CV tachy S3  Abd NT- no guarding  Ext no edema  Skin dry/ LE statsis cahnges  Moves all 4 follows simple commands    DATA:   Current Facility-Administered Medications   Medication Dose Route Frequency    vancomycin (VANCOCIN) 1250 mg in  ml infusion  1,250 mg IntraVENous Q18H    amiodarone (CORDARONE) 375 mg/250 mL D5W infusion  0.5-1 mg/min IntraVENous TITRATE    bumetanide (BUMEX) injection 1 mg  1 mg IntraVENous BID    propofol (DIPRIVAN) infusion  0-30 mcg/kg/min IntraVENous TITRATE    fentaNYL (PF) 900 mcg/30 ml infusion soln  0-200 mcg/hr IntraVENous TITRATE    albuterol-ipratropium (DUO-NEB) 2.5 MG-0.5 MG/3 ML  3 mL Nebulization Q6H RT    chlorhexidine (PERIDEX) 0.12 % mouthwash 15 mL  15 mL Oral BID    pantoprazole (PROTONIX) 40 mg in sodium chloride 0.9% 10 mL injection  40 mg IntraVENous DAILY    PHENYLephrine (PF)(PATSY-SYNEPHRINE) 30 mg in 0.9% sodium chloride 250 mL infusion   mcg/min IntraVENous TITRATE    acetaminophen (TYLENOL) suppository 650 mg  650 mg Rectal Q6H PRN    DOBUTamine (DOBUTREX) 500 mg/250 mL (2,000 mcg/mL) infusion  2.5 mcg/kg/min IntraVENous TITRATE    sodium chloride (NS) flush 5-10 mL  5-10 mL IntraVENous Q8H    sodium chloride (NS) flush 5-10 mL  5-10 mL IntraVENous PRN    sodium chloride (NS) flush 5-10 mL  5-10 mL IntraVENous PRN    piperacillin-tazobactam (ZOSYN) 3.375 g in 0.9% sodium chloride (MBP/ADV) 100 mL  3.375 g IntraVENous Q8H    LORazepam (ATIVAN) injection 2 mg  2 mg IntraVENous Q4H PRN    Vancomycin Pharmacy Dosing   Other PRN    sodium chloride (NS) flush 20 mL  20 mL InterCATHeter PRN    sodium chloride (NS) flush 10 mL  10 mL InterCATHeter Q24H    sodium chloride (NS) flush 10 mL  10 mL InterCATHeter PRN    sodium chloride (NS) flush 10 mL  10 mL InterCATHeter Q8H    alteplase (CATHFLO) 1 mg in sterile water (preservative free) 1 mL injection  1 mg InterCATHeter PRN       Telemetry: sinus tachy          Labs:  Recent Labs      05/13/18   0517  05/12/18   0555  05/11/18   0453   WBC  25.1*  28.1*  25.7*   HGB  11.5*  12.1  12.4   HCT  36.0*  36.9  37.5   PLT  32*  31*  26*     Recent Labs      05/13/18   0517  05/12/18   0555  05/11/18   0453   NA  149*  150*  148*   K  4.4  4.3  4.0   CL  117*  120*  118*   CO2  23  23  19*   GLU  180*  177*  161*   BUN  63*  76*  70*   CREA  1.73*  1.77*  1.65*   CA  7.0*  7.1*  7.0*   MG  2.7*  3.4*  3.6*   PHOS  3.9  4.6  3.9   ALB  1.4*  1.5*  1.5*   SGOT  143*  114*  176*   ALT  62  53  59   INR  1.4*  1.5*  1.8*     ABG 7.53/24/71 room air    Imaging:  I have personally reviewed the patients radiographs and reports.        IMPRESSION:   · MODS with Sepsis with clinical picture most c/w endocarditis (TV and MV vegetations) + septic pulmonary emboli: staph bacteremia, Dr. Swanson [facility] following (Thanks!)  · Acute resp failure with MRSA pneumonia and pulmonary edema  · S/P bradycardic arrest with AV disassociation - Temporary pacemaker in place  · Acute decompensated HFpEF (arrhythmia, MR, ischemia)  · Encephalopathy  · AKD with hematuria  · ? Subtle CNS event by initial head CT  · IVDA- cocaine/optiates  · Hep C  · \"COPD\"  · Thrombocytopenia- sepsis/zosyn   PLAN:   · VACV- settings adjusted  · On dobutamine, temporary TVP (femoral access)  · Vent bundle  · Palliative Medicine consult-- MSOF, large family, goals of care: re-meet on 5/14  · CTS, cardiology, nephrology following  · Broad spectrum abx  · IVF--> monitor CVP/ UOP  · IVF adjusted  · MRI of head when stable- was not able to go yesterday because of airborne precautions  · Surveillance cultures  · Tube feeds  · SCDs  · Sedatives reviewed: adjusted. DC precedex in view of bradycardia and AV conduction issues  · Protonix  ·  rhythm management per cardiology  ·  labs and x-rays ordered for tomorrow  · ?? CT head tomorrow  · ICU protocol care  · D/W RN           The pt is critically ill at hi risk further decompensation. See my orders for details    Total critical care time exclusive of procedures 35 minutes.     Shamir Catalan MD

## 2018-05-14 NOTE — PROGRESS NOTES
Cardiology Progress Note                                        Admit Date: 5/9/2018    Assessment/Plan:     SBE; of both MV and TV; still febrile and bacteremic  Cardiac arrest with long pause; temp pacer; right now sinus  PAF; no further recurrence  Prognosis; remains very grim and critical; I spoke with his siblings      Emma Hernandez is a 62 y.o. male with     PROBLEM LIST:  Patient Active Problem List    Diagnosis Date Noted    [facility] ([facility] 05/09/2018    Sepsis ([facility] 05/09/2018         Subjective:      Emma Hernandez intubated    Visit Vitals    /65    Pulse 73    Temp (!) 101.5 °F (38.6 °C)    Resp 25    Ht 6' (1.829 m)    Wt 85.4 kg (188 lb 4.4 oz)    SpO2 94%    BMI 25.53 kg/m2       Intake/Output Summary (Last 24 hours) at 05/14/18 1505  Last data filed at 05/14/18 1202   Gross per 24 hour   Intake          3954.57 ml   Output             3830 ml   Net           124.57 ml       Objective:      Physical Exam:  HEENT: Perrla, EOMI  Neck: No JVD,  No thyroidmegaly  Resp:  rales  CV: RRR s1s2 No new murmur no s3  Abd:Soft, Nontender  Ext: 2+ edema  Neuro: sedated  Skin: Warm, Dry, Intact  Pulses: 2+ DP/PT/Rad      Telemetry: normal sinus rhythm    Current Facility-Administered Medications   Medication Dose Route Frequency    DOBUTamine (DOBUTREX) 500 MG/250 mL (2000 mcg/mL) in D5W infusion  0-10 mcg/kg/min IntraVENous TITRATE    sodium chloride 0.9 % bolus infusion 100 mL  100 mL IntraVENous RAD ONCE    iopamidol (ISOVUE-370) 76 % injection 100 mL  100 mL IntraVENous RAD ONCE    sodium chloride (NS) flush 10 mL  10 mL IntraVENous RAD ONCE    vancomycin (VANCOCIN) 1250 mg in  ml infusion  1,250 mg IntraVENous Q18H    amiodarone (CORDARONE) 375 mg/250 mL D5W infusion  0.5-1 mg/min IntraVENous TITRATE    bumetanide (BUMEX) injection 1 mg  1 mg IntraVENous BID    propofol (DIPRIVAN) infusion  0-30 mcg/kg/min IntraVENous TITRATE    fentaNYL (PF) 900 mcg/30 ml infusion soln 0-200 mcg/hr IntraVENous TITRATE    albuterol-ipratropium (DUO-NEB) 2.5 MG-0.5 MG/3 ML  3 mL Nebulization Q6H RT    chlorhexidine (PERIDEX) 0.12 % mouthwash 15 mL  15 mL Oral BID    pantoprazole (PROTONIX) 40 mg in sodium chloride 0.9% 10 mL injection  40 mg IntraVENous DAILY    PHENYLephrine (PF)(PATSY-SYNEPHRINE) 30 mg in 0.9% sodium chloride 250 mL infusion   mcg/min IntraVENous TITRATE    acetaminophen (TYLENOL) suppository 650 mg  650 mg Rectal Q6H PRN    sodium chloride (NS) flush 5-10 mL  5-10 mL IntraVENous Q8H    sodium chloride (NS) flush 5-10 mL  5-10 mL IntraVENous PRN    sodium chloride (NS) flush 5-10 mL  5-10 mL IntraVENous PRN    piperacillin-tazobactam (ZOSYN) 3.375 g in 0.9% sodium chloride (MBP/ADV) 100 mL  3.375 g IntraVENous Q8H    LORazepam (ATIVAN) injection 2 mg  2 mg IntraVENous Q4H PRN    Vancomycin Pharmacy Dosing   Other PRN    sodium chloride (NS) flush 20 mL  20 mL InterCATHeter PRN    sodium chloride (NS) flush 10 mL  10 mL InterCATHeter Q24H    sodium chloride (NS) flush 10 mL  10 mL InterCATHeter PRN    sodium chloride (NS) flush 10 mL  10 mL InterCATHeter Q8H    alteplase (CATHFLO) 1 mg in sterile water (preservative free) 1 mL injection  1 mg InterCATHeter PRN         Data Review:   Labs:    Recent Results (from the past 24 hour(s))   EKG, 12 LEAD, INITIAL    Collection Time: 05/14/18  4:55 AM   Result Value Ref Range    Ventricular Rate 73 BPM    Atrial Rate 73 BPM    P-R Interval 130 ms    QRS Duration 102 ms    Q-T Interval 450 ms    QTC Calculation (Bezet) 495 ms    Calculated P Axis -7 degrees    Calculated R Axis 68 degrees    Calculated T Axis 15 degrees    Diagnosis       Normal sinus rhythm  ST & T wave abnormality, consider anterior ischemia  Prolonged QT  When compared with ECG of 11-MAY-2018 15:07,  Sinus rhythm has replaced Atrial fibrillation  Vent.  rate has decreased BY  41 BPM  Non-specific change in ST segment in Inferior leads  Confirmed by Eleanor Grimaldo M.D., [employer] [employer] ([id number]) on 5/14/2018 8:32:41 AM     CBC WITH AUTOMATED DIFF    Collection Time: 05/14/18  5:01 AM   Result Value Ref Range    WBC 29.1 (H) 4.1 - 11.1 K/uL    RBC 4.36 4.10 - 5.70 M/uL    HGB 11.9 (L) 12.1 - 17.0 g/dL    HCT 37.0 36.6 - 50.3 %    MCV 84.9 80.0 - 99.0 FL    MCH 27.3 26.0 - 34.0 PG    MCHC 32.2 30.0 - 36.5 g/dL    RDW 16.3 (H) 11.5 - 14.5 %    PLATELET 43 (LL) 921 - 400 K/uL    NRBC 0.2 (H) 0  WBC    ABSOLUTE NRBC 0.05 (H) 0.00 - 0.01 K/uL    NEUTROPHILS 91 (H) 32 - 75 %    LYMPHOCYTES 3 (L) 12 - 49 %    MONOCYTES 2 (L) 5 - 13 %    EOSINOPHILS 2 0 - 7 %    BASOPHILS 0 0 - 1 %    MYELOCYTES 2 (H) 0 %    IMMATURE GRANULOCYTES 0 %    ABS. NEUTROPHILS 26.5 (H) 1.8 - 8.0 K/UL    ABS. LYMPHOCYTES 0.9 0.8 - 3.5 K/UL    ABS. MONOCYTES 0.6 0.0 - 1.0 K/UL    ABS. EOSINOPHILS 0.6 (H) 0.0 - 0.4 K/UL    ABS. BASOPHILS 0.0 0.0 - 0.1 K/UL    ABS. IMM. GRANS. 0.0 K/UL    DF MANUAL      RBC COMMENTS ANISOCYTOSIS  1+       METABOLIC PANEL, COMPREHENSIVE    Collection Time: 05/14/18  5:01 AM   Result Value Ref Range    Sodium 151 (H) 136 - 145 mmol/L    Potassium 4.4 3.5 - 5.1 mmol/L    Chloride 121 (H) 97 - 108 mmol/L    CO2 23 21 - 32 mmol/L    Anion gap 7 5 - 15 mmol/L    Glucose 164 (H) 65 - 100 mg/dL    BUN 58 (H) 6 - 20 MG/DL    Creatinine 1.70 (H) 0.70 - 1.30 MG/DL    BUN/Creatinine ratio 34 (H) 12 - 20      GFR est AA 50 (L) >60 ml/min/1.73m2    GFR est non-AA 42 (L) >60 ml/min/1.73m2    Calcium 7.4 (L) 8.5 - 10.1 MG/DL    Bilirubin, total 4.0 (H) 0.2 - 1.0 MG/DL    ALT (SGPT) 80 (H) 12 - 78 U/L    AST (SGOT) 162 (H) 15 - 37 U/L    Alk.  phosphatase 65 45 - 117 U/L    Protein, total 6.4 6.4 - 8.2 g/dL    Albumin 1.5 (L) 3.5 - 5.0 g/dL    Globulin 4.9 (H) 2.0 - 4.0 g/dL    A-G Ratio 0.3 (L) 1.1 - 2.2     MAGNESIUM    Collection Time: 05/14/18  5:01 AM   Result Value Ref Range    Magnesium 2.2 1.6 - 2.4 mg/dL   PHOSPHORUS    Collection Time: 05/14/18  5:01 AM   Result Value Ref Range Phosphorus 2.8 2.6 - 4.7 MG/DL

## 2018-05-14 NOTE — PROGRESS NOTES
Spoke to patient oldest brother, Magdalena Kowalski, updated on CT head findings of Sub arachnoid hemorrhage , he confirmed plan to meet with 4 /out of 8  patient siblings /next of kin,  at 3 pm today .

## 2018-05-14 NOTE — PROGRESS NOTES
CM attended IDR rounds today. Patient remain intubated and sedated. [facility] [place] [facility] [place] [facility] [address] and was alert and oriented prior to admission. [facility] He came to ER due to AMS and severe sepsis. He has severe sepsis with multisystem organ dysfunction in the setting of staph endocarditis . Receiving IV abx. Family to meet with Palliative Care today at 3 pm.with 4 of patient's  8 siblings. Nidia Jaramillo is the oldest brother. Names of several are in chart. .  Patient is a full code and has no AMD.   No family in room when CM attempted to talk with a family member. CM to remain available to assist with transition of care planning.

## 2018-05-14 NOTE — CONSULTS
Palliative Medicine Consult  Dontae: [phone] ([phone])    Patient Name: Yonis Rausch  YOB: 1960    Date of Initial Consult: 5/11/18  Reason for Consult: Care Decisions   Requesting Provider: Anthony Hinkle MD  Primary Care Physician: None     SUMMARY:   Yonis Rausch is a 62 y.o.  [place] male  with a past history of hep c, I/V drug abuse, htn ,  who was admitted on 5/9/2018 from home  with a diagnosis of unresponsiveness and sepsis , uds positive for cocaine. 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 5/14/18:  Ct of head :New left subarachnoid hemorrhage with persistent area of hypodensity in the right occipital lobe compatible with additional small subarachnoid hemorrhage from cardioembolic process. hemorrhage. i  Psychosocail: , wife is incarcerated,  No children ,have eight siblings/ legal next of kin . Patient has long standing h/o drug abuse. Di Seymour (sister ) # [phone]. Flores Ubaldo # [phone]  Ni Klein # [phone]  [facility] # [phone]    Дмитрий # [phone]    Osbaldo Lowe : [phone]  Maggie [facility] : [phone]     PALLIATIVE DIAGNOSES:   1. Goals of Care counseling and discussion   2. Encephalopathy  3. Sepsis with MRSA Mitral and tricuspid  valve endocarditis(7. Long h/o I/v drug abuse)  4. Subarachnoid hemorrhage from cardioembolic process( 7/38/68)  5. Resp failure/ vent dependant /Multi organ failure  6. Debility  7. John cardiac arrest , s/p temporary pacemaker . PLAN:    Family Meting with 6/8 siblings /next of kin  Kristyn Vaughan and patient niece Kina Montesinos therapist Ms Shaniqua Nuñez . Wife/ surrogate is incarcerated for several years . 1. ReIntroduce Palliative care service. 2. Family shared their understanding of patient poor prognosis , based on their discussion with medical team , they understand he is not going to  Make it through . 3. I updated them on CT of head results showing subarachnoid hemorrhage. 4. We discussed CPR if his heart  stops, family is in agreement not to put him through cardiac compression and shocks, if his heart stops, \" he has suffered enough\" \", they are in agreement for DNAR. DNR order placed on chart, pink sheet placed . No : reintubation   NO: Vasopressor  Yes : cpap/bipap    5. Family shared patient is  his [facility] is [facility]    6. Family agrees for Geeta oldest brother to be contact point for medical team # [phone].    7. We agreed to follow up with Geeta oldest brother on this Wednesday. Initial consult note routed to primary continuity provider  Communicated plan of care with: Palliative IDT, Bed side Rn Zainab Deutsch. GOALS OF CARE / TREATMENT PREFERENCES:     GOALS OF CARE:     Currently full restorative care , tentative fm Monday 5/14 at 3 pm .    TREATMENT PREFERENCES:   Code Status: DNAR    Advance Care Planning:  No flowsheet data found. Limited intervention   Other Instructions: Other:    As far as possible, the palliative care team has discussed with patient / health care proxy about goals of care / treatment preferences for patient. HISTORY:     History obtained from: chart , bed side RN and his brother Dwayne Montejo. CHIEF COMPLAINT: admitted for above. HPI/SUBJECTIVE:    The patient is:     [] Non-participatory due to: obtunded. Sedated , intubated, on presors, s/p cardiac arrest today. 5/14/18: Patient is intubated , sedated, vent dependant.       Clinical Pain Assessment (nonverbal scale for severity on nonverbal patients):   Clinical Pain Assessment  Severity: 0     Activity (Movement): Lying quietly, normal position    Duration: for how long has pt been experiencing pain (e.g., 2 days, 1 month, years)  Frequency: how often pain is an issue (e.g., several times per day, once every few days, constant)     FUNCTIONAL ASSESSMENT:     Palliative Performance Scale (PPS):  PPS: 30       PSYCHOSOCIAL/SPIRITUAL SCREENING:     Palliative IDT has assessed this patient for cultural preferences / practices and a referral made as appropriate to needs (Cultural Services, Patient Advocacy, Ethics, etc.)    Advance Care Planning:  No flowsheet data found. Any spiritual / Evangelical concerns:  [] Yes /  [] No    Caregiver Burnout:  [] Yes /  [x] No /  [] No Caregiver Present      Anticipatory grief assessment:   [] Normal  / [] Maladaptive       ESAS Anxiety:      ESAS Depression:     Unable to evaluate above because of patient condition . REVIEW OF SYSTEMS:     Positive and pertinent negative findings in ROS are noted above in HPI. The following systems were [] reviewed / [x] unable to be reviewed as noted in HPI  Other findings are noted below. Systems: constitutional, ears/nose/mouth/throat, respiratory, gastrointestinal, genitourinary, musculoskeletal, integumentary, neurologic, psychiatric, endocrine. Positive findings noted below. Modified ESAS Completed by: provider           Pain: 0     Nausea: 0     Dyspnea: 0           Stool Occurrence(s): 1        PHYSICAL EXAM:     From RN flowsheet:  Wt Readings from Last 3 Encounters:   05/14/18 188 lb 4.4 oz (85.4 kg)   05/09/18 178 lb 9.2 oz (81 kg)   11/25/14 198 lb (89.8 kg)     Blood pressure 115/64, pulse 72, temperature 100.4 °F (38 °C), resp. rate 25, height 6' (1.829 m), weight 188 lb 4.4 oz (85.4 kg), SpO2 95 %.     Pain Scale 1: Adult Nonverbal Pain Scale  Pain Intensity 1: 0                 Last bowel movement, if known:     Constitutional: chronically sick, looks older to stated age, intubated , sedated , soft restraints. Eyes: pupils equal, anicteric  ENMT: dry oral mucosa  Cardiovascular: regular rhythm, + edema   Respiratory: breathing not labored, symmetric  Gastrointestinal: soft non-tender, +bowel sounds  Musculoskeletal: no deformity, no tenderness to palpation  Skin: warm, dry  Neurologic: intubated, sedated  Psychiatric: unable to evaluate  Other:       HISTORY:     Principal Problem:    Sepsis ([facility] (5/9/2018)    Active Problems:    Shock ([facility] (5/9/2018)      Past Medical History:   Diagnosis Date    Hypertension     Infectious disease     Hepatitis C      Past Surgical History:   Procedure Laterality Date    HX ORTHOPAEDIC      back injury 2013      History reviewed. No pertinent family history. History reviewed, no pertinent family history.   Social History   Substance Use Topics    Smoking status: Current Every Day Smoker    Smokeless tobacco: Never Used    Alcohol use Yes      Comment: occasional     No Known Allergies   Current Facility-Administered Medications   Medication Dose Route Frequency    DOBUTamine (DOBUTREX) 500 MG/250 mL (2000 mcg/mL) in D5W infusion  0-10 mcg/kg/min IntraVENous TITRATE    sodium chloride 0.9 % bolus infusion 100 mL  100 mL IntraVENous RAD ONCE    iopamidol (ISOVUE-370) 76 % injection 100 mL  100 mL IntraVENous RAD ONCE    sodium chloride (NS) flush 10 mL  10 mL IntraVENous RAD ONCE    vancomycin (VANCOCIN) 1250 mg in  ml infusion  1,250 mg IntraVENous Q18H    amiodarone (CORDARONE) 375 mg/250 mL D5W infusion  0.5-1 mg/min IntraVENous TITRATE    bumetanide (BUMEX) injection 1 mg  1 mg IntraVENous BID    propofol (DIPRIVAN) infusion  0-30 mcg/kg/min IntraVENous TITRATE    fentaNYL (PF) 900 mcg/30 ml infusion soln  0-200 mcg/hr IntraVENous TITRATE    albuterol-ipratropium (DUO-NEB) 2.5 MG-0.5 MG/3 ML  3 mL Nebulization Q6H RT    chlorhexidine (PERIDEX) 0.12 % mouthwash 15 mL  15 mL Oral BID    pantoprazole (PROTONIX) 40 mg in sodium chloride 0.9% 10 mL injection  40 mg IntraVENous DAILY    PHENYLephrine (PF)(PATSY-SYNEPHRINE) 30 mg in 0.9% sodium chloride 250 mL infusion   mcg/min IntraVENous TITRATE    acetaminophen (TYLENOL) suppository 650 mg  650 mg Rectal Q6H PRN    sodium chloride (NS) flush 5-10 mL  5-10 mL IntraVENous Q8H    sodium chloride (NS) flush 5-10 mL  5-10 mL IntraVENous PRN    sodium chloride (NS) flush 5-10 mL  5-10 mL IntraVENous PRN    piperacillin-tazobactam (ZOSYN) 3.375 g in 0.9% sodium chloride (MBP/ADV) 100 mL  3.375 g IntraVENous Q8H    LORazepam (ATIVAN) injection 2 mg  2 mg IntraVENous Q4H PRN    Vancomycin Pharmacy Dosing   Other PRN    sodium chloride (NS) flush 20 mL  20 mL InterCATHeter PRN    sodium chloride (NS) flush 10 mL  10 mL InterCATHeter Q24H    sodium chloride (NS) flush 10 mL  10 mL InterCATHeter PRN    sodium chloride (NS) flush 10 mL  10 mL InterCATHeter Q8H    alteplase (CATHFLO) 1 mg in sterile water (preservative free) 1 mL injection  1 mg InterCATHeter PRN          LAB AND IMAGING FINDINGS:     Lab Results   Component Value Date/Time    WBC 29.1 (H) 05/14/2018 05:01 AM    HGB 11.9 (L) 05/14/2018 05:01 AM    PLATELET 43 (LL) 73/26/5623 05:01 AM     Lab Results   Component Value Date/Time    Sodium 151 (H) 05/14/2018 05:01 AM    Potassium 4.4 05/14/2018 05:01 AM    Chloride 121 (H) 05/14/2018 05:01 AM    CO2 23 05/14/2018 05:01 AM    BUN 58 (H) 05/14/2018 05:01 AM    Creatinine 1.70 (H) 05/14/2018 05:01 AM    Calcium 7.4 (L) 05/14/2018 05:01 AM    Magnesium 2.2 05/14/2018 05:01 AM    Phosphorus 2.8 05/14/2018 05:01 AM      Lab Results   Component Value Date/Time    AST (SGOT) 162 (H) 05/14/2018 05:01 AM    Alk.  phosphatase 65 05/14/2018 05:01 AM    Protein, total 6.4 05/14/2018 05:01 AM    Albumin 1.5 (L) 05/14/2018 05:01 AM    Globulin 4.9 (H) 05/14/2018 05:01 AM     Lab Results   Component Value Date/Time    INR 1.4 (H) 05/13/2018 05:17 AM    Prothrombin time 14.5 (H) 05/13/2018 05:17 AM    aPTT 30.7 05/10/2018 09:37 PM      No results found for: IRON, FE, TIBC, IBCT, PSAT, FERR   No results found for: PH, PCO2, PO2  No components found for: [place]   Lab Results   Component Value Date/Time     05/10/2018 06:05 AM    CK - MB 31.6 (H) 05/10/2018 06:05 AM                Total time:   Counseling / coordination time, spent as noted above:   > 50% counseling / coordination?:     Prolonged service was provided for  []30 min   []75 min in face to face time in the presence of the patient, spent as noted above. Time Start:   Time End:   Note: this can only be billed with 96552 (initial) or 66086 (follow up). If multiple start / stop times, list each separately.

## 2018-05-14 NOTE — PROGRESS NOTES
[facility] ICU Progress Note    Admit Date: 2018       Subjective:   Pt seen with Dr. Pipe Washington. Tmax 100.8, on vent fiO2 40%. On dobut, katarina, amio, fentanyl, and propofol gtts. Objective:   Vitals:  Blood pressure 114/68, pulse 73, temperature 100 °F (37.8 °C), resp. rate 22, height 6' (1.829 m), weight 188 lb 4.4 oz (85.4 kg), SpO2 93 %. Temp (24hrs), Av.1 °F (37.8 °C), Min:99.3 °F (37.4 °C), Max:100.8 °F (38.2 °C)    EKG/Rhythm:  paced    Ventilator:  Ventilator Volumes  Vt Set (ml): 5002 ml (18)  Vt Exhaled (Machine Breath) (ml): 530 ml (18)  Ve Observed (l/min): 11.6 l/min (18)    Oxygen Therapy:  Oxygen Therapy  O2 Sat (%): 93 % (18)  Pulse via Oximetry: 73 beats per minute (18)  O2 Device: Ventilator (18)  O2 Flow Rate (L/min): 40 l/min (05/10/18 2000)  FIO2 (%): 40 % (18)    CXR: IMPRESSION: Complete opacity of the left hemithorax with mediastinal shift to  the left consistent with mucous plugging and collapse. Admission Weight: Last Weight   Weight: 178 lb 9.6 oz (81 kg) Weight: 188 lb 4.4 oz (85.4 kg)     Intake / Output / Drain:  Current Shift:    Last 24 hrs.:   Intake/Output Summary (Last 24 hours) at 18 0834  Last data filed at 18 0700   Gross per 24 hour   Intake          4041.74 ml   Output             4255 ml   Net          -213.26 ml       EXAM:  General:  Sedated, on vent. Lungs:   Clear right, diminished left to auscultation    Incision:  No erythema, drainage or swelling. Heart:  Regular rate and rhythm, S1, S2 normal, + murmur, click, rub or gallop. Abdomen:   Soft, non-tender. Bowel sounds normal. No masses,  No organomegaly. Extremities:  2+ edema. PPP. Neurologic:  Gross motor and sensory apparatus intact.      Labs: Recent Labs      18   0501  18   0517   WBC  29.1*  25.1*   HGB  11.9* 11.5*   HCT  37.0  36.0*   PLT  43*  32*   NA  151*  149*   K  4.4  4.4   BUN  58*  63*   CREA  1.70*  1.73*   GLU  164*  180*   INR   --   1.4*        Assessment:     Principal Problem:    Sepsis ([facility] (2018)    Active Problems:    Shock ([facility] (2018)         Plan/Recommendations/Medical Decision Makin. Sepsis with endocarditis of mitral and tricuspid valves: Tmax 100.8, WBC 29,  BC and sputum + MRSA, ID following, cont abx per ID, preop workup in process, surgical plans per Dr. Pipe Washington -will need at least 6-8 weeks of IV antibiotics prior to surgery      2. Encephalopathy: monitor      3. Acute renal failure:  Cr 1.7, Nephrology following, monitor U/O      4. Thrombocytopenia: Plt 43, monitor      5. Hep C/Elevated liver enzymes: Tbili 4., , ALT 80 cont to monitor      6. Hx IVDA: cocaine and heroin abuse, HIV negative      7. Hx HTN: monitor      8. Respiratory failure: pulmonary following. CXR with multiple cavitary lung lesions, septic empoli vs. Tumor vs. TB workup PPD negative, AFB pending. Off airborne precautions.      9. Elevated troponin: cardiology following     10. Paroxysmal Afib: Currently paced via transvenous pacemaker. On amio gtt    11. Hypernatremia: Free water via NG per nephrology    12. SAH: CT showing new SAH, neuro following, unable to do MRI dt transvenous pacemaker, monitor neuro status. Signed By: Rashi Romero NP     Saw patient, agree with above  Risk of morbidity and mortality - high  Medical decision making - high complexity    Medical decision making      1. Sepsis with endocarditis of mitral and tricuspid valves: Abx's for now      2. Encephalopathy: monitor      3. Acute renal failure: Nephrology following      4. Thrombocytopenia: Plt 84, monitor      5. Hep C/Elevated liver enzymes: monitor      6. Hx IVDA: cocaine and heroin abuse, HIV pending      7. Hx HTN: monitor      8.  Respiratory insufficiency: monitor

## 2018-05-14 NOTE — INTERDISCIPLINARY ROUNDS
IDR/SLIDR Summary          Patient: Maria M Jacob MRN: [id number]    Age: 62 y.o. YOB: 1960 Room/Bed: [address] [place] [address]   Admit Diagnosis: Sepsis ([facility]  Shock ([facility]  Principal Diagnosis: [facility]   Goals: Afebrile,stable BP & HR  Readmission: NO  Quality Measure: SEPSIS  VTE Prophylaxis: Mechanical  Influenza Vaccine screening completed? NO  Pneumococcal Vaccine screening completed? NO  Mobility needs: No   Nutrition plan:Yes  Consults:P.T, O.T. and Respiratory    Financial concerns:Yes  Escalated to CM? YES  RRAT Score: 4   Interventions:H2H  Testing due for pt today?  YES  LOS: 5 days Expected length of stay TBD days  Discharge plan: TBD   PCP: None  Transportation needs: No    Days before discharge:two or more days before discharge   Discharge disposition: Home    Signed:     Reilly Gonzales  5/14/2018  1:39 AM

## 2018-05-14 NOTE — PROGRESS NOTES
1930 Bedside shift change report given to [address] (oncoming nurse) by Bernice Johns RN (offgoing nurse). Report included the following information SBAR, Kardex, Procedure Summary, Intake/Output, Recent Results and Cardiac Rhythm Paced. 0100 pt temp 100.8, ice placed on groin & axilla  0200 pt temp 100.6, Tylenol given. 0230 transported to CT along with RT.  0250 returned from [address] received call from radiologist Virginie Augustine who reported new L Frontal/Pareital Subarachnoid hemorrhage and worsened R Frontal/Pareital Subarachnoid hemorrhage, reported results to Neurology Dr Meño Hernandez who requests MRI & CTA of head in AM  0400 pt temp   0500 labs & paired Blood Cultures sent; EKG done    0730 Bedside shift change report given to Tomás Briseno (oncoming nurse) by Nena Barlow RN (offgoing nurse). Report included the following information SBAR, Kardex, Procedure Summary, Intake/Output, MAR, Recent Results and Cardiac Rhythm Paced.

## 2018-05-14 NOTE — PROGRESS NOTES
Infectious Diseases Progress Note          Subjective:     He remains sedated on vent    Objective:     Vitals:   Visit Vitals    /70    Pulse 74    Temp 99.3 °F (37.4 °C)    Resp 19    Ht 6' (1.829 m)    Wt 87 kg (191 lb 12.8 oz)    SpO2 99%    BMI 26.01 kg/m2        Tmax:  Temp (24hrs), Av.1 °F (37.3 °C), Min:97.2 °F (36.2 °C), Max:100.8 °F (38.2 °C)      Exam:  General appearance: no distress  Lungs: bilateral rhonchi  Heart: regular rate and rhythm; - apical systolic murmur  Abdomen: soft, non-tender. Bowel sounds normal.   Neuro: sedated    IV Lines: right SC CVL    Labs:    Recent Labs      18   0517  18   0555  18   0453   WBC  25.1*  28.1*  25.7*   HGB  11.5*  12.1  12.4   PLT  32*  31*  26*   BUN  63*  76*  70*   CREA  1.73*  1.77*  1.65*   TBILI  2.8*  3.7*  4.0*   SGOT  143*  114*  176*   AP  59  73  79       Assessment:      1.  Mitral valve and tricuspid valve endocarditis with Staphylococcus aureus (MRSA)    2.  Multiple pulmonary cavitary lesions -- septic emboli. 3.  Renal insufficiency    4.  Thrombocytopenia    5.  Hyperbilirubinemia    6.  Substance abuse -- cocaine and heroin. Plan:     1. Continue Vanc + zosyn    2.  Repeat blood cultures in AM    Lisa Desouza MD

## 2018-05-14 NOTE — WOUND CARE
Wound Care Note:     New consult placed by nurse request for skin breakdown    Chart shows:  Admitted for sepsis  Past Medical History:   Diagnosis Date    Hypertension     Infectious disease     Hepatitis C     WBC = 29.1 on 5/14/18  Admitted from assisted living     Assessment:   Patient is vented and sedated, incontinent with moderate assistance needed in repositioning. Bed: Total Care- Envision on a Versacare bed frame has been ordered. Patient has a Souza. Diet: Tube feeding  Patient did not moan during turning. Bilateral heels and sacral skin intact and without erythema. 1. Left buttock has a partial thickness wound measuring 3 cm x 2 cm x 0.1 cm, wound bed is pink, slightly blanchable, no drainage, craig-wound with hyperpigmentation, wound edges are open. 2.  Right buttock has a partial thickness wound measuring 0.5 cm x 0.5 cm x 0.1 cm, wound bed is pink, blanches well, no drainage, wound edges are open, craig-wound with hyperpigmentation. Spoke with Dr. Jasmine Flores, wound care orders obtained. Patient repositioned on left side   Heels offloaded in Prevalon boots. Recommendations:    Bilateral buttock, sacrum and heels- Every 8 hours apply Venelex ointment. Specialty bed: Envision on a Versacare bed frame ordered via [employer]. Use only flat sheet and one incontinence pad. Please call [facility] Leon if not delivered. Confirmation #[id number]    Skin Care & Pressure Prevention:  Minimize layers of linen/pads under patient to optimize support surface. Turn/reposition approximately every 2 hours and offload heels.   Manage incontinence    Discussed above plan with patient & Miah Soto RN    Transition of Care: Plan to follow as needed while admitted to hospital.    ASHLEY Snow, RN, [facility], [facility].  office [phone]  pager [phone] or call  to page

## 2018-05-14 NOTE — PROGRESS NOTES
ID Progress Note  2018     vanco  Zosyn     Subjective:     Having fevers. Remains on the vent. He is sedated. He is on dobutamine and neosynephrine. Objective:     Vitals:   Visit Vitals    /68    Pulse 73    Temp 100 °F (37.8 °C)    Resp 22    Ht 6' (1.829 m)    Wt 85.4 kg (188 lb 4.4 oz)    SpO2 93%    BMI 25.53 kg/m2        Tmax:  Temp (24hrs), Av.1 °F (37.8 °C), Min:99.3 °F (37.4 °C), Max:100.8 °F (38.2 °C)      Exam:    On the vent, sedated  Eyes: anicteric sclerae  (+) CVL on right upper chest nontender   Lungs: clear to auscultation anteriorly, no rales, wheezes   Heart: (+) systolic murmur   Abdomen: soft, nontender, no guarding or rebound   Extremities: knees not warm or tender   Has pacer inserted from right groin       Labs:   Lab Results   Component Value Date/Time    WBC 29.1 (H) 2018 05:01 AM    HGB 11.9 (L) 2018 05:01 AM    Hematocrit (POC) 35 (L) 2018 05:03 AM    HCT 37.0 2018 05:01 AM    PLATELET 43 (LL)  05:01 AM    MCV 84.9 2018 05:01 AM     Lab Results   Component Value Date/Time    Sodium 151 (H) 2018 05:01 AM    Potassium 4.4 2018 05:01 AM    Chloride 121 (H) 2018 05:01 AM    CO2 23 2018 05:01 AM    Anion gap 7 2018 05:01 AM    Glucose 164 (H) 2018 05:01 AM    BUN 58 (H) 2018 05:01 AM    Creatinine 1.70 (H) 2018 05:01 AM    BUN/Creatinine ratio 34 (H) 2018 05:01 AM    GFR est AA 50 (L) 2018 05:01 AM    GFR est non-AA 42 (L) 2018 05:01 AM    Calcium 7.4 (L) 2018 05:01 AM    Bilirubin, total 4.0 (H) 2018 05:01 AM    AST (SGOT) 162 (H) 2018 05:01 AM    Alk.  phosphatase 65 2018 05:01 AM    Protein, total 6.4 2018 05:01 AM    Albumin 1.5 (L) 2018 05:01 AM    Globulin 4.9 (H) 2018 05:01 AM    A-G Ratio 0.3 (L) 2018 05:01 AM    ALT (SGPT) 80 (H) 2018 05:01 AM     cxr  personally reviewed - completely opacified left hemithorax     5/9  2 out of 2 MRSA  5/10 2 out of 4 MRSA  5/11 1 out of 2 MRSA  5/14 blood cultures pending       Assessment:       1. Mitral valve and tricuspid valve endocarditis with MRSA    2. Multiple pulmonary cavitary lesions, likely from septic emboli. 3.  Severe mitral regurgitation. 4.  Renal insufficiency. 5.  Thrombocytopenia. 6.  Hyperbilirubinemia. 7.  Substance abuse due to cocaine and heroin. 8. Left subarachnoid hemorrhage     9. S/p transvenous pacer placement on 5/11         Recommendations:       1. He will need 6 weeks of IV MRSA therapy all of which needs to be as an inpatient. Very concerning that he has not cleared bacteremia yet. Ff up 5/14 cultures     2. Ultrasound of the right upper quadrant shows some pericholecystic fluid but no stones. There is some thickening. Watch for acalculous cholecystitis. Continue zosyn     3. Now has subarachnoid hemorrhage and opacified left lung. If family desires aggressive measures, he will need a therapeutic bronch.        Alejandra Doe MD

## 2018-05-14 NOTE — PROGRESS NOTES
Problem: Pressure Injury - Risk of  Goal: *Prevention of pressure injury  Document Darwin Scale and appropriate interventions in the flowsheet. Outcome: Progressing Towards Goal  Pressure Injury Interventions:  Sensory Interventions: Assess need for specialty bed, Float heels, Pressure redistribution bed/mattress (bed type), Turn and reposition approx. every two hours (pillows and wedges if needed)    Moisture Interventions: Absorbent underpads, Assess need for specialty bed, Limit adult briefs, Maintain skin hydration (lotion/cream)    Activity Interventions: Assess need for specialty bed, Pressure redistribution bed/mattress(bed type), PT/OT evaluation    Mobility Interventions: Assess need for specialty bed, Float heels, HOB 30 degrees or less, Pressure redistribution bed/mattress (bed type)    Nutrition Interventions: Document food/fluid/supplement intake    Friction and Shear Interventions: Apply protective barrier, creams and emollients, Foam dressings/transparent film/skin sealants, HOB 30 degrees or less, Lift sheet               Problem: Falls - Risk of  Goal: *Absence of Falls  Document Jer Fall Risk and appropriate interventions in the flowsheet.    Outcome: Progressing Towards Goal  Fall Risk Interventions:  Mobility Interventions: Communicate number of staff needed for ambulation/transfer, Patient to call before getting OOB    Mentation Interventions: Door open when patient unattended, Evaluate medications/consider consulting pharmacy, More frequent rounding    Medication Interventions: Evaluate medications/consider consulting pharmacy, Patient to call before getting OOB    Elimination Interventions: Call light in reach, Patient to call for help with toileting needs, Toileting schedule/hourly rounds

## 2018-05-14 NOTE — PROGRESS NOTES
Hospitalist Progress Note  Kevin Pond NP  Answering service: [phone] from in house phone  Cell: [phone]      Date of Service:  2018  NAME:  Pankaj Luke  :  1960  MRN:  [id number]      Admission Summary:   62year old with PMH of COPD, HTN, and Hep C with significant forty plus year drug abuse history. The patient lives in assisted living at [facility] and [address] [place] [address] in an independent apartment per his brothers. He is   Normally alert and oriented but is not close to his family. He presented with altered mental status and Severe  Sepsis picture. He was admitted and we were consulted to manage these issues    Interval history / Subjective:     Patient remains critically ill with mitral and tricuspid valve endocarditis with persistent MRSA bacteremia , acute respiratory failure, septic shock, TERRELL, s/p TV Pacer, s/p cardiac arrest. Patient continues to worsen clinically, now with spiking fever to 102. Palliative care meeting this afternoon and family has made patient DNR , they would like to hold off on compassionate extubation for two days until his wife , who is in [facility], can come to possibly visit him.       Assessment & Plan:     Severe Sepsis with multisystem organ dysfunction in the setting of staph Endocarditis(POA)  Leukocytosis, Fever, Tachycardia, Hypotension, Elevated Lactate  Vanc, Levaquin and Zosyn since , now Vanc and Zosyn as per ID  Paired blood cultures with MRSA, UA  Chest CT with multiple cavitary lesions?or septic emboli, more likely  Lactate trended down 2.1  CT of abdomen ok, skin examined again and noted weeping to B/L LE  Echo with EF 55-60%, NRWMA, MV vegetation, TV vegetation  CT Surgery and ID following  HIV test negative    Acute Renal Failure  Renal US ok and s/s of medical renal disease  Kidney function elevated but stable at 1.73  Marginal UO  Nephrology following    Acute Metabolic Encephalopathy  Head CT with Subtle area of hyperattenuation in the right posterior parietal  Lobe, concern for septic emboli  Brain MRI ordered but cannot get this due to temporary pacemaker  Neurology on board    Thrombocytopenia  Likely reactive but concerned for DIC vs Liver Dysfunction  Slight improvement today    Paroxysmal Atrial Fibrillation  Converted to NSR on 5/13 on Amio drip   Coagulation contraindicated with platelet function  EKG reviewed with atrial fibrillation and inferior lead T wave abnormality, QT prolonged on 5/11 but appears sinus at present on temp pacer    Elevated Troponin, s/p cardiac arrest, Acute Diastolic Heart Failure  Likely has CAD vs demand ischemia- defer to Cards  temporary pacemaker in place  Repeat Echo with EF 55% with severe MV regurgitation  Dobutamine drip  IV Bumex    Substance Abuse  Heroine and Cocaine abuse  Family unsure of Tobacco or ETOH use    Hepatitis C  HIV negative  AST and T Bili elevated, May be playing a role in thrombocytopenia    Abnormal CT imaging  Cavitary lesions bilaterally, PPD -, AFB culture sent  Airborne precautions dc'd per ID  Pulm following and no indication for bronch at this time    Code status: DNR , decided today by siblings and palliative care team. They will consider compassionate extubation in the next two days. DVT prophylaxis: SCDs due to thrombocytopenia    Care Plan discussed with: RN, Attending and patients sister at bedside   Disposition: D     Hospital Problems  Date Reviewed: 5/9/2018          Codes Class Noted POA    Shock ([facility] [address] ICD-10-CM: R57.9  ICD-9-CM: 785.50  5/9/2018 Unknown        * (Principal)Sepsis ([facility] [address] ICD-10-CM: A41.9  ICD-9-CM: 038.9, 995.91  5/9/2018 Unknown                Review of Systems:   Review of systems not obtained due to patient factors. Vital Signs:    Last 24hrs VS reviewed since prior progress note.  Most recent are:  Visit Vitals    BP 96/56    Pulse 77    Temp (!) 102.2 °F (39 °C)    Resp 25    Ht 6' (1.829 m)    Wt 85.4 kg (188 lb 4.4 oz)    SpO2 94%    BMI 25.53 kg/m2         Intake/Output Summary (Last 24 hours) at 05/14/18 1712  Last data filed at 05/14/18 1600   Gross per 24 hour   Intake          4261.54 ml   Output             4355 ml   Net           -93.46 ml        Physical Examination:             Constitutional:  Intubated and Sedated   ENT:  ETT, MM dry. NGT in place. Right SC CVC   Resp:  Vented. Coarse BS throughout. No accessory muscle use   CV:  Regular rhythm, Sinus tachy on tele, 3/6 systolic murmur    GI:  Soft, non distended, non tender. + BS    Musculoskeletal:  + Anasarca with gross edema to all extremities. Restraints in place    Neurologic:  RASS -3      Skin:  Weeping wounds to B/L LE where skin is cracking due to anasarca. Hematologic/Lymphatic/Immunlogic:  No jaundice nor lymph node swelling  :  Normal genitalia. Data Review:    Review and/or order of clinical lab test  Review and/or order of tests in the radiology section of [facility]      Labs:     Recent Labs      05/14/18   0501 05/13/18   0517   WBC  29.1*  25.1*   HGB  11.9*  11.5*   HCT  37.0  36.0*   PLT  43*  32*     Recent Labs      05/14/18   0501 05/13/18   0517  05/12/18   0555   NA  151*  149*  150*   K  4.4  4.4  4.3   CL  121*  117*  120*   CO2  23  23  23   BUN  58*  63*  76*   CREA  1.70*  1.73*  1.77*   GLU  164*  180*  177*   CA  7.4*  7.0*  7.1*   MG  2.2  2.7*  3.4*   PHOS  2.8  3.9  4.6     Recent Labs      05/14/18   0501 05/13/18   0517  05/12/18   0555   SGOT  162*  143*  114*   ALT  80*  62  53   AP  65  59  73   TBILI  4.0*  2.8*  3.7*   TP  6.4  5.9*  6.2*   ALB  1.5*  1.4*  1.5*   GLOB  4.9*  4.5*  4.7*     Recent Labs      05/13/18   0517  05/12/18   0555   INR  1.4*  1.5*   PTP  14.5*  15.7*      No results for input(s): FE, TIBC, PSAT, FERR in the last 72 hours. No results found for: FOL, RBCF   No results for input(s): PH, PCO2, PO2 in the last 72 hours.   No results for input(s): CPK, CKNDX, TROIQ in the last 72 hours. No lab exists for component: CPKMB  Lab Results   Component Value Date/Time    Cholesterol, total <50 05/10/2018 04:32 AM    HDL Cholesterol 9 05/10/2018 04:32 AM    LDL, calculated  05/10/2018 04:32 AM     Unable to calculate LDL or VLDL due to low cholesterol.     Triglyceride 173 (H) 05/10/2018 04:32 AM    CHOL/HDL Ratio Cannot be calculated 05/10/2018 04:32 AM     Lab Results   Component Value Date/Time    Glucose (POC) 112 (H) 05/11/2018 05:33 AM    Glucose (POC) 157 (H) 05/09/2018 12:51 PM    Glucose (POC) 96 05/09/2018 05:45 AM    Glucose (POC) 110 (H) 05/09/2018 05:03 AM     Lab Results   Component Value Date/Time    Color DARK YELLOW 05/09/2018 03:20 AM    Appearance CLOUDY (A) 05/09/2018 03:20 AM    Specific gravity 1.016 05/09/2018 03:20 AM    pH (UA) 5.5 05/09/2018 03:20 AM    Protein 30 (A) 05/09/2018 03:20 AM    Glucose NEGATIVE  05/09/2018 03:20 AM    Ketone NEGATIVE  05/09/2018 03:20 AM    Bilirubin SMALL (A) 11/25/2014 01:52 PM    Urobilinogen 1.0 05/09/2018 03:20 AM    Nitrites NEGATIVE  05/09/2018 03:20 AM    Leukocyte Esterase SMALL (A) 05/09/2018 03:20 AM    Epithelial cells FEW 05/09/2018 03:20 AM    Bacteria NEGATIVE  05/09/2018 03:20 AM    WBC 0-4 05/09/2018 03:20 AM    RBC 5-10 05/09/2018 03:20 AM         Medications Reviewed:     Current Facility-Administered Medications   Medication Dose Route Frequency    DOBUTamine (DOBUTREX) 500 MG/250 mL (2000 mcg/mL) in D5W infusion  0-10 mcg/kg/min IntraVENous TITRATE    sodium chloride 0.9 % bolus infusion 100 mL  100 mL IntraVENous RAD ONCE    iopamidol (ISOVUE-370) 76 % injection 100 mL  100 mL IntraVENous RAD ONCE    sodium chloride (NS) flush 10 mL  10 mL IntraVENous RAD ONCE    balsam peru-castor oil (VENELEX)  mg/gram ointment   Topical Q8H    acetaminophen (TYLENOL) solution 650 mg  650 mg Per NG tube Q4H PRN    vancomycin (VANCOCIN) 1250 mg in  ml infusion 1,250 mg IntraVENous Q18H    amiodarone (CORDARONE) 375 mg/250 mL D5W infusion  0.5-1 mg/min IntraVENous TITRATE    bumetanide (BUMEX) injection 1 mg  1 mg IntraVENous BID    propofol (DIPRIVAN) infusion  0-30 mcg/kg/min IntraVENous TITRATE    fentaNYL (PF) 900 mcg/30 ml infusion soln  0-200 mcg/hr IntraVENous TITRATE    albuterol-ipratropium (DUO-NEB) 2.5 MG-0.5 MG/3 ML  3 mL Nebulization Q6H RT    chlorhexidine (PERIDEX) 0.12 % mouthwash 15 mL  15 mL Oral BID    pantoprazole (PROTONIX) 40 mg in sodium chloride 0.9% 10 mL injection  40 mg IntraVENous DAILY    PHENYLephrine (PF)(PATSY-SYNEPHRINE) 30 mg in 0.9% sodium chloride 250 mL infusion   mcg/min IntraVENous TITRATE    acetaminophen (TYLENOL) suppository 650 mg  650 mg Rectal Q6H PRN    sodium chloride (NS) flush 5-10 mL  5-10 mL IntraVENous Q8H    sodium chloride (NS) flush 5-10 mL  5-10 mL IntraVENous PRN    sodium chloride (NS) flush 5-10 mL  5-10 mL IntraVENous PRN    piperacillin-tazobactam (ZOSYN) 3.375 g in 0.9% sodium chloride (MBP/ADV) 100 mL  3.375 g IntraVENous Q8H    LORazepam (ATIVAN) injection 2 mg  2 mg IntraVENous Q4H PRN    Vancomycin Pharmacy Dosing   Other PRN    sodium chloride (NS) flush 20 mL  20 mL InterCATHeter PRN    sodium chloride (NS) flush 10 mL  10 mL InterCATHeter Q24H    sodium chloride (NS) flush 10 mL  10 mL InterCATHeter PRN    sodium chloride (NS) flush 10 mL  10 mL InterCATHeter Q8H    alteplase (CATHFLO) 1 mg in sterile water (preservative free) 1 mL injection  1 mg InterCATHeter PRN     ______________________________________________________________________  EXPECTED LENGTH OF STAY: 4d 21h  ACTUAL LENGTH OF STAY:          [address], NP

## 2018-05-14 NOTE — PROGRESS NOTES
Bedside and Verbal shift change report given to Isela Villareal 86 by [facility]. Report included the following information SBAR, Kardex, MAR, Recent Results and Cardiac Rhythm SR/Paced. 0800: Removed restraints from pt's upper extremities. Will reevaluate pt's need     0900: Spoke with pt's nieces and updated MDs. Pt family meeting at 56 today to address comfprt measures     1450: CTA on hold until tomorrow per Dr Kita Hodgkin. 1500: Pt's family meeting with palliative. Decision made to make pt DNR. Compassionate extubation for Wednesday 1930: Bedside and Verbal shift change report given to [facility] by Tyshawn Jenkins RN.  Report included the following information SBAR, Kardex, MAR, Recent Results and Cardiac Rhythm SR.

## 2018-05-14 NOTE — PROGRESS NOTES
Problem: Pressure Injury - Risk of  Goal: *Prevention of pressure injury  Document Darwin Scale and appropriate interventions in the flowsheet. Offload heels  Turn approximately every 2 hours   Outcome: Progressing Towards Goal  Pressure Injury Interventions:  Sensory Interventions: Assess changes in LOC, Discuss PT/OT consult with provider, Pressure redistribution bed/mattress (bed type), Turn and reposition approx. every two hours (pillows and wedges if needed)    Moisture Interventions: Apply protective barrier, creams and emollients, Maintain skin hydration (lotion/cream)    Activity Interventions: Pressure redistribution bed/mattress(bed type), PT/OT evaluation    Mobility Interventions: PT/OT evaluation, Pressure redistribution bed/mattress (bed type), Turn and reposition approx. every two hours(pillow and wedges)    Nutrition Interventions: Document food/fluid/supplement intake    Friction and Shear Interventions: Apply protective barrier, creams and emollients, Transferring/repositioning devices               Problem: Falls - Risk of  Goal: *Absence of Falls  Document Jer Fall Risk and appropriate interventions in the flowsheet.    Outcome: Progressing Towards Goal  Fall Risk Interventions:  Mobility Interventions: Communicate number of staff needed for ambulation/transfer, Patient to call before getting OOB    Mentation Interventions: Door open when patient unattended, Evaluate medications/consider consulting pharmacy, More frequent rounding    Medication Interventions: Evaluate medications/consider consulting pharmacy, Patient to call before getting OOB    Elimination Interventions: Call light in reach, Patient to call for help with toileting needs, Toileting schedule/hourly rounds             Problem: Non-Violent Restraints  Goal: *Removal from restraints as soon as assessed to be safe  Outcome: Progressing Towards Goal  Removed    Problem: Nutrition Deficit  Goal: *Optimize nutritional status  Outcome: Progressing Towards Goal  TF

## 2018-05-14 NOTE — PROGRESS NOTES
[facility]  Pulmonary, Critical Care, and Sleep Medicine  Name: Raoul Garcia MRN: [id number]   : 1960 [facility]   Date: 2018          Impression Plan   1. IVDU ( cocaine/opiates) with Mitral and tricuspid valve endocarditis with MRSA and persistent MRSA bacteremia  2. Acute resp failure- multiple MRSA cavitary large b/l septic pulmonary emboli with new left lung ATX- likely from central plug. 3. Septic shock - ECHO 5/10/18 EF 55% RV nml large TV and MV veggies( mobile)- severe TR/MR  4. TERRELL  5. S/p TV pacer 5/10/18  6. Anemia/thrombocytopenia  7. PAF  8. S/p cardiac arrest ( asystolic)   9. Hep C  10. COPD 1. Prognosis extremely poor. 2. Now with new left SAH- suspect from cardioemobolic process. 3. I agree with consideration of comfort care and compassionate withdrawal of care. 4. Continue abx per ID   5. Lung protective ventilation  6. Palliative to meet with family today. Again, I strongly recommend consideration of comfort pathway. 7. D/w ID attending   Pt is critically ill . Pt is clinically worse . CCT EOP 30 min.  At risk for decline due to sepsis/MODS    Medical Decision Making Today  · I have reviewed the flowsheet and previous days notes  · One or more chronic illnesses with severe exacerbation, progression or side effects of treatment  · Acute or chronic illness that poses a threat to life or bodily function  · Abrupt change in neurologic status  · Drug therapy requiring intensive monitoring for toxicity  · Review and order of Clinical lab tests  · Review and Order of Radiology tests  · Review and Order of Medicine tests  · Discuss test results with performing / interpreting MD  · Discuss case with Specialist MD  · Independent visualization of Image  · Review and summarize records or history fromprevious days notes  · Review and summarize records or history from outside facility  · Reviewed Ventilator / NiPPV  · I have personally reviewed the patients ECG / [address]       Radiology  ( personally reviewed) Kylie Guajardo with complete opacification left hemithorax with shift mediastinum to left. ABG Recent Labs      05/11/18   0926   PHI  7.459*   PO2I  248*   PCO2I  29.3*          Subjective/Interval History:   I have reviewed the flowsheet and previous days notes. Review of systems not obtained due to patient factors. Obtunded on vent RASS -4. Does not follow commands.       Objective:     Mode Rate Tidal Volume Pressure FiO2 PEEP   Assist control, Volume control   5002 ml    40 % 5 cm H20     Vital Signs:    Ventilator Pressures  PIP Observed (cm H2O): 15 cm H2O  Plateau Pressure (cm H2O): 19 cm H2O  MAP (cm H2O): 6.6  PEEP/VENT (cm H2O): 5 cm H20  Auto PEEP Observed (cm H2O): 0.2 cm [id number](24)Ventilator Pressures  PIP Observed (cm H2O): 15 cm H2O  Plateau Pressure (cm H2O): 19 cm H2O  MAP (cm H2O): 6.6  PEEP/VENT (cm H2O): 5 cm H20  Auto PEEP Observed (cm H2O): 0.2 cm H2O  Safety & Alarms  Circuit Temperature: 98.4 °F (36.9 °C)  Backup Mode Checked/Apnea: Yes  Pressure Max: 40 cm H2O  Ve Min: 2  Ve Max: 20  Vt Min: 200 ml  Vt Max: 1000 ml  RR Max: 40  Intake/Output:   Last shift:      Ventilator Volumes  Vt Set (ml): 5002 ml  Vt Exhaled (Machine Breath) (ml): 530 ml  Ve Observed (l/min): 11.6 l/min  Last 3 shifts:  RRIOLAST3  Intake/Output Summary (Last 24 hours) at 05/14/18 0906  Last data filed at 05/14/18 0700   Gross per 24 hour   Intake          3965.34 ml   Output             4005 ml   Net           -39.66 ml     EXAM:   GENERAL: well developed and in no distress, HEENT:  PERRL, EOMI, no alar flaring or epistaxis, oral mucosa moist without cyanosis, NECK:  no jugular vein distention, no retractions, no thyromegaly or masses, LUNGS: decreased breath sounds billaterally and with rhonchi , HEART:  Regular rate and rhythm with no MGR; no edema is present, ABDOMEN:  soft with no tenderness, bowel sounds present, EXTREMITIES:  warm with no cyanosis and SKIN:  no jaundice or ecchymosis        Data    I have personally reviewed data, flowsheets for the last 24 hours.         Labs:  Recent Labs      05/14/18   0501  05/13/18   0517  05/12/18   0555   WBC  29.1*  25.1*  28.1*   HGB  11.9*  11.5*  12.1   HCT  37.0  36.0*  36.9   PLT  43*  32*  31*     Recent Labs      05/14/18   0501  05/13/18   0517  05/12/18   0555   NA  151*  149*  150*   K  4.4  4.4  4.3   CL  121*  117*  120*   CO2  23  23  23   GLU  164*  180*  177*   BUN  58*  63*  76*   CREA  1.70*  1.73*  1.77*   CA  7.4*  7.0*  7.1*   MG  2.2  2.7*  3.4*   PHOS  2.8  3.9  4.6   ALB  1.5*  1.4*  1.5*   SGOT  162*  143*  114*   ALT  80*  62  53   INR   --   1.4*  1.5*       Jeremy Velásquez MD  [facility] [address]

## 2018-05-14 NOTE — PROGRESS NOTES
NAME: Tamela Marin        :  1960        MRN:  [id number]        Assessment :    Plan:  --TERRELL- ATN due to sepsis/arrest  Hypernatremia  Sepsis  Cavitary lung lesions  IVDA  AMS  Endocarditis/severe MR and TR  S/P lobo arrest 5/10- s/p TVP on  -Cr marginally better again today-> 1.7mg/dl  Na up to 151-> increase FW via NGT to 300 ml every 4 hrs  Ct ABx  Ct IV Bumex  Pressors as needed    On vent. Critically ill. Poor prognosis  Palliative care meeting later today       Subjective:     Chief Complaint:  Febrile. On vent; sedated. New left SAH on CT Head    Review of Systems:     Could not obtain due to: On vent     Objective:     VITALS:   Last 24hrs VS reviewed since prior progress note. Most recent are:  Visit Vitals    /64    Pulse 74    Temp (!) 101.5 °F (38.6 °C)    Resp 25    Ht 6' (1.829 m)    Wt 85.4 kg (188 lb 4.4 oz)    SpO2 94%    BMI 25.53 kg/m2       Intake/Output Summary (Last 24 hours) at 18 1335  Last data filed at 18 1202   Gross per 24 hour   Intake          4343.17 ml   Output             4355 ml   Net           -11.83 ml      Telemetry Reviewed:     PHYSICAL EXAM:  General: Intubated. On vent.   Dec BS at bases  RRR, not lobo at present  1+ edema  Venous stasis changes+  Sedated on vent      Lab Data Reviewed: (see below)    Medications Reviewed: (see below)    PMH/SH reviewed - no change compared to H&P  ________________________________________________________________________  Care Plan discussed with:  Patient     Family      RN y    Care Manager                    Consultant:          Comments   >50% of visit spent in counseling and coordination of care       ________________________________________________________________________  Alcides Piper MD     Procedures: see electronic medical records for all procedures/Xrays and details which  were not copied into this note but were reviewed prior to creation of Plan. LABS:  Recent Labs      05/14/18   0501  05/13/18   0517   WBC  29.1*  25.1*   HGB  11.9*  11.5*   HCT  37.0  36.0*   PLT  43*  32*     Recent Labs      05/14/18   0501  05/13/18   0517  05/12/18   0555   NA  151*  149*  150*   K  4.4  4.4  4.3   CL  121*  117*  120*   CO2  23  23  23   BUN  58*  63*  76*   CREA  1.70*  1.73*  1.77*   GLU  164*  180*  177*   CA  7.4*  7.0*  7.1*   MG  2.2  2.7*  3.4*   PHOS  2.8  3.9  4.6     Recent Labs      05/14/18   0501 05/13/18   0517  05/12/18   0555   SGOT  162*  143*  114*   AP  65  59  73   TP  6.4  5.9*  6.2*   ALB  1.5*  1.4*  1.5*   GLOB  4.9*  4.5*  4.7*     Recent Labs      05/13/18   0517  05/12/18   0555   INR  1.4*  1.5*   PTP  14.5*  15.7*      No results for input(s): FE, TIBC, PSAT, FERR in the last 72 hours. No results found for: FOL, RBCF   No results for input(s): PH, PCO2, PO2 in the last 72 hours. No results for input(s): CPK, CKMB in the last 72 hours.     No lab exists for component: TROPONINI  No components found for: [place]  Lab Results   Component Value Date/Time    Color DARK YELLOW 05/09/2018 03:20 AM    Appearance CLOUDY (A) 05/09/2018 03:20 AM    Specific gravity 1.016 05/09/2018 03:20 AM    pH (UA) 5.5 05/09/2018 03:20 AM    Protein 30 (A) 05/09/2018 03:20 AM    Glucose NEGATIVE  05/09/2018 03:20 AM    Ketone NEGATIVE  05/09/2018 03:20 AM    Bilirubin SMALL (A) 11/25/2014 01:52 PM    Urobilinogen 1.0 05/09/2018 03:20 AM    Nitrites NEGATIVE  05/09/2018 03:20 AM    Leukocyte Esterase SMALL (A) 05/09/2018 03:20 AM    Epithelial cells FEW 05/09/2018 03:20 AM    Bacteria NEGATIVE  05/09/2018 03:20 AM    WBC 0-4 05/09/2018 03:20 AM    RBC 5-10 05/09/2018 03:20 AM       MEDICATIONS:  Current Facility-Administered Medications   Medication Dose Route Frequency    DOBUTamine (DOBUTREX) 500 MG/250 mL (2000 mcg/mL) in D5W infusion  0-10 mcg/kg/min IntraVENous TITRATE    sodium chloride 0.9 % bolus infusion 100 mL  100 mL IntraVENous RAD ONCE    iopamidol (ISOVUE-370) 76 % injection 100 mL  100 mL IntraVENous RAD ONCE    sodium chloride (NS) flush 10 mL  10 mL IntraVENous RAD ONCE    vancomycin (VANCOCIN) 1250 mg in  ml infusion  1,250 mg IntraVENous Q18H    amiodarone (CORDARONE) 375 mg/250 mL D5W infusion  0.5-1 mg/min IntraVENous TITRATE    bumetanide (BUMEX) injection 1 mg  1 mg IntraVENous BID    propofol (DIPRIVAN) infusion  0-30 mcg/kg/min IntraVENous TITRATE    fentaNYL (PF) 900 mcg/30 ml infusion soln  0-200 mcg/hr IntraVENous TITRATE    albuterol-ipratropium (DUO-NEB) 2.5 MG-0.5 MG/3 ML  3 mL Nebulization Q6H RT    chlorhexidine (PERIDEX) 0.12 % mouthwash 15 mL  15 mL Oral BID    pantoprazole (PROTONIX) 40 mg in sodium chloride 0.9% 10 mL injection  40 mg IntraVENous DAILY    PHENYLephrine (PF)(PATSY-SYNEPHRINE) 30 mg in 0.9% sodium chloride 250 mL infusion   mcg/min IntraVENous TITRATE    acetaminophen (TYLENOL) suppository 650 mg  650 mg Rectal Q6H PRN    sodium chloride (NS) flush 5-10 mL  5-10 mL IntraVENous Q8H    sodium chloride (NS) flush 5-10 mL  5-10 mL IntraVENous PRN    sodium chloride (NS) flush 5-10 mL  5-10 mL IntraVENous PRN    piperacillin-tazobactam (ZOSYN) 3.375 g in 0.9% sodium chloride (MBP/ADV) 100 mL  3.375 g IntraVENous Q8H    LORazepam (ATIVAN) injection 2 mg  2 mg IntraVENous Q4H PRN    Vancomycin Pharmacy Dosing   Other PRN    sodium chloride (NS) flush 20 mL  20 mL InterCATHeter PRN    sodium chloride (NS) flush 10 mL  10 mL InterCATHeter Q24H    sodium chloride (NS) flush 10 mL  10 mL InterCATHeter PRN    sodium chloride (NS) flush 10 mL  10 mL InterCATHeter Q8H    alteplase (CATHFLO) 1 mg in sterile water (preservative free) 1 mL injection  1 mg InterCATHeter PRN

## 2018-05-14 NOTE — PROGRESS NOTES
Palliative family meeting with 6/8 siblings, next of kin , family shared today , patient is  and his wife is incarcerated for long time in [address] [place] [address] . Today they agree for change of code status to DNAR , if his heart stops, we discussed compassionate extubation , family is leaning towards comfort care , however would like wait for next two days , to get some family and friends for closure , we agreed to follow up on Wednesday, family will try to contact [address] [place] [address] longterm for his wife to visit . Family agrees oldest brother Neyda Vann to be the contact point for medical team  # [phone]. D/w bed side Sanya Bread , DNR order placed  in EMR and pink sheet placed in chart . Follow up note to follow .

## 2018-05-15 NOTE — PROGRESS NOTES
Cardiology  Events noted with plans to withdraw care tomorrow.   TP will be discontinued at that time  Discussed with his nurse, Janice Ceja

## 2018-05-15 NOTE — PROGRESS NOTES
CSS Update:    Plans to withdraw care tomorrow per family. Palliative following, pt DNR. Agree with this plan, will sign off per Dr. Daryle Caller.

## 2018-05-15 NOTE — INTERDISCIPLINARY ROUNDS
IDR/SLIDR Summary          Patient: Anurag Ward MRN: [id number]    Age: 62 y.o. YOB: 1960 Room/Bed: [address] [place] [address]   Admit Diagnosis: Sepsis ([facility]  Shock ([facility]  Principal Diagnosis: Sepsis ([facility]   Goals: Afebrile,stable BP & HR  Readmission: NO  Quality Measure: SEPSIS  VTE Prophylaxis: Mechanical  Influenza Vaccine screening completed? NO  Pneumococcal Vaccine screening completed? NO  Mobility needs: No   Nutrition plan:Yes  Consults:P.T, O.T. and Respiratory    Financial concerns:Yes  Escalated to CM? YES  RRAT Score: 7   Interventions:H2H  Testing due for pt today?  YES  LOS: 6 days Expected length of stay TBD days  Discharge plan: TBD   PCP: None  Transportation needs: No    Days before discharge:two or more days before discharge   Discharge disposition: Home    Signed:     Roel Shah  5/15/2018  1:39 AM

## 2018-05-15 NOTE — PROGRESS NOTES
21:15 - 21:45 (30)    Endocarditis (mitral valve and tricuspid valve; MRSA)  Severe MR and severe TR  Sepsis  Encephalopathy  Acute renal injury  Thrombocytopenia  Acute liver injury  Hep C  IVDA (cocaine, heroin)  Hypoxic respiratory failure  Paroxysmal A-fib  Complete heart block (temporary V wire)  Recent stroke (septic emboli; hemorrhagic conversion)     Still on vent    Still having fevers    Made DNR this afternoon     Palliative care meeting today - continue Tx for now    Still on Dobutamine / neosynephrine     CXR - complete left lung collapse        Intake/Output Summary (Last 24 hours) at 05/14/18 2129  Last data filed at 05/14/18 1600   Gross per 24 hour   Intake          3333.48 ml   Output             3430 ml   Net           -96.52 ml       Neuro - intubated and sedated; propofol     Cardiovascular - severe MR, severe TR; BP 90/60's; 's     Pulmonary - pulmonary infiltrates (septic emboli), left lung collapse; Vent - PEEP 5, PS 10, TV's 500's, FiO2 40%; ABG 7.46 / 29 / 248 / 21 / -3     Renal - acute kidney injury; BUN 58, creatinine 1.70; Bumex 1 mg IV BID     GI - acute liver injury; , ALT 80, alk phos 65, bilirubin 4.0     Heme - thrombocytopenia; Hct 37, platelets 43     ID - WBC 29; blood cultures - MRSA; Vanc / Zosyn     F/E - BUN 58, creatinine 1.70     M/S - moves extremities although not purposeful     Endocrine - no acute issues     Psych - unable to assess     Prophylaxis - protonix      Risk of morbidity and mortality - high  Medical decision making - high complexity     Total critical care time - 30 minutes (CPT 04693)

## 2018-05-15 NOTE — PROGRESS NOTES
Bedside and Verbal shift change report given to Isela [address] by Zahida Edwards. Report included the following information SBAR, Kardex, [facility], Recent Results and Cardiac Rhythm Paced. 1400: Spoke with Palliative about time for compassionate extubation     1930: Bedside and Verbal shift change report given to Eleon[facility] by Umang Montes RN. Report included the following information SBAR, Kardex, [facility], Recent Results and Cardiac Rhythm Paced.

## 2018-05-15 NOTE — PROGRESS NOTES
Problem: Pressure Injury - Risk of  Goal: *Prevention of pressure injury  Document Darwin Scale and appropriate interventions in the flowsheet. Offload heels  Turn approximately every 2 hours   Outcome: Progressing Towards Goal  Pressure Injury Interventions:  Sensory Interventions: Monitor skin under medical devices, Pressure redistribution bed/mattress (bed type), Turn and reposition approx. every two hours (pillows and wedges if needed)    Moisture Interventions: Apply protective barrier, creams and emollients, Moisture barrier    Activity Interventions: Pressure redistribution bed/mattress(bed type)    Mobility Interventions: Pressure redistribution bed/mattress (bed type), Turn and reposition approx. every two hours(pillow and wedges)    Nutrition Interventions: Document food/fluid/supplement intake    Friction and Shear Interventions: Apply protective barrier, creams and emollients, Transferring/repositioning devices               Problem: Falls - Risk of  Goal: *Absence of Falls  Document Jer Fall Risk and appropriate interventions in the flowsheet.    Outcome: Progressing Towards Goal  Fall Risk Interventions:  Mobility Interventions: Strengthening exercises (ROM-active/passive)    Mentation Interventions: Evaluate medications/consider consulting pharmacy    Medication Interventions: Evaluate medications/consider consulting pharmacy    Elimination Interventions: Patient to call for help with toileting needs

## 2018-05-15 NOTE — PROGRESS NOTES
[facility] ICU Progress Note    Admit Date: 2018    Following for mitral/tricuspid valve endocarditis     Subjective:   Pt seen with Dr. Rashad Bauer. Tmax 102.4. Maintained on dobut, katarina, propofol/fentanyl, amio. Family meeting yesterday with palliative. Patient is now DNR. Family is considering transition to comfort care in the next 2 days. Objective:   Vitals:  Blood pressure 92/62, pulse 69, temperature 100.4 °F (38 °C), resp. rate 19, height 6' (1.829 m), weight 165 lb 3.2 oz (74.9 kg), SpO2 100 %. Temp (24hrs), Av.5 °F (38.1 °C), Min:99.1 °F (37.3 °C), Max:102.4 °F (39.1 °C)    EKG/Rhythm:  paced    Ventilator:  Ventilator Volumes  Vt Set (ml): 500 ml (05/15/18 0920)  Vt Exhaled (Machine Breath) (ml): 521 ml (05/15/18 0920)  Ve Observed (l/min): 11.5 l/min (05/15/18 0920)    Oxygen Therapy:  Oxygen Therapy  O2 Sat (%): 100 % (05/15/18 0920)  Pulse via Oximetry: 69 beats per minute (05/15/18 0920)  O2 Device: Endotracheal tube (05/15/18 0920)  O2 Flow Rate (L/min): 40 l/min (05/10/18 2000)  FIO2 (%): 50 % (05/15/18 0920)    CXR: IMPRESSION: Complete opacity of the left hemithorax with mediastinal shift to  the left consistent with mucous plugging and collapse. Admission Weight: Last Weight   Weight: 178 lb 9.6 oz (81 kg) Weight: 165 lb 3.2 oz (74.9 kg) (new bed)     Intake / Output / Drain:  Current Shift: 05/15 07 - 05/15 1900  In: 381.8 [I.V.:81.8]  Out: 200 [Urine:200]  Last 24 hrs.:     Intake/Output Summary (Last 24 hours) at 05/15/18 0952  Last data filed at 05/15/18 0800   Gross per 24 hour   Intake           4464.2 ml   Output             3425 ml   Net           1039.2 ml       EXAM:  General:  Sedated, on vent. Lungs:   Clear right, diminished left to auscultation    Incision:  No erythema, drainage or swelling. Heart:  Regular rate and rhythm, S1, S2 normal, + murmur, click, rub or gallop.    Abdomen: Soft, non-tender. Bowel sounds normal. No masses,  No organomegaly. Extremities:  2+ edema. PPP. Neurologic:  unable to assess. Labs:   Recent Labs      05/15/18   0318  18   204   WBC  27.5*   --    HGB  11.6*   --    HCT  36.3*   --    PLT  54*   --    NA  155*   --    K  4.2   --    BUN  60*   --    CREA  1.63*   --    GLU  184*   --    GLUCPOC   --   166*   INR  1.3*   --         Assessment:     Principal Problem:    Sepsis ([facility] (2018)    Active Problems:    Shock ([facility] (2018)         Plan/Recommendations/Medical Decision Makin. Sepsis with endocarditis of mitral and tricuspid valves: Febrile, WBC 27.5,  BC and sputum + MRSA, ID following, cont abx per ID, would need 6-8 weeks of antibiotics prior to consideration of surgery per [facility]. Family considering comfort care. Patient now DNR      2. Encephalopathy: monitor      3. Acute renal failure:  Cr 1.7, Nephrology following, monitor U/O      4. Thrombocytopenia: Plt 54, monitor      5. Hep C/Elevated liver enzymes: Tbili 4., , ALT 80 cont to monitor      6. Hx IVDA: cocaine and heroin abuse, HIV negative      7. Hx HTN: monitor      8. Respiratory failure: pulmonary following. CXR with multiple cavitary lung lesions, septic empoli vs. Tumor vs. TB workup PPD negative, AFB pending. Off airborne precautions.      9. Elevated troponin: cardiology following     10. Paroxysmal Afib: Currently paced via transvenous pacemaker. On amio gtt    11. Hypernatremia: Free water via NG per nephrology    12. SAH: CT showing new SAH, neuro following, unable to do MRI dt transvenous pacemaker, monitor neuro status. Dispo: antibiotics per ID. Palliative coordinating disposition with family over the next 2 days, possible comfort care. Signed By: ZEYNEP Mcintosh     Saw patient, agree with above  Risk of morbidity and mortality - high  Medical decision making - high complexity    Medical decision making     1.  Sepsis with endocarditis of mitral and tricuspid valves: Abx's      2. Encephalopathy: monitor      3. Acute renal failure:   monitor        4. Thrombocytopenia:  monitor      5. Hep C/Elevated liver enzymes:  monitor      6. Hx IVDA: cocaine and heroin abuse, HIV negative      7. Hx HTN: monitor      8. Respiratory failure: cont vent      9. Elevated troponin: monitor     10. Paroxysmal Afib:  amio gtt    11. Hypernatremia: Free water     12.  SAH: monitor

## 2018-05-15 NOTE — PROGRESS NOTES
PCCM    Noted family's decision to withdraw care tomorrow. Palliative medicine following (Thanks!).  DNR and no escalation of care planned  We will be available to help as needed    Evan Salazar MD

## 2018-05-15 NOTE — PROGRESS NOTES
1930 Bedside shift change report given to [address] (oncoming nurse) by Kemal Blackman RN (offgoing nurse). Report included the following information SBAR, Intake/Output, MAR, Accordion, Recent Results and Cardiac Rhythm Paced. 2200 placed pt on [facility] w/ [facility] bed    0300 pt NG tube pulled out, replaced with 16F Orogastric Tube, will order KUB. 0330 labs sent per order    [phone] CXR & KUB complete    0730 Bedside shift change report given to [facility] (oncoming nurse) by Tammy Hernandez RN (offgoing nurse). Report included the following information SBAR, Intake/Output, Accordion, Recent Results and Cardiac Rhythm Paced.

## 2018-05-15 NOTE — PROGRESS NOTES
Hospitalist Progress Note  Joya Torres NP  Answering service: [phone] from in house phone  Cell: [phone]      Date of Service:  5/15/2018  NAME:  Stephanie Belcher  :  1960  MRN:  [id number]      Admission Summary:   62year old with PMH of COPD, HTN, and Hep C with significant forty plus year drug abuse history. The patient lives in assisted living at [address] [place] [address] in an independent apartment per his brothers. He is   Normally alert and oriented but is not close to his family. He presented with altered mental status and Severe  Sepsis picture. He was admitted and we were consulted to manage these issues    Interval history / Subjective:     Patient remains critically ill with mitral and tricuspid valve endocarditis with persistent MRSA bacteremia , acute respiratory failure, septic shock, TERRELL, s/p TV Pacer, s/p cardiac arrest. Patient continues to worsen clinically, now with spiking fever to 102. Palliative care meeting on  and family has made patient DNR. Plan for compassionate extubation / withdrawal of care tomorrow .       Assessment & Plan:     Severe Sepsis with multisystem organ dysfunction in the setting of staph Endocarditis(POA)  Leukocytosis, Fever, Tachycardia, Hypotension, Elevated Lactate  Vanc, Levaquin and Zosyn since , now Vanc and Zosyn as per ID  Paired blood cultures with MRSA, UA  Chest CT with multiple cavitary lesions?or septic emboli, more likely  Lactate trended down 2.1  CT of abdomen ok, skin examined again and noted weeping to B/L LE  Echo with EF 55-60%, NRWMA, MV vegetation, TV vegetation  CT Surgery and ID following  HIV test negative    Acute Renal Failure  Renal US ok and s/s of medical renal disease  Kidney function elevated but stable at 1.73  Marginal UO  Nephrology following    Acute Metabolic Encephalopathy  Head CT with Subtle area of hyperattenuation in the right posterior parietal  Lobe, concern for septic emboli  Brain MRI ordered but cannot get this due to temporary pacemaker  Neurology on board    Thrombocytopenia  Likely reactive but concerned for DIC vs Liver Dysfunction  Slight improvement today    Paroxysmal Atrial Fibrillation  Converted to NSR on 5/13 on Amio drip   Coagulation contraindicated with platelet function  EKG reviewed with atrial fibrillation and inferior lead T wave abnormality, QT prolonged on 5/11 but appears sinus at present on temp pacer    Elevated Troponin, s/p cardiac arrest, Acute Diastolic Heart Failure  Likely has CAD vs demand ischemia- defer to Cards  temporary pacemaker in place  Repeat Echo with EF 55% with severe MV regurgitation  Dobutamine drip  IV Bumex    Substance Abuse  Heroine and Cocaine abuse  Family unsure of Tobacco or ETOH use    Hepatitis C  HIV negative  AST and T Bili elevated, May be playing a role in thrombocytopenia    Abnormal CT imaging  Cavitary lesions bilaterally, PPD -, AFB culture sent  Airborne precautions dc'd per ID  Pulm following and no indication for bronch at this time    Code status: DNR     DVT prophylaxis: SCDs due to thrombocytopenia    Care Plan discussed with: RN, Attending and patients sister at bedside   Disposition: [facility]     Hospital Problems  Date Reviewed: 5/9/2018          Codes Class Noted POA    Shock ([facility] ICD-10-CM: R57.9  ICD-9-CM: 785.50  5/9/2018 Unknown        * (Principal)Sepsis ([facility] ICD-10-CM: A41.9  ICD-9-CM: 038.9, 995.91  5/9/2018 Unknown                Review of Systems:   Review of systems not obtained due to patient factors. Vital Signs:    Last 24hrs VS reviewed since prior progress note.  Most recent are:  Visit Vitals    BP 90/72    Pulse 70    Temp 100.4 °F (38 °C)    Resp 21    Ht 6' (1.829 m)    Wt 74.9 kg (165 lb 3.2 oz)    SpO2 100%    BMI 22.41 kg/m2         Intake/Output Summary (Last 24 hours) at 05/15/18 1646  Last data filed at 05/15/18 1149   Gross per 24 hour   Intake          3498.15 ml   Output             2975 ml   Net           523.15 ml        Physical Examination:             Constitutional:  Intubated and Sedated   ENT:  ETT, MM dry. NGT in place. Right SC CVC   Resp:  Vented. Coarse BS throughout. No accessory muscle use   CV:  Regular rhythm, Sinus tachy on tele, 3/6 systolic murmur    GI:  Soft, non distended, non tender. + BS    Musculoskeletal:  + Anasarca with gross edema to all extremities. Restraints in place    Neurologic:  RASS -3      Skin:  Weeping wounds to B/L LE where skin is cracking due to anasarca. Hematologic/Lymphatic/Immunlogic:  No jaundice nor lymph node swelling  :  Normal genitalia. Data Review:    Review and/or order of clinical lab test  Review and/or order of tests in the radiology section of [facility]      Labs:     Recent Labs      05/15/18   0318  05/14/18   0501   WBC  27.5*  29.1*   HGB  11.6*  11.9*   HCT  36.3*  37.0   PLT  54*  43*     Recent Labs      05/15/18   0318  05/14/18   0501  05/13/18   0517   NA  155*  151*  149*   K  4.2  4.4  4.4   CL  120*  121*  117*   CO2  26  23  23   BUN  60*  58*  63*   CREA  1.63*  1.70*  1.73*   GLU  184*  164*  180*   CA  7.7*  7.4*  7.0*   MG  2.1  2.2  2.7*   PHOS  3.7  2.8  3.9     Recent Labs      05/15/18   0318  05/14/18   0501  05/13/18   0517   SGOT  149*  162*  143*   ALT  87*  80*  62   AP  61  65  59   TBILI  3.4*  4.0*  2.8*   TP  6.3*  6.4  5.9*   ALB  1.4*  1.5*  1.4*   GLOB  4.9*  4.9*  4.5*     Recent Labs      05/15/18   0318  05/13/18   0517   INR  1.3*  1.4*   PTP  13.5*  14.5*      No results for input(s): FE, TIBC, PSAT, FERR in the last 72 hours. No results found for: FOL, RBCF   No results for input(s): PH, PCO2, PO2 in the last 72 hours. No results for input(s): CPK, CKNDX, TROIQ in the last 72 hours.     No lab exists for component: CPKMB  Lab Results   Component Value Date/Time    Cholesterol, total <50 05/10/2018 04:32 AM    HDL Cholesterol 9 05/10/2018 04:32 AM    LDL, calculated  05/10/2018 04:32 AM     Unable to calculate LDL or VLDL due to low cholesterol.     Triglyceride 173 (H) 05/10/2018 04:32 AM    CHOL/HDL Ratio Cannot be calculated 05/10/2018 04:32 AM     Lab Results   Component Value Date/Time    Glucose (POC) 166 (H) 05/14/2018 08:41 PM    Glucose (POC) 112 (H) 05/11/2018 05:33 AM    Glucose (POC) 157 (H) 05/09/2018 12:51 PM    Glucose (POC) 96 05/09/2018 05:45 AM    Glucose (POC) 110 (H) 05/09/2018 05:03 AM     Lab Results   Component Value Date/Time    Color DARK YELLOW 05/09/2018 03:20 AM    Appearance CLOUDY (A) 05/09/2018 03:20 AM    Specific gravity 1.016 05/09/2018 03:20 AM    pH (UA) 5.5 05/09/2018 03:20 AM    Protein 30 (A) 05/09/2018 03:20 AM    Glucose NEGATIVE  05/09/2018 03:20 AM    Ketone NEGATIVE  05/09/2018 03:20 AM    Bilirubin SMALL (A) 11/25/2014 01:52 PM    Urobilinogen 1.0 05/09/2018 03:20 AM    Nitrites NEGATIVE  05/09/2018 03:20 AM    Leukocyte Esterase SMALL (A) 05/09/2018 03:20 AM    Epithelial cells FEW 05/09/2018 03:20 AM    Bacteria NEGATIVE  05/09/2018 03:20 AM    WBC 0-4 05/09/2018 03:20 AM    RBC 5-10 05/09/2018 03:20 AM         Medications Reviewed:     Current Facility-Administered Medications   Medication Dose Route Frequency    amiodarone (CORDARONE) tablet 400 mg  400 mg Per NG tube Q8H    DOBUTamine (DOBUTREX) 500 MG/250 mL (2000 mcg/mL) in D5W infusion  0-10 mcg/kg/min IntraVENous TITRATE    balsam peru-castor oil (VENELEX)  mg/gram ointment   Topical Q8H    acetaminophen (TYLENOL) solution 650 mg  650 mg Per NG tube Q4H PRN    vancomycin (VANCOCIN) 1250 mg in  ml infusion  1,250 mg IntraVENous Q18H    bumetanide (BUMEX) injection 1 mg  1 mg IntraVENous BID    propofol (DIPRIVAN) infusion  0-30 mcg/kg/min IntraVENous TITRATE    fentaNYL (PF) 900 mcg/30 ml infusion soln  0-200 mcg/hr IntraVENous TITRATE    albuterol-ipratropium (DUO-NEB) 2.5 MG-0.5 MG/3 ML  3 mL Nebulization Q6H RT    chlorhexidine (PERIDEX) 0.12 % mouthwash 15 mL  15 mL Oral BID    pantoprazole (PROTONIX) 40 mg in sodium chloride 0.9% 10 mL injection  40 mg IntraVENous DAILY    PHENYLephrine (PF)(PATSY-SYNEPHRINE) 30 mg in 0.9% sodium chloride 250 mL infusion   mcg/min IntraVENous TITRATE    acetaminophen (TYLENOL) suppository 650 mg  650 mg Rectal Q6H PRN    sodium chloride (NS) flush 5-10 mL  5-10 mL IntraVENous Q8H    sodium chloride (NS) flush 5-10 mL  5-10 mL IntraVENous PRN    sodium chloride (NS) flush 5-10 mL  5-10 mL IntraVENous PRN    piperacillin-tazobactam (ZOSYN) 3.375 g in 0.9% sodium chloride (MBP/ADV) 100 mL  3.375 g IntraVENous Q8H    LORazepam (ATIVAN) injection 2 mg  2 mg IntraVENous Q4H PRN    Vancomycin Pharmacy Dosing   Other PRN    sodium chloride (NS) flush 20 mL  20 mL InterCATHeter PRN    sodium chloride (NS) flush 10 mL  10 mL InterCATHeter Q24H    sodium chloride (NS) flush 10 mL  10 mL InterCATHeter PRN    sodium chloride (NS) flush 10 mL  10 mL InterCATHeter Q8H    alteplase (CATHFLO) 1 mg in sterile water (preservative free) 1 mL injection  1 mg InterCATHeter PRN     ______________________________________________________________________  EXPECTED LENGTH OF STAY: 12d 12h  ACTUAL LENGTH OF STAY:          [address], NP

## 2018-05-15 NOTE — PROGRESS NOTES
Pharmacist Note - Vancomycin Dosing  Therapy day 7  Indication: Sepsis secondary to endocarditis   Current regimen: 1250 mg IV Q 18 hr    A Trough Level resulted at 14.7 mcg/mL which was obtained 17 hrs post-dose. The extrapolated \"true\" trough is [id number] mcg/mL based on the patient's known kinetics. Goal trough: 15 - 20 mcg/mL     Plan: Continue current regimen. Pharmacy will continue to monitor this patient daily for changes in clinical status and renal function.

## 2018-05-15 NOTE — PROGRESS NOTES
ID Progress Note  5/15/2018     vancharis Jacobon     Subjective:     Having fevers. Remains on the vent. Objective:     Vitals:   Visit Vitals    BP 90/72    Pulse 70    Temp 100.4 °F (38 °C)    Resp 21    Ht 6' (1.829 m)    Wt 74.9 kg (165 lb 3.2 oz)  Comment: new bed    SpO2 100%    BMI 22.41 kg/m2        Tmax:  Temp (24hrs), Av.3 °F (37.9 °C), Min:99.1 °F (37.3 °C), Max:102.4 °F (39.1 °C)      Exam:    On the vent  Lungs: clear to auscultation anteriorly, no rales, wheezes   Heart: (+) systolic murmur   Abdomen: soft, nontender, no guarding or rebound           Labs:   Lab Results   Component Value Date/Time    WBC 27.5 (H) 05/15/2018 03:18 AM    HGB 11.6 (L) 05/15/2018 03:18 AM    Hematocrit (POC) 35 (L) 2018 05:03 AM    HCT 36.3 (L) 05/15/2018 03:18 AM    PLATELET 54 (L)  03:18 AM    MCV 86.0 05/15/2018 03:18 AM     Lab Results   Component Value Date/Time    Sodium 155 (H) 05/15/2018 03:18 AM    Potassium 4.2 05/15/2018 03:18 AM    Chloride 120 (H) 05/15/2018 03:18 AM    CO2 26 05/15/2018 03:18 AM    Anion gap 9 05/15/2018 03:18 AM    Glucose 184 (H) 05/15/2018 03:18 AM    BUN 60 (H) 05/15/2018 03:18 AM    Creatinine 1.63 (H) 05/15/2018 03:18 AM    BUN/Creatinine ratio 37 (H) 05/15/2018 03:18 AM    GFR est AA 53 (L) 05/15/2018 03:18 AM    GFR est non-AA 44 (L) 05/15/2018 03:18 AM    Calcium 7.7 (L) 05/15/2018 03:18 AM    Bilirubin, total 3.4 (H) 05/15/2018 03:18 AM    AST (SGOT) 149 (H) 05/15/2018 03:18 AM    Alk. phosphatase 61 05/15/2018 03:18 AM    Protein, total 6.3 (L) 05/15/2018 03:18 AM    Albumin 1.4 (L) 05/15/2018 03:18 AM    Globulin 4.9 (H) 05/15/2018 03:18 AM    A-G Ratio 0.3 (L) 05/15/2018 03:18 AM    ALT (SGPT) 87 (H) 05/15/2018 03:18 AM     cxr  personally reviewed - completely opacified left hemithorax       2 out of 2 MRSA  5/10 2 out of 4 MRSA   1 out of 2 MRSA   2 out of 4 mrsa      Assessment:       1.   Mitral valve and tricuspid valve endocarditis with MRSA    2. Multiple pulmonary cavitary lesions, likely from septic emboli. 3.  Severe mitral regurgitation. 4.  Renal insufficiency. 5.  Thrombocytopenia. 6.  Hyperbilirubinemia. 7.  Substance abuse due to cocaine and heroin. 8. Left subarachnoid hemorrhage     9. S/p transvenous pacer placement on 5/11         Recommendations:       1.  PLans for withdrawal tomorrow. He can be kept on abx until tomorrow. I will sign off. If family changes their mind, I can see him again.        Erika Gutierrez MD

## 2018-05-15 NOTE — PROGRESS NOTES
05/15/2018; 10:15 -   CM participated in IDRs on patient. Patient has an updated DNR status as of 5/14/18. Patient's NG tube was removed 5/15/18, and fevers continue. Family in agreement to compassionate extubation on 5/16/18 with palliative lead. CM to continue following for any ongoing CM needs.   CRM: Landon Thompson, MPH; Z: [phone]

## 2018-05-15 NOTE — PROGRESS NOTES
Bedside and Verbal shift change report given to Isela Anne by [facility]. Report included the following information SBAR, Kardex, MAR, Recent Results and Cardiac Rhythm SR/Paced. 0930: Visitors at bedside.  Updated Dr Adriano Rowan on pt's change in code status and family's decision to withdraw tomorrow

## 2018-05-15 NOTE — PROGRESS NOTES
Problem: Pressure Injury - Risk of  Goal: *Prevention of pressure injury  Document Darwin Scale and appropriate interventions in the flowsheet. Offload heels  Turn approximately every 2 hours   Outcome: Progressing Towards Goal  Pressure Injury Interventions:  Sensory Interventions: Assess changes in LOC, Check visual cues for pain, Float heels, Pressure redistribution bed/mattress (bed type)    Moisture Interventions: Absorbent underpads, Check for incontinence Q2 hours and as needed, Maintain skin hydration (lotion/cream)    Activity Interventions: Assess need for specialty bed, Pressure redistribution bed/mattress(bed type)    Mobility Interventions: Assess need for specialty bed, Float heels, HOB 30 degrees or less, Pressure redistribution bed/mattress (bed type)    Nutrition Interventions: Document food/fluid/supplement intake    Friction and Shear Interventions: Foam dressings/transparent film/skin sealants, HOB 30 degrees or less, Lift sheet               Problem: Falls - Risk of  Goal: *Absence of Falls  Document Jer Fall Risk and appropriate interventions in the flowsheet. Outcome: Progressing Towards Goal  Fall Risk Interventions:  Mobility Interventions: Communicate number of staff needed for ambulation/transfer, Utilize gait belt for transfers/ambulation    Mentation Interventions: Adequate sleep, hydration, pain control, Evaluate medications/consider consulting pharmacy, More frequent rounding    Medication Interventions: Evaluate medications/consider consulting pharmacy, Teach patient to arise slowly    Elimination Interventions:  Toileting schedule/hourly rounds

## 2018-05-15 NOTE — PROGRESS NOTES
NAME: Taylor Thomas        :  1960        MRN:  [id number]        Assessment :    Plan:  --TERRELL- ATN due to sepsis/arrest  Hypernatremia  Sepsis  Cavitary lung lesions  IVDA  AMS  Endocarditis/severe MR and TR  S/P lobo arrest 5/10- s/p TVP on  -Cr marginally better again today-> 1.6mg/dl  Na up to 155->  FW via NGT   Ct ABx  Ct IV Bumex  Pressors    On vent. Critically ill. Poor prognosis  Palliative care on board  Family plans withdrawing care tomorrow    Will sign off. Subjective:     Chief Complaint:  Febrile. On vent; sedated. On vasopressor support    Review of Systems:     Could not obtain due to: On vent     Objective:     VITALS:   Last 24hrs VS reviewed since prior progress note. Most recent are:  Visit Vitals    /66    Pulse 69    Temp 100.4 °F (38 °C)    Resp 19    Ht 6' (1.829 m)    Wt 74.9 kg (165 lb 3.2 oz)    SpO2 100%    BMI 22.41 kg/m2       Intake/Output Summary (Last 24 hours) at 05/15/18 1315  Last data filed at 05/15/18 1149   Gross per 24 hour   Intake          4045.35 ml   Output             3975 ml   Net            70.35 ml      Telemetry Reviewed:     PHYSICAL EXAM:  General: Intubated. On vent.   Dec BS at bases  RRR, hyperdynamic  1+ edema  Venous stasis changes+  Sedated on vent      Lab Data Reviewed: (see below)    Medications Reviewed: (see below)    PMH/SH reviewed - no change compared to H&P  ________________________________________________________________________  Care Plan discussed with:  Patient     Family      RN y    Care Manager                    Consultant:          Comments   >50% of visit spent in counseling and coordination of care       ________________________________________________________________________  Selina Billingsley MD     Procedures: see electronic medical records for all procedures/Xrays and details which  were not copied into this note but were reviewed prior to creation of Plan. LABS:  Recent Labs      05/15/18   0318  05/14/18   0501   WBC  27.5*  29.1*   HGB  11.6*  11.9*   HCT  36.3*  37.0   PLT  54*  43*     Recent Labs      05/15/18   0318  05/14/18   0501  05/13/18   0517   NA  155*  151*  149*   K  4.2  4.4  4.4   CL  120*  121*  117*   CO2  26  23  23   BUN  60*  58*  63*   CREA  1.63*  1.70*  1.73*   GLU  184*  164*  180*   CA  7.7*  7.4*  7.0*   MG  2.1  2.2  2.7*   PHOS  3.7  2.8  3.9     Recent Labs      05/15/18   0318  05/14/18   0501  05/13/18   0517   SGOT  149*  162*  143*   AP  61  65  59   TP  6.3*  6.4  5.9*   ALB  1.4*  1.5*  1.4*   GLOB  4.9*  4.9*  4.5*     Recent Labs      05/15/18   0318  05/13/18   0517   INR  1.3*  1.4*   PTP  13.5*  14.5*      No results for input(s): FE, TIBC, PSAT, FERR in the last 72 hours. No results found for: FOL, RBCF   No results for input(s): PH, PCO2, PO2 in the last 72 hours. No results for input(s): CPK, CKMB in the last 72 hours.     No lab exists for component: TROPONINI  No components found for: [place]  Lab Results   Component Value Date/Time    Color DARK YELLOW 05/09/2018 03:20 AM    Appearance CLOUDY (A) 05/09/2018 03:20 AM    Specific gravity 1.016 05/09/2018 03:20 AM    pH (UA) 5.5 05/09/2018 03:20 AM    Protein 30 (A) 05/09/2018 03:20 AM    Glucose NEGATIVE  05/09/2018 03:20 AM    Ketone NEGATIVE  05/09/2018 03:20 AM    Bilirubin SMALL (A) 11/25/2014 01:52 PM    Urobilinogen 1.0 05/09/2018 03:20 AM    Nitrites NEGATIVE  05/09/2018 03:20 AM    Leukocyte Esterase SMALL (A) 05/09/2018 03:20 AM    Epithelial cells FEW 05/09/2018 03:20 AM    Bacteria NEGATIVE  05/09/2018 03:20 AM    WBC 0-4 05/09/2018 03:20 AM    RBC 5-10 05/09/2018 03:20 AM       MEDICATIONS:  Current Facility-Administered Medications   Medication Dose Route Frequency    amiodarone (CORDARONE) tablet 400 mg  400 mg Per NG tube Q8H    DOBUTamine (DOBUTREX) 500 MG/250 mL (2000 mcg/mL) in D5W infusion  0-10 mcg/kg/min IntraVENous TITRATE    balsam peru-castor oil (VENELEX)  mg/gram ointment   Topical Q8H    acetaminophen (TYLENOL) solution 650 mg  650 mg Per NG tube Q4H PRN    vancomycin (VANCOCIN) 1250 mg in  ml infusion  1,250 mg IntraVENous Q18H    bumetanide (BUMEX) injection 1 mg  1 mg IntraVENous BID    propofol (DIPRIVAN) infusion  0-30 mcg/kg/min IntraVENous TITRATE    fentaNYL (PF) 900 mcg/30 ml infusion soln  0-200 mcg/hr IntraVENous TITRATE    albuterol-ipratropium (DUO-NEB) 2.5 MG-0.5 MG/3 ML  3 mL Nebulization Q6H RT    chlorhexidine (PERIDEX) 0.12 % mouthwash 15 mL  15 mL Oral BID    pantoprazole (PROTONIX) 40 mg in sodium chloride 0.9% 10 mL injection  40 mg IntraVENous DAILY    PHENYLephrine (PF)(PATSY-SYNEPHRINE) 30 mg in 0.9% sodium chloride 250 mL infusion   mcg/min IntraVENous TITRATE    acetaminophen (TYLENOL) suppository 650 mg  650 mg Rectal Q6H PRN    sodium chloride (NS) flush 5-10 mL  5-10 mL IntraVENous Q8H    sodium chloride (NS) flush 5-10 mL  5-10 mL IntraVENous PRN    sodium chloride (NS) flush 5-10 mL  5-10 mL IntraVENous PRN    piperacillin-tazobactam (ZOSYN) 3.375 g in 0.9% sodium chloride (MBP/ADV) 100 mL  3.375 g IntraVENous Q8H    LORazepam (ATIVAN) injection 2 mg  2 mg IntraVENous Q4H PRN    Vancomycin Pharmacy Dosing   Other PRN    sodium chloride (NS) flush 20 mL  20 mL InterCATHeter PRN    sodium chloride (NS) flush 10 mL  10 mL InterCATHeter Q24H    sodium chloride (NS) flush 10 mL  10 mL InterCATHeter PRN    sodium chloride (NS) flush 10 mL  10 mL InterCATHeter Q8H    alteplase (CATHFLO) 1 mg in sterile water (preservative free) 1 mL injection  1 mg InterCATHeter PRN

## 2018-05-15 NOTE — CONSULTS
Palliative Medicine Consult  Dontae: [phone] ([phone])    Patient Name: Pankaj Luke  YOB: 1960    Date of Initial Consult: 5/11/18  Reason for Consult: Care Decisions   Requesting Provider: Matilde Stephens MD  Primary Care Physician: None     SUMMARY:   Pankaj Luke is a 62 y.o.  [place] male  with a past history of hep c, I/V drug abuse, htn ,  who was admitted on 5/9/2018 from home  with a diagnosis of unresponsiveness and sepsis , uds positive for cocaine. 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 5/14/18:  Ct of head :New left subarachnoid hemorrhage with persistent area of hypodensity in the right occipital lobe compatible with additional small subarachnoid hemorrhage from cardioembolic process. hemorrhage. Psychosocail: , wife is incarcerated,  No children ,have eight siblings/ legal next of kin . Patient has long standing h/o drug abuse. Tucker Ramos (sister ) # [phone]. Leta Kwan # [phone]  [facility]ambo Corporal # [phone]  [facility] # [phone]    [facility] # [phone]    [facility] [place] : [phone]  [facility] : [phone]     PALLIATIVE DIAGNOSES:   1. Goals of Care counseling and discussion   2. Encephalopathy  3. Sepsis with MRSA Mitral and tricuspid  valve endocarditis(7. Long h/o I/v drug abuse)  4. Subarachnoid hemorrhage from cardioembolic process( 9/26/92)  5. Resp failure/ vent dependant /Multi organ failure  6. Debility  7. John cardiac arrest , s/p temporary pacemaker . PLAN:    Spoke to patient wife Ford/ surrogate from [facility],  conference call  with patient brother [address] [facility] . 1.  Updated on his current medical issues and very high risk of death . 2. Hong Billy is  to Mr Zachary Solorzano x 33 years. 3. Ford defers decisions to patient siblings . 4. She agrees for DNAR and compassionate extubation. 5. Afterwards I spoke to patient brother Barbara [place] Mr Chan James for family meeting tomorrow for compassionate extubation , Chan James would coordinate with his sibling and will call me for confirmation for a meeting either tomorrow or Thursday . Initial consult note routed to primary continuity provider  Communicated plan of care with: Palliative IDT, Bed side Rn Landon. GOALS OF CARE / TREATMENT PREFERENCES:     GOALS OF CARE:     Family is considering compassionate extubation and comfort care. Waiting to hear from family for time for family meeting . TREATMENT PREFERENCES:   Code Status: DNAR    Advance Care Planning:  No flowsheet data found. Other Instructions: Other:    As far as possible, the palliative care team has discussed with patient / health care proxy about goals of care / treatment preferences for patient. HISTORY:     History obtained from: chart , bed side RN and his brother Christopher Yanes. CHIEF COMPLAINT: admitted for above. HPI/SUBJECTIVE:    The patient is:     [] Non-participatory due to: obtunded. Sedated , intubated, on presors, s/p cardiac arrest today. 5/14/18: Patient is intubated , sedated, vent dependant.       Clinical Pain Assessment (nonverbal scale for severity on nonverbal patients):   Clinical Pain Assessment  Severity: 0     Activity (Movement): Lying quietly, normal position    Duration: for how long has pt been experiencing pain (e.g., 2 days, 1 month, years)  Frequency: how often pain is an issue (e.g., several times per day, once every few days, constant)     FUNCTIONAL ASSESSMENT:     Palliative Performance Scale (PPS):  PPS: 30       PSYCHOSOCIAL/SPIRITUAL SCREENING:     Palliative IDT has assessed this patient for cultural preferences / practices and a referral made as appropriate to needs (Cultural Services, Patient Advocacy, Ethics, etc.)    Advance Care Planning:  No flowsheet data found. Any spiritual / Church concerns:  [] Yes /  [] No    Caregiver Burnout:  [] Yes /  [x] No /  [] No Caregiver Present      Anticipatory grief assessment:   [] Normal  / [] Maladaptive       ESAS Anxiety:      ESAS Depression:     Unable to evaluate above because of patient condition . REVIEW OF SYSTEMS:     Positive and pertinent negative findings in ROS are noted above in HPI. The following systems were [] reviewed / [x] unable to be reviewed as noted in HPI  Other findings are noted below. Systems: constitutional, ears/nose/mouth/throat, respiratory, gastrointestinal, genitourinary, musculoskeletal, integumentary, neurologic, psychiatric, endocrine. Positive findings noted below. Modified ESAS Completed by: provider           Pain: 0     Nausea: 0     Dyspnea: 0           Stool Occurrence(s): 1        PHYSICAL EXAM:     From RN flowsheet:  Wt Readings from Last 3 Encounters:   05/15/18 165 lb 3.2 oz (74.9 kg)   05/09/18 178 lb 9.2 oz (81 kg)   11/25/14 198 lb (89.8 kg)     Blood pressure 112/66, pulse 69, temperature 100.4 °F (38 °C), resp. rate 19, height 6' (1.829 m), weight 165 lb 3.2 oz (74.9 kg), SpO2 100 %. Pain Scale 1: Adult Nonverbal Pain Scale  Pain Intensity 1: 0                 Last bowel movement, if known:     Constitutional: chronically sick, looks older to stated age, intubated , sedated , soft restraints. Eyes: pupils equal, anicteric  ENMT: dry lips.   Cardiovascular: regular rhythm, no edema   Respiratory: breathing not labored, symmetric  Gastrointestinal: soft non-tender, +bowel sounds  Skin: dry  Neurologic: intubated, sedated  Psychiatric: unable to evaluate  Other:       HISTORY:     Principal Problem:    Sepsis ([address] (5/9/2018)    Active Problems:    Shock ([address] (5/9/2018)      Past Medical History: Diagnosis Date    Hypertension     Infectious disease     Hepatitis C      Past Surgical History:   Procedure Laterality Date    HX ORTHOPAEDIC      back injury 2013      History reviewed. No pertinent family history. History reviewed, no pertinent family history.   Social History   Substance Use Topics    Smoking status: Current Every Day Smoker    Smokeless tobacco: Never Used    Alcohol use Yes      Comment: occasional     No Known Allergies   Current Facility-Administered Medications   Medication Dose Route Frequency    amiodarone (CORDARONE) tablet 400 mg  400 mg Per NG tube Q8H    DOBUTamine (DOBUTREX) 500 MG/250 mL (2000 mcg/mL) in D5W infusion  0-10 mcg/kg/min IntraVENous TITRATE    balsam peru-castor oil (VENELEX)  mg/gram ointment   Topical Q8H    acetaminophen (TYLENOL) solution 650 mg  650 mg Per NG tube Q4H PRN    vancomycin (VANCOCIN) 1250 mg in  ml infusion  1,250 mg IntraVENous Q18H    bumetanide (BUMEX) injection 1 mg  1 mg IntraVENous BID    propofol (DIPRIVAN) infusion  0-30 mcg/kg/min IntraVENous TITRATE    fentaNYL (PF) 900 mcg/30 ml infusion soln  0-200 mcg/hr IntraVENous TITRATE    albuterol-ipratropium (DUO-NEB) 2.5 MG-0.5 MG/3 ML  3 mL Nebulization Q6H RT    chlorhexidine (PERIDEX) 0.12 % mouthwash 15 mL  15 mL Oral BID    pantoprazole (PROTONIX) 40 mg in sodium chloride 0.9% 10 mL injection  40 mg IntraVENous DAILY    PHENYLephrine (PF)(PATSY-SYNEPHRINE) 30 mg in 0.9% sodium chloride 250 mL infusion   mcg/min IntraVENous TITRATE    acetaminophen (TYLENOL) suppository 650 mg  650 mg Rectal Q6H PRN    sodium chloride (NS) flush 5-10 mL  5-10 mL IntraVENous Q8H    sodium chloride (NS) flush 5-10 mL  5-10 mL IntraVENous PRN    sodium chloride (NS) flush 5-10 mL  5-10 mL IntraVENous PRN    piperacillin-tazobactam (ZOSYN) 3.375 g in 0.9% sodium chloride (MBP/ADV) 100 mL  3.375 g IntraVENous Q8H    LORazepam (ATIVAN) injection 2 mg  2 mg IntraVENous Q4H PRN    Vancomycin Pharmacy Dosing   Other PRN    sodium chloride (NS) flush 20 mL  20 mL InterCATHeter PRN    sodium chloride (NS) flush 10 mL  10 mL InterCATHeter Q24H    sodium chloride (NS) flush 10 mL  10 mL InterCATHeter PRN    sodium chloride (NS) flush 10 mL  10 mL InterCATHeter Q8H    alteplase (CATHFLO) 1 mg in sterile water (preservative free) 1 mL injection  1 mg InterCATHeter PRN          LAB AND IMAGING FINDINGS:     Lab Results   Component Value Date/Time    WBC 27.5 (H) 05/15/2018 03:18 AM    HGB 11.6 (L) 05/15/2018 03:18 AM    PLATELET 54 (L) 81/92/4674 03:18 AM     Lab Results   Component Value Date/Time    Sodium 155 (H) 05/15/2018 03:18 AM    Potassium 4.2 05/15/2018 03:18 AM    Chloride 120 (H) 05/15/2018 03:18 AM    CO2 26 05/15/2018 03:18 AM    BUN 60 (H) 05/15/2018 03:18 AM    Creatinine 1.63 (H) 05/15/2018 03:18 AM    Calcium 7.7 (L) 05/15/2018 03:18 AM    Magnesium 2.1 05/15/2018 03:18 AM    Phosphorus 3.7 05/15/2018 03:18 AM      Lab Results   Component Value Date/Time    AST (SGOT) 149 (H) 05/15/2018 03:18 AM    Alk. phosphatase 61 05/15/2018 03:18 AM    Protein, total 6.3 (L) 05/15/2018 03:18 AM    Albumin 1.4 (L) 05/15/2018 03:18 AM    Globulin 4.9 (H) 05/15/2018 03:18 AM     Lab Results   Component Value Date/Time    INR 1.3 (H) 05/15/2018 03:18 AM    Prothrombin time 13.5 (H) 05/15/2018 03:18 AM    aPTT 30.7 05/10/2018 09:37 PM      No results found for: IRON, FE, TIBC, IBCT, PSAT, FERR   No results found for: PH, PCO2, PO2  No components found for: [place]   Lab Results   Component Value Date/Time     05/10/2018 06:05 AM    CK - MB 31.6 (H) 05/10/2018 06:05 AM                Total time:   Counseling / coordination time, spent as noted above:   > 50% counseling / coordination?:     Prolonged service was provided for  []30 min   []75 min in face to face time in the presence of the patient, spent as noted above.   Time Start:   Time End:   Note: this can only be billed with 30365 (initial) or 16402 (follow up). If multiple start / stop times, list each separately.

## 2018-05-16 NOTE — PROGRESS NOTES
[address] May 16, 2018       RE: Colleen Prieto      To Whom It May Concern,    This is to certify that Colleen Prieto has been a patient in the hospital for 7 days. There was a withdrawal of care on 5/16/2018 at 5:30PM.    Please feel free to contact my office if you have any questions or concerns. Thank you for your assistance in this matter.       Sincerely,  Aparna Forbes NP  Lead Nurse Practitioner  [facility]  [phone]

## 2018-05-16 NOTE — PROGRESS NOTES
2000  Bedside shift change report given to Jourdan Wilson (oncoming nurse) by Umang Montes (offgoing nurse). Report included the following information SBAR, Kardex, Intake/Output, MAR, Recent Results and Cardiac Rhythm Paced. Complete assessment done. 2100  Complete CHG bath done, linens changed. Mouth care done with ETT sxn for tan secretions. Pt repositioned x2 assist.    2130  Visitors at bedside. Updated on pt status questions answered and support offered. 2309  José gtt increased to 100mcg forSBP 82-85 /56-61    0000  Pt improved  SBP . No other changes in assessment. 0144  Tylenol 650 mg given per OGT for temp 38.8. Cont to monitor. 0200  Pt repositioned x2 assist.  Mouth care done. 0400  No changes in assessment. Sched meds given. 0730  Bedside shift change report given to Steven Deutsch (oncoming nurse) by Jourdan Wilson (offgoing nurse). Report included the following information SBAR, Kardex, Intake/Output, MAR, Recent Results and Cardiac Rhythm paced.

## 2018-05-16 NOTE — PROGRESS NOTES
Spiritual Care Assessment/Progress Note  [facility]      NAME: Jose Enrique He      MRN: [id number]  AGE: 62 y.o. SEX: male  Yazdanism Affiliation: Latter day   Language: English     5/16/2018     Total Time (in minutes): 39     Spiritual Assessment begun in [address] [place] [address] CORONARY CARE through conversation with:         []Patient        [x] Family    [] Friend(s)        Reason for Consult: Palliative Care, Family Conference     Spiritual beliefs: (Please include comment if needed)     [x] Identifies with a walter tradition:     [] Supported by a walter community:      [] Claims no spiritual orientation:      [] Seeking spiritual identity:           [] Adheres to an individual form of spirituality:      [] Not able to assess:                     Identified resources for coping:      [x] Prayer                               [] Music                  [] Guided Imagery     [x] Family/friends                 [] Pet visits     [] Devotional reading                         [] Unknown     [] Other:                                               Interventions offered during this visit: (See comments for more details)    Patient Interventions: Initial visit     Family/Friend(s):  Affirmation of walter, Affirmation of emotions/emotional suffering, Catharsis/review of pertinent events in supportive environment, Coping skills reviewed/reinforced, Life review/legacy, Initial Assessment, Iconic (affirming the presence of God/Higher Power), Normalization of emotional/spiritual concerns, Prayer (assurance of)     Plan of Care:     [] Support spiritual and/or cultural needs    [] Support AMD and/or advance care planning process      [x] Support grieving process   [] Coordinate Rites and/or Rituals    [] Coordination with community clergy   [] No spiritual needs identified at this time   [] Detailed Plan of Care below (See Comments)  [] Make referral to Music Therapy  [] Make referral to Pet Therapy     [] Make referral to Addiction services  [] Make referral to [facility]  [] Make referral to Spiritual Care Partner  [] No future visits requested        [x] Follow up visits as needed     Comments: Responded to request to provide support during family meeting as compassionate extubation was discussed. Large number of family members are gathered at the bedside sharing stories and memories and supporting one another. Introduced self and remained present during family meeting as decisions were made and family prepared for patient's transition to comfort measures. Connected with several of patient's siblings providing empathetic listening and facilitating processing of emotions as family shared that they have already had 2 siblings die and have coped with that through their walter in God and commitment to one another. Patient's oldest brother expressed grief and guilt that he was not able to help the patient with his self-destruction behaviors or convince him to seek medical treatment sooner. Overall family is coping well, supporting one another and expressed no immediate needs from [facility] at this time. Advised of  availability and assured of ongoing support as needed and desired.  KADIE Hinds.Div.    Paging Service [phone])

## 2018-05-16 NOTE — ACP (ADVANCE CARE PLANNING)
Patient wife Low Sosa , defers medical decisions to patient siblings, his brother Dwain Duverney is the contact point . Family decided for comfort care. Patient is DNAR.

## 2018-05-16 NOTE — PROGRESS NOTES
Patient discussed in [facility] rounds today. Family has met with Palliative care and there is another meeting at 5 pm today with family for compassionate extubation. Patient has 8 siblings. Wife in incarcerated -- not known if she will be present.

## 2018-05-16 NOTE — PROGRESS NOTES
1555- Bedside report received from [address] [place] [address],[address] Floor RN, assumed care of pt.     1945- Bedside and Verbal shift change report given to Fadumo Chew (oncoming nurse) by Tonya Galeas and Freeman Espinal  (offgoing nurse). Report included the following information SBAR, Kardex, MAR and Med Rec Status. Refer to Freeman Espinal RN's note for withdrawal details.

## 2018-05-16 NOTE — PROGRESS NOTES
Problem: Pressure Injury - Risk of  Goal: *Prevention of pressure injury  Document Darwin Scale and appropriate interventions in the flowsheet.      Offload heels  Turn approximately every 2 hours   Outcome: Progressing Towards Goal  Pressure Injury Interventions:  Sensory Interventions: Monitor skin under medical devices    Moisture Interventions: Apply protective barrier, creams and emollients    Activity Interventions: Pressure redistribution bed/mattress(bed type)    Mobility Interventions: Pressure redistribution bed/mattress (bed type)    Nutrition Interventions: Document food/fluid/supplement intake    Friction and Shear Interventions: Apply protective barrier, creams and emollients

## 2018-05-16 NOTE — INTERDISCIPLINARY ROUNDS
IDR/SLIDR Summary          Patient: Tamela Marin MRN: [id number]    Age: 62 y.o. YOB: 1960 Room/Bed: [address] [place] [address]   Admit Diagnosis: Sepsis ([facility] [address]  Shock ([facility] [address] [facility]  Principal Diagnosis: Sepsis ([facility] [address]   Goals: Afebrile,stable BP & HR  Readmission: NO  Quality Measure: SEPSIS  VTE Prophylaxis: Mechanical  Influenza Vaccine screening completed? NO  Pneumococcal Vaccine screening completed? NO  Mobility needs: No   Nutrition plan:Yes  Consults:P.T, O.T. and Respiratory    Financial concerns:Yes  Escalated to CM? YES  RRAT Score: 7   Interventions:H2H  Testing due for pt today?  YES  LOS: 7 days Expected length of stay TBD days  Discharge plan: TBD   PCP: None  Transportation needs: No    Days before discharge:two or more days before discharge   Discharge disposition: comfort care    Signed:     Talat Encarnacion RN  5/16/2018  1:39 AM

## 2018-05-16 NOTE — CONSULTS
Palliative Medicine Consult  Dontae: [phone] ([phone])    Patient Name: Yonis Rausch  YOB: 1960    Date of Initial Consult: 5/11/18  Reason for Consult: Care Decisions   Requesting Provider: Anthony Hinkle MD  Primary Care Physician: None     SUMMARY:   Yonis Rausch is a 62 y.o.  [place] male  with a past history of hep c, I/V drug abuse, htn ,  who was admitted on 5/9/2018 from home  with a diagnosis of unresponsiveness and sepsis , uds positive for cocaine. 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 5/14/18:  Ct of head :New left subarachnoid hemorrhage with persistent area of hypodensity in the right occipital lobe compatible with additional small subarachnoid hemorrhage from cardioembolic process. hemorrhage. Psychosocail: , wife is incarcerated,  No children ,have eight siblings/ legal next of kin . Patient has long standing h/o drug abuse. Di Seymour (sister ) # [phone]. Flores Conner # [phone]  Ni Klein # [phone]  [facility] # [phone]    Дмитрий # [phone]    Osbaldo Lowe : [phone]  Maggie Cortes : [phone]     PALLIATIVE DIAGNOSES:   1. Goals of Care counseling and discussion   2. End of life care s/p compassionate extubation   3. Sepsis with MRSA Mitral and tricuspid  valve endocarditis(. Long h/o I/v drug abuse)  4. Subarachnoid hemorrhage from cardioembolic process( 1/81/70)  5. Resp failure/ vent dependant /Multi organ failure  6. Debility  7. John cardiac arrest , s/p temporary pacemaker .          PLAN:    Patient wife Prince Bauer is not available( incarcerated ) I had spoken to her yesterday she has deferred decisions to patient siblings. Met with the 6/8 siblings/ next of kin : Gacarli Pimentel and Suraj Mitzy, along with hospitalist NP Graciela Aparicio. 1. Provided medical update , hypotensive despite on pressors and aggressive care,  they do not want him to artificially prolong his life . 2. They agree for compassionate extubation , transition to comfort care. 3. We talked about discontinuing drips to maintain blood pressure , for sedation, premedicate with morphine , robinul and ativan , before extubation . 4. We discussed,  he is unable to maintain his blood pressure , despite vasopressors, he may decompensate quickly and pass away. 5. I  educated them on comfort care medication available for Resp distress, pain , and other symptoms. We will consult hospice if patient stabilzed . Initial consult note routed to primary continuity provider  Communicated plan of care with: Palliative IDT, Bed side Rn L       GOALS OF CARE / TREATMENT PREFERENCES:     GOALS OF CARE:     Compassionate extubation . Comfort care. TREATMENT PREFERENCES:   Code Status: DNAR    Advance Care Planning:  Advance Care Planning 5/15/2018   Patient's Healthcare Decision Maker is: Legal Next of Gigi 69   Primary Decision Maker Name [address] [place] [address] Directive None       Other Instructions: Other:    As far as possible, the palliative care team has discussed with patient / health care proxy about goals of care / treatment preferences for patient. HISTORY:     History obtained from: chart , bed side RN and his brother Drake Pollock. CHIEF COMPLAINT: admitted for above. HPI/SUBJECTIVE:    The patient is:     [] Non-participatory due to: obtunded. Sedated , intubated, on presors, s/p cardiac arrest today. 5/14/18: Patient is intubated , sedated, vent dependant.       Clinical Pain Assessment (nonverbal scale for severity on nonverbal patients):   Clinical Pain Assessment  Severity: 0     Activity (Movement): Lying quietly, normal position    Duration: for how long has pt been experiencing pain (e.g., 2 days, 1 month, years)  Frequency: how often pain is an issue (e.g., several times per day, once every few days, constant)     FUNCTIONAL ASSESSMENT:     Palliative Performance Scale (PPS):  PPS: 30       PSYCHOSOCIAL/SPIRITUAL SCREENING:     Palliative IDT has assessed this patient for cultural preferences / practices and a referral made as appropriate to needs (Cultural Services, Patient Advocacy, Ethics, etc.)    Advance Care Planning:  Advance Care Planning 5/15/2018   Patient's Healthcare Decision Maker is: Legal Next of Gigi 69   Primary Decision Maker Name [address] [place] [address] Directive None       Any spiritual / Evangelical concerns:  [] Yes /  [] No    Caregiver Burnout:  [] Yes /  [x] No /  [] No Caregiver Present      Anticipatory grief assessment:   [] Normal  / [] Maladaptive       ESAS Anxiety:      ESAS Depression:     Unable to evaluate above because of patient condition . REVIEW OF SYSTEMS:     Positive and pertinent negative findings in ROS are noted above in HPI. The following systems were [] reviewed / [x] unable to be reviewed as noted in HPI  Other findings are noted below. Systems: constitutional, ears/nose/mouth/throat, respiratory, gastrointestinal, genitourinary, musculoskeletal, integumentary, neurologic, psychiatric, endocrine. Positive findings noted below. Modified ESAS Completed by: provider           Pain: 0     Nausea: 0     Dyspnea: 0           Stool Occurrence(s): 1        PHYSICAL EXAM:     From RN flowsheet:  Wt Readings from Last 3 Encounters:   05/16/18 163 lb 4.8 oz (74.1 kg)   05/09/18 178 lb 9.2 oz (81 kg)   11/25/14 198 lb (89.8 kg)     Blood pressure 99/59, pulse 69, temperature 98.2 °F (36.8 °C), resp. rate 17, height 6' (1.829 m), weight 163 lb 4.8 oz (74.1 kg), SpO2 100 %.     Pain Scale 1: Adult Nonverbal Pain Scale  Pain Intensity 1: 0                 Last bowel movement, if known:     Constitutional: chronically sick, looks older to stated age, intubated , sedated , soft restraints. Eyes: pupils equal, anicteric  ENMT: dry lips. Cardiovascular: regular rhythm, no edema   Respiratory: breathing not labored, symmetric  Gastrointestinal: soft non-tender, +bowel sounds  Skin: dry  Neurologic: intubated, sedated  Psychiatric: unable to evaluate  Other:       HISTORY:     Principal Problem:    Sepsis ([facility] [address] (5/9/2018)    Active Problems:    Shock ([facility] [address] (5/9/2018)      Past Medical History:   Diagnosis Date    Hypertension     Infectious disease     Hepatitis C      Past Surgical History:   Procedure Laterality Date    HX ORTHOPAEDIC      back injury 2013      History reviewed. No pertinent family history. History reviewed, no pertinent family history.   Social History   Substance Use Topics    Smoking status: Current Every Day Smoker    Smokeless tobacco: Never Used    Alcohol use Yes      Comment: occasional     No Known Allergies   Current Facility-Administered Medications   Medication Dose Route Frequency    albuterol-ipratropium (DUO-NEB) 2.5 MG-0.5 MG/3 ML  3 mL Nebulization Q6H PRN    morphine (pf) (DURAMORPH) 1 mg/mL injection 2 mg  2 mg IntraVENous NOW    morphine (pf) (DURAMORPH) 1 mg/mL injection 2 mg  2 mg IntraVENous Q1H PRN    glycopyrrolate (ROBINUL) injection 0.2 mg  0.2 mg IntraVENous ONCE    glycopyrrolate (ROBINUL) injection 0.2 mg  0.2 mg IntraVENous ONCE    LORazepam (ATIVAN) injection 2 mg  2 mg IntraVENous ONCE    LORazepam (ATIVAN) injection 2 mg  2 mg IntraVENous Q1H PRN    haloperidol lactate (HALDOL) injection 2 mg  2 mg IntraVENous Q4H PRN    bumetanide (BUMEX) injection 1 mg  1 mg IntraVENous BID    chlorhexidine (PERIDEX) 0.12 % mouthwash 15 mL  15 mL Oral BID    acetaminophen (TYLENOL) suppository 650 mg  650 mg Rectal Q6H PRN    sodium chloride (NS) flush 20 mL  20 mL InterCATHeter PRN    sodium chloride (NS) flush 10 mL  10 mL InterCATHeter PRN          LAB AND IMAGING FINDINGS:     Lab Results   Component Value Date/Time    WBC 27.5 (H) 05/15/2018 03:18 AM    HGB 11.6 (L) 05/15/2018 03:18 AM    PLATELET 54 (L) 34/55/3693 03:18 AM     Lab Results   Component Value Date/Time    Sodium 155 (H) 05/15/2018 03:18 AM    Potassium 4.2 05/15/2018 03:18 AM    Chloride 120 (H) 05/15/2018 03:18 AM    CO2 26 05/15/2018 03:18 AM    BUN 60 (H) 05/15/2018 03:18 AM    Creatinine 1.63 (H) 05/15/2018 03:18 AM    Calcium 7.7 (L) 05/15/2018 03:18 AM    Magnesium 2.1 05/15/2018 03:18 AM    Phosphorus 3.7 05/15/2018 03:18 AM      Lab Results   Component Value Date/Time    AST (SGOT) 149 (H) 05/15/2018 03:18 AM    Alk. phosphatase 61 05/15/2018 03:18 AM    Protein, total 6.3 (L) 05/15/2018 03:18 AM    Albumin 1.4 (L) 05/15/2018 03:18 AM    Globulin 4.9 (H) 05/15/2018 03:18 AM     Lab Results   Component Value Date/Time    INR 1.3 (H) 05/15/2018 03:18 AM    Prothrombin time 13.5 (H) 05/15/2018 03:18 AM    aPTT 30.7 05/10/2018 09:37 PM      No results found for: IRON, FE, TIBC, IBCT, PSAT, FERR   No results found for: PH, PCO2, PO2  No components found for: [place]   Lab Results   Component Value Date/Time     05/10/2018 06:05 AM    CK - MB 31.6 (H) 05/10/2018 06:05 AM                Total time:   Counseling / coordination time, spent as noted above:   > 50% counseling / coordination?:     Prolonged service was provided for  []30 min   []75 min in face to face time in the presence of the patient, spent as noted above. Time Start:   Time End:   Note: this can only be billed with 44287 (initial) or 68763 (follow up). If multiple start / stop times, list each separately.

## 2018-05-16 NOTE — ROUTINE PROCESS
08:00 SBAR report from Irven Siemens    08:05 During mouth care I am suctioning tube feed from his mouth. Night shift reports the same findings. Tube feeds discontinued. He requires lots of suctioning. 09:00 Suctioning ETT for the 3rd time since report.  [address] made aware of secretions, already on bumex. 10:13 Increased Phenylephrine gtt    10:35 Increased phenylephrine gtt    11:18 Increased phenylephrine gtt for low bp. Spoke with family and palliative care MD. They are to meet at 65 today and withdraw support. 12:00 Increased Phenylephrine gtt. 12:16 Increased Phenylephrine gtt    14:00 Having difficulty keeping SBP above 90 despite increasing Phenylephrine several times. Dr. Erin Brownlee aware no further orders received. Increased Phenylephrine gtt. 16:00 Bedside shift change report given to Beth Steward (oncoming nurse) by Aracely Godwin (offgoing nurse). Report included the following information SBAR, Kardex, Procedure Summary, Intake/Output, MAR, Accordion, Recent Results, Med Rec Status, Cardiac Rhythm V paced and Alarm Parameters . 17:30 Family at bedside ready to meet with Dr. Robina Kahn, they are prepared to compassionately WD support. 17:55 Medicated with Robinul, morphine and ativan in preparation for extubation per order. Family at bedside. 18:09 Extubated to NC, pressors and propofol off. Family at bedside and are supported. 19:30 Bedside shift change report given to Brayan (oncoming nurse) by Vanessa Barclay (offgoing nurse). Report included the following information SBAR, Kardex, Procedure Summary, Intake/Output, MAR, Accordion, Recent Results, Med Rec Status, Cardiac Rhythm accellerated junctional and Alarm Parameters .

## 2018-05-16 NOTE — PROGRESS NOTES
Problem: Pressure Injury - Risk of  Goal: *Prevention of pressure injury  Document Darwin Scale and appropriate interventions in the flowsheet. Offload heels  Turn approximately every 2 hours   Outcome: Progressing Towards Goal  Pressure Injury Interventions:  Sensory Interventions: Monitor skin under medical devices    Moisture Interventions: Apply protective barrier, creams and emollients    Activity Interventions: Pressure redistribution bed/mattress(bed type)    Mobility Interventions: Pressure redistribution bed/mattress (bed type)    Nutrition Interventions: Document food/fluid/supplement intake    Friction and Shear Interventions: Apply protective barrier, creams and emollients               Problem: Falls - Risk of  Goal: *Absence of Falls  Document Jer Fall Risk and appropriate interventions in the flowsheet. Outcome: Progressing Towards Goal  Fall Risk Interventions:  Mobility Interventions: Strengthening exercises (ROM-active/passive)    Mentation Interventions: Evaluate medications/consider consulting pharmacy    Medication Interventions: Evaluate medications/consider consulting pharmacy    Elimination Interventions:  Toileting schedule/hourly rounds

## 2018-05-16 NOTE — PROGRESS NOTES
Problem: Pressure Injury - Risk of  Goal: *Prevention of pressure injury  Document Darwin Scale and appropriate interventions in the flowsheet. Offload heels  Turn approximately every 2 hours   Outcome: Not Progressing Towards Goal  Pressure Injury Interventions:  Sensory Interventions: Assess need for specialty bed, Avoid rigorous massage over bony prominences, Check visual cues for pain, Minimize linen layers, Monitor skin under medical devices, Turn and reposition approx. every two hours (pillows and wedges if needed)    Moisture Interventions: Apply protective barrier, creams and emollients, Moisture barrier    Activity Interventions: Pressure redistribution bed/mattress(bed type)    Mobility Interventions: Float heels, Assess need for specialty bed, Pressure redistribution bed/mattress (bed type)    Nutrition Interventions: Document food/fluid/supplement intake    Friction and Shear Interventions: Apply protective barrier, creams and emollients, Minimize layers, Transfer aides:transfer board/Khadijah lift/ceiling lift, Transferring/repositioning devices               Problem: Falls - Risk of  Goal: *Absence of Falls  Document Jer Fall Risk and appropriate interventions in the flowsheet. Outcome: Progressing Towards Goal  Fall Risk Interventions:  Mobility Interventions: Strengthening exercises (ROM-active/passive)    Mentation Interventions: More frequent rounding    Medication Interventions: Evaluate medications/consider consulting pharmacy    Elimination Interventions: Toileting schedule/hourly rounds             Problem: Nutrition Deficit  Goal: *Optimize nutritional status  Outcome: Not Progressing Towards Goal  Tube feeds of for possible aspiration.  He has mouth full of ;tube feeds but residual is negative

## 2018-05-17 NOTE — HOSPICE
[facility] Liaison note:    Visited with patient, patient is inpatient appropriate. We have reached out to family and a meeting will be arranged tomorrow with mala Jacob. He is reaching out to his siblings as we will need majority consent since his wife is incarcerated. He will call in the am with a meeting time.       Thank you,    DILSHAD ThakurDecatur Morgan Hospital

## 2018-05-17 NOTE — CONSULTS
Palliative Medicine Consult  Dontae: [phone] ([phone])    Patient Name: Rajesh Copeland  YOB: 1960    Date of Initial Consult: 5/11/18  Reason for Consult: Care Decisions   Requesting Provider: Phil Joshi MD  Primary Care Physician: None     SUMMARY:   Rajesh Copeland is a 62 y.o.  [place] male  with a past history of hep c, I/V drug abuse, htn ,  who was admitted on 5/9/2018 from home  with a diagnosis of unresponsiveness and sepsis , uds positive for cocaine. 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 5/14/18:  Ct of head :New left subarachnoid hemorrhage with persistent area of hypodensity in the right occipital lobe compatible with additional small subarachnoid hemorrhage from cardioembolic process. hemorrhage. Psychosocail: , wife is incarcerated,  No children ,have eight siblings/ legal next of kin . Patient has long standing h/o drug abuse. Leisa Epperson (sister ) # [phone]. Kinsey Keyes # [phone]  Olive Garner # [phone]  [facility] # [phone]    Дмитрий # [phone]    [facility] : [phone]  [facility] : [phone]     PALLIATIVE DIAGNOSES:   1. End of life care. 2. Prognosis of hours -days. 3. Sepsis with MRSA Mitral and tricuspid  valve endocarditis(. Long h/o I/v drug abuse)  4. Subarachnoid hemorrhage from cardioembolic process( 3/34/66)  5. Resp failure/ vent dependant /Multi organ failure s/p compassionate extubation 5/16/18. 6. Debility  7. John cardiac arrest , s/p temporary pacemaker . PLAN:     Patient is s/p compassionate extubation, yesterday , currently tachypnic not in any distress.     Spoke to his brother Neyda Vann /contact point , discussed hospice . 1. Hospice consult placed . 2. Agree with fentanyl PCA and continuous , nursing staff to push PCA for pain or distress,.    3.  Currently No medication adjustment recommended. Initial consult note routed to primary continuity provider  Communicated plan of care with: Palliative IDT, Bed side David Stevens, and primary attending Dr Leana Beebe. GOALS OF CARE / TREATMENT PREFERENCES:     GOALS OF CARE:     Compassionate extubation . Comfort care. TREATMENT PREFERENCES:   Code Status: DNAR    Advance Care Planning:  Advance Care Planning 5/15/2018   Patient's Healthcare Decision Maker is: Legal Next of Gigi 69   Primary Decision Maker Name [address] Directive None       Other Instructions: Other:    As far as possible, the palliative care team has discussed with patient / health care proxy about goals of care / treatment preferences for patient. HISTORY:     History obtained from: chart , bed side RN and his brother Alcon Cano. CHIEF COMPLAINT: admitted for above. HPI/SUBJECTIVE:    The patient is:     [] Non-participatory due to: obtunded. Sedated , intubated, on presors, s/p cardiac arrest today. 5/14/18: Patient is intubated , sedated, vent dependant. 5/17/18: patient is not in any distress.       Clinical Pain Assessment (nonverbal scale for severity on nonverbal patients):   Clinical Pain Assessment  Severity: 0     Activity (Movement): Lying quietly, normal position    Duration: for how long has pt been experiencing pain (e.g., 2 days, 1 month, years)  Frequency: how often pain is an issue (e.g., several times per day, once every few days, constant)     FUNCTIONAL ASSESSMENT:     Palliative Performance Scale (PPS):  PPS: 30       PSYCHOSOCIAL/SPIRITUAL SCREENING:     Palliative IDT has assessed this patient for cultural preferences / practices and a referral made as appropriate to needs (Cultural Services, Patient Advocacy, Ethics, etc.)    Advance Care Planning:  Advance Care Planning 5/15/2018   Patient's Healthcare Decision Maker is: Legal Next of Gigi Gilbert   Primary Decision Maker Name [address] Directive None       Any spiritual / Scientology concerns:  [] Yes /  [] No    Caregiver Burnout:  [] Yes /  [x] No /  [] No Caregiver Present      Anticipatory grief assessment:   [] Normal  / [] Maladaptive       ESAS Anxiety:      ESAS Depression:     Unable to evaluate above because of patient condition . REVIEW OF SYSTEMS:     Positive and pertinent negative findings in ROS are noted above in HPI. The following systems were [] reviewed / [x] unable to be reviewed as noted in HPI  Other findings are noted below. Systems: constitutional, ears/nose/mouth/throat, respiratory, gastrointestinal, genitourinary, musculoskeletal, integumentary, neurologic, psychiatric, endocrine. Positive findings noted below. Modified ESAS Completed by: provider           Pain: 0     Nausea: 0     Dyspnea: 0           Stool Occurrence(s): 1        PHYSICAL EXAM:     From RN flowsheet:  Wt Readings from Last 3 Encounters:   05/17/18 164 lb 14.5 oz (74.8 kg)   05/09/18 178 lb 9.2 oz (81 kg)   11/25/14 198 lb (89.8 kg)     Blood pressure 106/64, pulse 77, temperature 98.1 °F (36.7 °C), resp. rate (!) 41, height 6' (1.829 m), weight 164 lb 14.5 oz (74.8 kg), SpO2 94 %. Pain Scale 1: Adult Nonverbal Pain Scale  Pain Intensity 1: 0                 Last bowel movement, if known:     Constitutional: obtunded, not in any distress. Eyes: pupils equal, anicteric  ENMT: dry lips. Cardiovascular: regular rhythm,  Respiratory: breathing not labored, symmetric  Skin: dry  Neurologic:obtunded.      HISTORY:     Principal Problem:    Sepsis ([address] (5/9/2018)    Active Problems:    Shock ([address] (5/9/2018)      Past Medical History:   Diagnosis Date    Hypertension     Infectious disease     Hepatitis C Past Surgical History:   Procedure Laterality Date    HX ORTHOPAEDIC      back injury 2013      History reviewed. No pertinent family history. History reviewed, no pertinent family history. Social History   Substance Use Topics    Smoking status: Current Every Day Smoker    Smokeless tobacco: Never Used    Alcohol use Yes      Comment: occasional     No Known Allergies   Current Facility-Administered Medications   Medication Dose Route Frequency    fentaNYL (PF)  mcg/30 ml (ADULT)   IntraVENous TITRATE    albuterol-ipratropium (DUO-NEB) 2.5 MG-0.5 MG/3 ML  3 mL Nebulization Q6H PRN    LORazepam (ATIVAN) injection 2 mg  2 mg IntraVENous Q1H PRN    haloperidol lactate (HALDOL) injection 2 mg  2 mg IntraVENous Q4H PRN    bumetanide (BUMEX) injection 1 mg  1 mg IntraVENous BID    chlorhexidine (PERIDEX) 0.12 % mouthwash 15 mL  15 mL Oral BID    acetaminophen (TYLENOL) suppository 650 mg  650 mg Rectal Q6H PRN    sodium chloride (NS) flush 20 mL  20 mL InterCATHeter PRN    sodium chloride (NS) flush 10 mL  10 mL InterCATHeter PRN          LAB AND IMAGING FINDINGS:     Lab Results   Component Value Date/Time    WBC 27.5 (H) 05/15/2018 03:18 AM    HGB 11.6 (L) 05/15/2018 03:18 AM    PLATELET 54 (L) 83/78/5185 03:18 AM     Lab Results   Component Value Date/Time    Sodium 155 (H) 05/15/2018 03:18 AM    Potassium 4.2 05/15/2018 03:18 AM    Chloride 120 (H) 05/15/2018 03:18 AM    CO2 26 05/15/2018 03:18 AM    BUN 60 (H) 05/15/2018 03:18 AM    Creatinine 1.63 (H) 05/15/2018 03:18 AM    Calcium 7.7 (L) 05/15/2018 03:18 AM    Magnesium 2.1 05/15/2018 03:18 AM    Phosphorus 3.7 05/15/2018 03:18 AM      Lab Results   Component Value Date/Time    AST (SGOT) 149 (H) 05/15/2018 03:18 AM    Alk.  phosphatase 61 05/15/2018 03:18 AM    Protein, total 6.3 (L) 05/15/2018 03:18 AM    Albumin 1.4 (L) 05/15/2018 03:18 AM    Globulin 4.9 (H) 05/15/2018 03:18 AM     Lab Results   Component Value Date/Time    INR 1.3 (H) 05/15/2018 03:18 AM    Prothrombin time 13.5 (H) 05/15/2018 03:18 AM    aPTT 30.7 05/10/2018 09:37 PM      No results found for: IRON, FE, TIBC, IBCT, PSAT, FERR   No results found for: PH, PCO2, PO2  No components found for: [place]   Lab Results   Component Value Date/Time     05/10/2018 06:05 AM    CK - MB 31.6 (H) 05/10/2018 06:05 AM                Total time:   Counseling / coordination time, spent as noted above:   > 50% counseling / coordination?:     Prolonged service was provided for  []30 min   []75 min in face to face time in the presence of the patient, spent as noted above. Time Start:   Time End:   Note: this can only be billed with 43542 (initial) or 32833 (follow up). If multiple start / stop times, list each separately.

## 2018-05-17 NOTE — INTERDISCIPLINARY ROUNDS
IDR/SLIDR Summary          Patient: Matteo Sung MRN: [id number]    Age: 62 y.o. YOB: 1960 Room/Bed: [address] [place] [address]   Admit Diagnosis: Sepsis ([facility]  Shock ([facility]  Principal Diagnosis: [facility]   Goals: Afebrile,stable BP & HR  Readmission: NO  Quality Measure: SEPSIS  VTE Prophylaxis: Mechanical  Influenza Vaccine screening completed? NO  Pneumococcal Vaccine screening completed? NO  Mobility needs: No   Nutrition plan:Yes  Consults:P.T, O.T. and Respiratory    Financial concerns:Yes  Escalated to CM? YES  RRAT Score: 7   Interventions:H2H  Testing due for pt today?  YES  LOS: 8 days Expected length of stay TBD days  Discharge plan: TBD   PCP: None  Transportation needs: No    Days before discharge:two or more days before discharge   Discharge disposition: comfort care    Signed:     Peggy Owusu  5/17/2018  1:39 AM

## 2018-05-17 NOTE — HOSPICE
Spoke to hospice Liaison RN [facility] [place] who relayed patient would be appropriate for in-patient admission. Msw contacted brother Rambo Hahn to inform him - he was agreeable to meeting tomorrow. Brother was informed he would be contacted tomorrow with a time.   There is no POA and there are 8 siblings - will have to have consensus for admission to hospice

## 2018-05-17 NOTE — PROGRESS NOTES
[facility] Help to Those in Need  [phone]     Patient Name: Pankaj Luke  YOB: 1960  Age: 62 y.o. [facility] RN Note:  Hospice consult received, reviewing chart. Will follow up with Unit Nurse and Care Manager to discuss plan of care, patient status and discharge disposition within the hour. Thank you for the opportunity to be of service to this patient.     Rosalinda Ramires RN  [facility]

## 2018-05-17 NOTE — PROGRESS NOTES
Hospitalist Progress Note  Jerry Diaz MD  Answering service: [phone] OR [phone] from in house phone        Date of Service:  2018  NAME:  Raoul Rhodes  :  1960  MRN:  [id number]      Admission Summary:   62year old with PMH of COPD, HTN, and Hep C with significant forty plus year drug abuse history. The patient lives in assisted living at [facility] and [address] [place] [address] in an independent apartment per his brothers. He is   Normally alert and oriented but is not close to his family. He presented with altered mental status and Severe  Sepsis picture. He was admitted and we were consulted to manage these issues    Interval history / Subjective:   Mr. Alexandria Moser is unresponsive and cannot provide any history. Assessment & Plan:     Goals of care - extubated to comfort care . Fentanyl drip for work of breathing. Ativan PRN. Palliative care and hospice consulted.     Severe Sepsis with multisystem organ dysfunction in the setting of staph Endocarditis(POA)  Leukocytosis, Fever, Tachycardia, Hypotension, Elevated Lactate  Vanc, Levaquin and Zosyn since , now Vanc and Zosyn as per ID  Paired blood cultures with MRSA, UA  Chest CT with multiple cavitary lesions?or septic emboli, more likely  Lactate trended down 2.1  CT of abdomen ok, skin examined again and noted weeping to B/L LE  Echo with EF 55-60%, NRWMA, MV vegetation, TV vegetation  CT Surgery and ID following  HIV test negative    Acute Renal Failure  Renal US ok and s/s of medical renal disease  Kidney function elevated but stable at 1.73  Marginal UO  Nephrology consulted    Acute Metabolic Encephalopathy  Head CT with Subtle area of hyperattenuation in the right posterior parietal  Lobe, concern for septic emboli  Brain MRI ordered but cannot get this due to temporary pacemaker  Neurology consulted    Thrombocytopenia  Likely reactive but concerned for DIC vs Liver Dysfunction  Slight improvement today    Paroxysmal Atrial Fibrillation  Converted to NSR on 5/13 on Amio drip   Coagulation contraindicated with platelet function  EKG reviewed with atrial fibrillation and inferior lead T wave abnormality, QT prolonged on 5/11 but appears sinus at present on temp pacer    Elevated Troponin, s/p cardiac arrest, Acute Diastolic Heart Failure  Likely has CAD vs demand ischemia- defer to Cards  temporary pacemaker in place  Repeat Echo with EF 55% with severe MV regurgitation  Dobutamine drip  IV Bumex    Substance Abuse  Heroine and Cocaine abuse  Family unsure of Tobacco or ETOH use    Hepatitis C  HIV negative  AST and T Bili elevated, May be playing a role in thrombocytopenia    Abnormal CT imaging  Cavitary lesions bilaterally, PPD -, AFB culture sent  Airborne precautions dc'd per ID  Pulm consulted and no indication for bronch at this time    Code status: DNR   DVT prophylaxis: not indicated  Care Plan discussed with: RN  Disposition: likely admission to inpatient hospice tomorrow     Hospital Problems  Date Reviewed: 5/9/2018          Codes Class Noted POA    [facility] ICD-10-CM: R57.9  ICD-9-CM: 785.50  5/9/2018 Unknown        * (Principal)Sepsis ([facility] ICD-10-CM: A41.9  ICD-9-CM: 038.9, 995.91  5/9/2018 Unknown                Review of Systems:   Review of systems not obtained due to patient factors. Vital Signs:    Last 24hrs VS reviewed since prior progress note.  Most recent are:  Visit Vitals    /70 (BP 1 Location: Right arm, BP Patient Position: At rest)    Pulse 80    Temp 98.1 °F (36.7 °C)    Resp (!) 40    Ht 6' (1.829 m)    Wt 74.8 kg (164 lb 14.5 oz)    SpO2 90%    BMI 22.37 kg/m2         Intake/Output Summary (Last 24 hours) at 05/17/18 1544  Last data filed at 05/17/18 1542   Gross per 24 hour   Intake          3872.38 ml   Output             3485 ml   Net           387.38 ml        Physical Examination:         Constitutional:  largely unresponsive   ENT:  MM dry   Resp:  tachypnic, regular work of breathing, scattered rhonchi   CV:  rrr, 3/6 systolic murmur    GI:  Soft, non distended, non tender. + BS    Musculoskeletal:  + Anasarca with gross edema to all extremities    Neurologic:  unresponsive     Skin:  Weeping wounds to B/L LE where skin is cracking due to anasarca. Hematologic/Lymphatic/Immunlogic:  No jaundice nor lymph node swelling           Data Review:     Telemetry x   ECG    Xray    CT scan    MRI    Echocardiogram    Ultrasound              I have reviewed the flow sheet and recent notes  New labs and data personally reviewed. Labs:     Recent Labs      05/15/18   0318   WBC  27.5*   HGB  11.6*   HCT  36.3*   PLT  54*     Recent Labs      05/15/18   0318   NA  155*   K  4.2   CL  120*   CO2  26   BUN  60*   CREA  1.63*   GLU  184*   CA  7.7*   MG  2.1   PHOS  3.7     Recent Labs      05/15/18   0318   SGOT  149*   ALT  87*   AP  61   TBILI  3.4*   TP  6.3*   ALB  1.4*   GLOB  4.9*     Recent Labs      05/15/18   0318   INR  1.3*   PTP  13.5*      No results for input(s): FE, TIBC, PSAT, FERR in the last 72 hours. No results found for: FOL, RBCF   No results for input(s): PH, PCO2, PO2 in the last 72 hours. No results for input(s): CPK, CKNDX, TROIQ in the last 72 hours. No lab exists for component: CPKMB  Lab Results   Component Value Date/Time    Cholesterol, total <50 05/10/2018 04:32 AM    HDL Cholesterol 9 05/10/2018 04:32 AM    LDL, calculated  05/10/2018 04:32 AM     Unable to calculate LDL or VLDL due to low cholesterol.     Triglyceride 173 (H) 05/10/2018 04:32 AM    CHOL/HDL Ratio Cannot be calculated 05/10/2018 04:32 AM     Lab Results   Component Value Date/Time    Glucose (POC) 166 (H) 05/14/2018 08:41 PM    Glucose (POC) 112 (H) 05/11/2018 05:33 AM    Glucose (POC) 157 (H) 05/09/2018 12:51 PM    Glucose (POC) 96 05/09/2018 05:45 AM    Glucose (POC) 110 (H) 05/09/2018 05:03 AM     Lab Results   Component Value Date/Time    Color DARK YELLOW 05/09/2018 03:20 AM    Appearance CLOUDY (A) 05/09/2018 03:20 AM    Specific gravity 1.016 05/09/2018 03:20 AM    pH (UA) 5.5 05/09/2018 03:20 AM    Protein 30 (A) 05/09/2018 03:20 AM    Glucose NEGATIVE  05/09/2018 03:20 AM    Ketone NEGATIVE  05/09/2018 03:20 AM    Bilirubin SMALL (A) 11/25/2014 01:52 PM    Urobilinogen 1.0 05/09/2018 03:20 AM    Nitrites NEGATIVE  05/09/2018 03:20 AM    Leukocyte Esterase SMALL (A) 05/09/2018 03:20 AM    Epithelial cells FEW 05/09/2018 03:20 AM    Bacteria NEGATIVE  05/09/2018 03:20 AM    WBC 0-4 05/09/2018 03:20 AM    RBC 5-10 05/09/2018 03:20 AM         Medications Reviewed:     Current Facility-Administered Medications   Medication Dose Route Frequency    fentaNYL (PF)  mcg/30 ml (ADULT)   IntraVENous TITRATE    glycopyrrolate (ROBINUL) injection 0.2 mg  0.2 mg IntraVENous TID    albuterol-ipratropium (DUO-NEB) 2.5 MG-0.5 MG/3 ML  3 mL Nebulization Q6H PRN    LORazepam (ATIVAN) injection 2 mg  2 mg IntraVENous Q1H PRN    haloperidol lactate (HALDOL) injection 2 mg  2 mg IntraVENous Q4H PRN    bumetanide (BUMEX) injection 1 mg  1 mg IntraVENous BID    chlorhexidine (PERIDEX) 0.12 % mouthwash 15 mL  15 mL Oral BID    acetaminophen (TYLENOL) suppository 650 mg  650 mg Rectal Q6H PRN    sodium chloride (NS) flush 20 mL  20 mL InterCATHeter PRN    sodium chloride (NS) flush 10 mL  10 mL InterCATHeter PRN     ______________________________________________________________________  EXPECTED LENGTH OF STAY: 12d 12h  ACTUAL LENGTH OF STAY:          8                 Alfie Chatterjee MD

## 2018-05-17 NOTE — PROGRESS NOTES
1930 Bedside shift change report given to [address] (oncoming nurse) by Hans Swain RN (offgoing nurse). Report included the following information SBAR, Kardex, Intake/Output, MAR and Cardiac Rhythm NSR.     2000 pt resting comfortably during assessment, family/visitors present. 0730 Bedside shift change report given to 3114 Elise Chua (oncoming nurse) by Estrada Arreguin RN (offgoing nurse). Report included the following information SBAR, Intake/Output, MAR, Recent Results and Cardiac Rhythm NSR.

## 2018-05-17 NOTE — PROGRESS NOTES
Patient extubated yesterday-- comfort care. Wife, Shashank Fields, is incarcerated and has deferred medical decisions to patient's siblings (6 of them -names and numbers listed in palliative note 5/16/18). Patient's brother Olive Garner is the main tarugla-[phone]. Cm available to offer support to family and assist with an needs. UPDATE 10 am  Order received for hospice-- referral made to [facility]  [phone] via [facility]. . Robe Medrano is in agreement with patient being admitted to inpatient hospice.

## 2018-05-17 NOTE — ROUTINE PROCESS
TRANSFER - IN REPORT:    Verbal report received from [facility]) on Stef Leija  being received from CCU (unit) for routine progression of care      Report consisted of patients Situation, Background, Assessment and   Recommendations(SBAR). Information from the following report(s) SBAR, Intake/Output, MAR and Recent Results was reviewed with the receiving nurse. Opportunity for questions and clarification was provided. Assessment completed upon patients arrival to unit and care assumed.

## 2018-05-17 NOTE — PROGRESS NOTES
Problem: Pressure Injury - Risk of  Goal: *Prevention of pressure injury  Document Darwin Scale and appropriate interventions in the flowsheet. Offload heels  Turn approximately every 2 hours   Outcome: Progressing Towards Goal  Pressure Injury Interventions:  Sensory Interventions: Assess changes in LOC, Assess need for specialty bed, Minimize linen layers, Turn and reposition approx. every two hours (pillows and wedges if needed)    Moisture Interventions: Absorbent underpads, Internal/External urinary devices, Maintain skin hydration (lotion/cream)    Activity Interventions: Pressure redistribution bed/mattress(bed type)    Mobility Interventions: Pressure redistribution bed/mattress (bed type), HOB 30 degrees or less, PT/OT evaluation    Nutrition Interventions: Document food/fluid/supplement intake    Friction and Shear Interventions: Lift sheet, Lift team/patient mobility team, Foam dressings/transparent film/skin sealants               Problem: Falls - Risk of  Goal: *Absence of Falls  Document Jer Fall Risk and appropriate interventions in the flowsheet. Outcome: Progressing Towards Goal  Fall Risk Interventions:  Mobility Interventions: Communicate number of staff needed for ambulation/transfer    Mentation Interventions: More frequent rounding    Medication Interventions: Evaluate medications/consider consulting pharmacy    Elimination Interventions:  Toileting schedule/hourly rounds

## 2018-05-17 NOTE — PROGRESS NOTES
Pt seen and examined, he meets criteria for GIP hospice here at [facility], I would not tranfer as he is not stable for movement. He is in acute respiratory failure, transition to comfort care and high risk for decline. Hospice team will try to locate MPOA for further instructions. Pts life expectancy is very short within hours to days.   Emiliano Gaitan, KATHRIN

## 2018-05-17 NOTE — PROGRESS NOTES
0730 Bedside and Verbal shift change report given to Landry Chin (oncoming nurse) by Estrada Arreguin (offgoing nurse). Report included the following information SBAR, Kardex, ED Summary, Procedure Summary, Intake/Output, MAR, Accordion and Recent Results. 0830 Central Line removed. 0942 Dr. Carole Fuller @ bedside, Transvenous pacer and sheath removed.

## 2018-05-18 NOTE — DISCHARGE SUMMARY
Death Summary     Patient:  Volodymyr Mcclure       MRN: [id number]       YOB: 1960       Age: 62 y.o. Date of admission:  2018    Date of death:  2018    Time of death:  04:37 AM    Cause of death:  Staphylococcus Aureus Endocarditis    Primary care provider: Dr. Brannon Mcclure provider:  Hernan Jaramillo MD    Final provider:  Nav Theodore MD - Office [place]: [phone]. If unavailable, call [phone] and ask the  to page the triage hospitalist.    Consultations  · IP CONSULT TO HOSPITALIST  · IP CONSULT TO NEUROSURGERY  · IP CONSULT TO NEPHROLOGY  · IP CONSULT TO INFECTIOUS DISEASES  · IP CONSULT TO NEUROLOGY  · IP CONSULT TO INFECTIOUS DISEASES  · IP CONSULT TO PALLIATIVE CARE - PROVIDER  · IP CONSULT TO PULMONOLOGY    Procedures  · * No surgery found *    Admission diagnosis  · Sepsis ([address]  · Shock ([address]      Final diagnoses and brief hospital course  Please also refer to the admission H&P for details on the presenting problem. 62year old with PMH of COPD, HTN, and Hep C with significant forty plus year drug abuse history. The patient lives in assisted living at [address] [place] [address] in an independent apartment per his brothers. He is normally alert and oriented but is not close to his family. He presented with altered mental status and Severe Sepsis picture caused by endocarditis. He was treated with antibiotic therapy but continued to decline, developing septic emboli to his brain and hemorrhage. His family decided for comfort care and he .     Severe Sepsis with multisystem organ dysfunction in the setting of staph Endocarditis(POA)  Leukocytosis, Fever, Tachycardia, Hypotension, Elevated Lactate  Vanc, Levaquin and Zosyn since , now Vanc and Zosyn as per ID  Paired blood cultures with MRSA, UA  Chest CT with multiple cavitary lesions?or septic emboli, more likely  Lactate trended down 2.1  CT of abdomen ok, skin examined again and noted weeping to B/L LE  Echo with EF 55-60%, NRWMA, MV vegetation, TV vegetation  CT Surgery and ID following  HIV test negative     Acute Renal Failure  Renal US ok and s/s of medical renal disease  Kidney function elevated but stable at 1.73  Marginal UO  Nephrology consulted     Acute Metabolic Encephalopathy  Head CT with Subtle area of hyperattenuation in the right posterior parietal  Lobe, concern for septic emboli  Brain MRI ordered but cannot get this due to temporary pacemaker  Neurology consulted     Thrombocytopenia  Likely reactive but concerned for DIC vs Liver Dysfunction     Paroxysmal Atrial Fibrillation  Converted to NSR on 5/13 on Amio drip   Coagulation contraindicated with platelet function  EKG reviewed with atrial fibrillation and inferior lead T wave abnormality, QT prolonged on 5/11 but appears sinus at present on temp pacer     Elevated Troponin, s/p cardiac arrest, Acute Diastolic Heart Failure  Likely has CAD vs demand ischemia- defer to Cards  temporary pacemaker in place  Repeat Echo with EF 55% with severe MV regurgitation  Dobutamine drip  IV Bumex     Substance Abuse  Heroine and Cocaine abuse  Family unsure of Tobacco or ETOH use     Hepatitis C  HIV negative  AST and T Bili elevated, May be playing a role in thrombocytopenia     Abnormal CT imaging  Cavitary lesions bilaterally, PPD -, AFB culture sent  Airborne precautions dc'd per ID  Pulm consulted and no indication for bronch    Pertinent imaging studies:    CXR 5/9/18  FINDINGS:   AP semiupright view of the chest is obtained . The cardiopericardial silhouette is stable. There is no pleural effusion,  pneumothorax or there are numerous scattered cavitary foci identified throughout  the chest.     IMPRESSION  IMPRESSION:  Numerous scattered cavitary foci. Infectious/septic emboli, neoplastic etiology  should be considered.     CT head 5/9/18  FINDINGS:  The ventricles and sulci are normal in size, shape and configuration and  midline. There is no significant white matter disease. There is a subtle area of  hyperattenuation in the right posterior parietal lobe. The basilar cisterns are  open. No acute infarct is identified. The bone windows demonstrate no  abnormalities. There is mild mucosal thickening of the right maxillary sinus. .     IMPRESSION  IMPRESSION: Subtle area of hyperattenuation in the right posterior parietal  lobe. MRI would be useful for further evaluation. Alternatively, a follow-up CT  in a few hours could be performed to evaluate for interval change. A small  hemorrhage cannot be excluded and clinical correlation is recommended. CT chest 5/9/18  FINDINGS:     THYROID: No nodule. MEDIASTINUM: No mass or lymphadenopathy. GWENDOLYN: No mass or lymphadenopathy. THORACIC AORTA: No aneurysm. MAIN PULMONARY ARTERY: Normal in caliber. TRACHEA/BRONCHI: Patent. ESOPHAGUS: No wall thickening or dilatation. HEART: Normal in size. PLEURA: No effusion or pneumothorax. LUNGS: Multiple cavitary nodules are seen within the lungs bilaterally. INCIDENTALLY IMAGED UPPER ABDOMEN: No focal abnormality. BONES: No destructive bone lesion. There is subcutaneous emphysema in the right chest wall. A central line tip is  in the superior vena cava.      IMPRESSION  IMPRESSION:  Multiple bilateral cavitary lung nodules. Differential diagnostic possibilities  include infection, including septic emboli, and tumor. CT abdomen/pelvis 5/9/18  FINDINGS:   LIVER: No mass or biliary dilatation. GALLBLADDER: Unremarkable. SPLEEN: No mass. PANCREAS: No mass or ductal dilatation. ADRENALS: Unremarkable. KIDNEYS/URETERS: No mass, calculus, or hydronephrosis. STOMACH: Unremarkable. SMALL BOWEL: There are a few mildly dilated loops of bowel within the abdomen  and pelvis. COLON: No dilatation or wall thickening. APPENDIX: Unremarkable. PERITONEUM: No ascites or pneumoperitoneum.   RETROPERITONEUM: No lymphadenopathy or aortic aneurysm. REPRODUCTIVE ORGANS: Normal  URINARY BLADDER: No mass or calculus. Souza catheter. BONES: No destructive bone lesion. ADDITIONAL COMMENTS: N/A     IMPRESSION  IMPRESSION:  No acute findings in the abdomen or pelvis. US renal 5/9/18  The patient was studied with real-time ultrasound. Coronal and transverse images  of both kidneys were obtained. Renal parenchyma is normal in thickness and  increased in echotexture bilaterally. . There is no hydronephrosis. The longest  dimension of the right kidney is 13.4 cm, while the left kidney is 13.1 cm. The  proximal to mid abdominal aorta tapers normally. The remainder of the abdominal  aorta and proximal origins of the iliac arteries are obscured by bowel gas. The  IVC is patent. The urinary bladder was empty containing a Souza catheter during  examination.      Incidentally imaged is the spleen which is mildly enlarged at 14.6 cm in length. Also noted is a hyperechoic lesion in the anterior right lobe of the liver which  is well-defined measuring 1.3 x 1.1 cm.     IMPRESSION  IMPRESSION:         1. Renal parenchyma is echogenic as may be seen with medical renal disease. No  hydronephrosis. 2. Incidental findings of borderline splenomegaly and echogenic liver lesion  which cannot be characterized further by this exam.     Echocardiogram 5/9/18  SUMMARY:  Left ventricle: Systolic function was normal. Ejection fraction was  estimated in the range of 55 % to 60 %. There were no regional wall motion  abnormalities. Left atrium: The atrium was moderately dilated. Mitral valve: There was severe flail motion of the posterior leaflet. There was severe regurgitation. There was a probable, medium-sized,  papillary, highly mobile vegetation on the tip of the posterior leaflet,  on the atrial aspect. Tricuspid valve: There was a probable, large, highly mobile vegetation on  the body of the septal leaflet, on the right ventricular aspect.     INDICATIONS: Cardiac vegetations, septic emboli    PROCEDURE: This was a routine study. The study included complete 2D  imaging, M-mode, complete spectral Doppler, and color Doppler. The heart  rate was 91 bpm, at the start of the study. Systolic blood pressure was  106 mmHg, at the start of the study. Diastolic blood pressure was 66 mmHg,  at the start of the study. Image quality was adequate. LEFT VENTRICLE: Size was normal. Systolic function was normal. Ejection  fraction was estimated in the range of 55 % to 60 %. There were no  regional wall motion abnormalities. Wall thickness was normal.    RIGHT VENTRICLE: The size was normal. Systolic function was normal. Wall  thickness was normal.    LEFT ATRIUM: The atrium was moderately dilated. RIGHT ATRIUM: Size was normal.    MITRAL VALVE: There was mild diffuse thickening. There was severe flail  motion of the posterior leaflet. There was a probable, medium-sized,  papillary, highly mobile vegetation on the tip of the posterior leaflet,  on the atrial aspect. DOPPLER: There was severe regurgitation. AORTIC VALVE: The valve was trileaflet. Leaflets exhibited normal  thickness and normal cuspal separation. DOPPLER: Transaortic velocity was  within the normal range. There was no stenosis. There was no regurgitation. TRICUSPID VALVE: There was normal leaflet separation. There was a  probable, large, highly mobile vegetation on the body of the septal  leaflet, on the right ventricular aspect. PULMONIC VALVE: Not well visualized, but normal Doppler findings. AORTA: The root exhibited normal size. PERICARDIUM: There was no pericardial effusion. The pericardium was normal  in appearance. Echocardiogram 5/10/18  SUMMARY:  Left ventricle: Systolic function was normal. Ejection fraction was  estimated in the range of 50 % to 55 %. There were no regional wall motion  abnormalities. Left atrium: The atrium was dilated. Mitral valve:  There was severe regurgitation. There was a definite,  medium-sized, pedunculated, layered vegetation on the atrial aspect. Tricuspid valve: There was severe regurgitation. There was a definite,  large vegetation on the right ventricular aspect. INDICATIONS: Chest pain. PROCEDURE: This was a routine study. The study included complete 2D  imaging, complete spectral Doppler, and color Doppler. The heart rate was  68 bpm, at the start of the study. Systolic blood pressure was 97 mmHg, at  the start of the study. Diastolic blood pressure was 78 mmHg, at the start  of the study. LEFT VENTRICLE: Size was normal. Systolic function was normal. Ejection  fraction was estimated in the range of 50 % to 55 %. There were no  regional wall motion abnormalities. Wall thickness was normal. DOPPLER:  Indeterminate diastolic function. RIGHT VENTRICLE: The size was normal. Systolic function was normal. Wall  thickness was normal.    LEFT ATRIUM: The atrium was dilated. RIGHT ATRIUM: Size was normal.    MITRAL VALVE: Normal valve structure. There was a definite, medium-sized,  pedunculated, layered vegetation on the atrial aspect. DOPPLER: There was  severe regurgitation. AORTIC VALVE: The valve was trileaflet. Leaflets exhibited normal  thickness and normal cuspal separation. DOPPLER: Transaortic velocity was  within the normal range. There was no stenosis. There was no regurgitation. TRICUSPID VALVE: Normal valve structure. There was a definite, large  vegetation on the right ventricular aspect. DOPPLER: There was severe  regurgitation. PULMONIC VALVE: Leaflets exhibited normal thickness, no calcification, and  normal cuspal separation. DOPPLER: The transpulmonic velocity was within  the normal range. There was no regurgitation. AORTA: The root exhibited normal size. PERICARDIUM: There was no pericardial effusion. The pericardium was normal  in appearance.     US abdomen 5/10/18  Real-time sonographic imaging of the right upper quadrant was performed. 1.1 cm  echogenic nodule in the right hepatic lobe is unchanged. No gallstones are  noted. The gallbladder wall is thickened measuring 4 mm, and there is slight  pericholecystic fluid. Common bile duct is normal in size. The right kidney is  congenitally compatible with medical renal disease. There is no evidence of mass  lesion, hydronephrosis, or calcification. The visualized portion of the head and  body of the pancreas is unremarkable. No free fluid. There is a small right  pleural effusion.     IMPRESSION  Impression: Nonspecific gallbladder wall thickening and slight pericholecystic  fluid. No gallstones are identified. Echogenic right kidney compatible with  medical renal disease. Stable small echogenic lesion in the right hepatic lobe  may represent a small hemangioma. CT head 5/14/18  FINDINGS:     There is subarachnoid hemorrhage collecting in the left convexity and a  persistent right posterior parietal subtle subarachnoid hyperdensity. The  ventricles and sulci are normal in size, shape and configuration and midline. There is no significant white matter disease. .  The basilar cisterns are open. No acute infarct is identified. The bone windows demonstrate no abnormalities. The visualized portions of the paranasal sinuses and mastoid air cells are  clear.     IMPRESSION  IMPRESSION: New left subarachnoid hemorrhage with persistent area of hypodensity  in the right occipital lobe compatible with additional small subarachnoid  Hemorrhage. No results for input(s): WBC, HGB, HCT, PLT, HGBEXT, HCTEXT, PLTEXT in the last 72 hours. No results for input(s): NA, K, CL, CO2, BUN, CREA, GLU, CA, MG, PHOS, URICA in the last 72 hours. No results for input(s): SGOT, GPT, AP, TBIL, TP, ALB, GLOB, GGT, AML, LPSE in the last 72 hours. No lab exists for component: AMYP, HLPSE  No results for input(s): INR, PTP, APTT in the last 72 hours.     No lab exists for component: INREXT   No results for input(s): FE, TIBC, PSAT, FERR in the last 72 hours. No results for input(s): PH, PCO2, PO2 in the last 72 hours. No results for input(s): CPK, CKMB in the last 72 hours. No lab exists for component: TROPONINI  No components found for: [place]    Chronic Diagnoses:    Problem List as of 5/18/2018  Date Reviewed: 5/9/2018          Codes Class Noted - Resolved    Shock ([facility] ICD-10-CM: R57.9  ICD-9-CM: 785.50  5/9/2018 - Present        * (Principal)Sepsis ([facility] ICD-10-CM: A41.9  ICD-9-CM: 038.9, 995.91  5/9/2018 - Present              Time spent on care and related activities today greater than 30 minutes.       Signed:  Jodie Baptiste MD                 Hospitalist, Internal Medicine      Cc: None

## 2018-05-18 NOTE — HOSPICE
Attempted to contact brother Leo Peers via phone today to schedule a time to sign consents today for in-patient GIP LOC. Message left.

## 2018-05-18 NOTE — PROGRESS NOTES
Responded to notification of pt death in 12; no family was present; provided prayer of commendation. .  Antonietta Grimaldo, Ph.D., M.Div., M.A.,   /Director of [id number] [place] [address]  Paging Service: [phone])

## 2018-05-18 NOTE — PROGRESS NOTES
0130: checked on patient for rounds and he was not breathing. No heart beat heard and no pulse felt. RN came in to check as well. Paged on call hospitalist to let MD know of patient passing. 0133: Dr. Aranza Waller made aware of patient passing. 0137: [facility] was called and spoke to Paige Burdick, [facility] released him. 0140: called brother Jose Ghotra with the number listed on the chart, there was no answer. 0145: called brother Jose Ghotra again with the number listed on the chart and there was no answer. 0200: patients painting and IV taken out, no other lines in patient. 0220: tried calling patients brother again and no answer. 0400: tried to call patients brother again and no answer. 0500: tried to call patients brother again and no answer. 3330Devorhea Sanderson, patients brother called back and Homero Robertson RN spoke to him and informed him of his brothers passing. Brother stated he would call the family and decide what  home and if they were going to come see him and call back. 0700: patients family has not come up to see patient yet, patients brother Jacqueline Piper has not called back yet to establish any plans for the patient. Patients niece called asking about the patient but she was told to call Jacqueline Piper to get any information about the patient.

## 2018-05-18 NOTE — PROGRESS NOTES
Adonay Sanchez, KATHRIN Nurse Practitioner Addendum Nurse Practitioner Progress Notes Date of Service: 18 1736         []Hide copied text  []Cuong for attribution information     [facility] [address] [place] [address]     May 18, 2018         RE: Luis Alberto Herman        To Whom It May Concern,     This is to certify that Luis Alberto Herman has been a patient in the hospital for 9 days and  on 18.     Please feel free to contact my office if you have any questions or concerns.   Thank you for your assistance in this matter.        Sincerely,  Brant Santana RN  [facility]  [phone]

## 2018-05-18 NOTE — PROGRESS NOTES
I was called to examine patient who  at 01:30 hours. On exam, patient had:  No response to verbal and tactile stimuli. No respiratory effort. Absent heart sounds and pulses. Pupils fixed and dilated. Patient was pronounced dead at 04:37 hours.      Tram Pedraza MD   Hospitalist

## 2018-05-20 LAB
BACTERIA SPEC CULT: ABNORMAL
SERVICE CMNT-IMP: ABNORMAL

## 2018-05-21 NOTE — HOSPICE
Placed another call to brother Magdalena Kowalski to schedule a time to meet today - message left on answering machine.

## 2018-06-23 LAB
ACID FAST STN SPEC: NEGATIVE
MYCOBACTERIUM SPEC QL CULT: NEGATIVE
SPECIMEN PREPARATION: NORMAL
SPECIMEN SOURCE: NORMAL
